# Patient Record
Sex: MALE | Race: WHITE | NOT HISPANIC OR LATINO | Employment: OTHER | URBAN - METROPOLITAN AREA
[De-identification: names, ages, dates, MRNs, and addresses within clinical notes are randomized per-mention and may not be internally consistent; named-entity substitution may affect disease eponyms.]

---

## 2020-01-17 ENCOUNTER — TELEPHONE (OUTPATIENT)
Dept: NEUROLOGY | Facility: CLINIC | Age: 70
End: 2020-01-17

## 2020-01-17 NOTE — TELEPHONE ENCOUNTER
----- Message from Rosalia Nguyen sent at 1/17/2020  2:02 PM CST -----  Contact: Artemio Warren calling to speak to Dr. Fagan he says he is a part of a medical study and has some questions to ask the doctor.  He can be reached at [phone]

## 2020-01-22 NOTE — TELEPHONE ENCOUNTER
Mr. Ulloa was calling in regards to his wife, left message on his voicemail requesting a call back. Will also document under her chart.     MD Veronica Martin MA   Caller: Unspecified (5 days ago,  4:54 PM)             Veronica,   I don't see that I have ever seen him.  Do you see anything?  If not, can you call him to find out?   Thanks,   RZ

## 2021-05-31 PROBLEM — M48.061 SPINAL STENOSIS, LUMBAR REGION WITHOUT NEUROGENIC CLAUDICATION: Status: ACTIVE | Noted: 2021-05-31

## 2021-08-13 PROBLEM — Z86.19 HISTORY OF HEPATITIS: Status: ACTIVE | Noted: 2021-08-13

## 2021-08-13 PROBLEM — E53.8 VITAMIN B12 DEFICIENCY: Status: ACTIVE | Noted: 2021-08-13

## 2021-08-13 PROBLEM — E55.9 VITAMIN D DEFICIENCY: Status: ACTIVE | Noted: 2021-08-13

## 2022-05-30 NOTE — PROGRESS NOTES
Subjective:      Patient ID: Artemio Ogden is a 72 y.o. male.    Chief Complaint: No chief complaint on file.    HPI:  Artemio Ogden is a 72 y.o. male who presents as an initial consult for severe mitral regurgitation. He has a medical history significant for afib, mitral regurgitation, GERD, MARCOS, and tyroid disease.   He reports that he has known about his mitral valve for many year but recently found out that he has advanced from moderate to severe regurgitation. He reports a history of atrial fibrillation.  His most recent echo reveals an ejection fraction is 35%.  He reports orthopnea, dyspnea on exertion, fatigue, and palpations.     Family and social history reviewed    Review of patient's allergies indicates:  No Known Allergies  Past Medical History:   Diagnosis Date    A-fib     Digestive disorder     reflux    Sleep apnea     No CPAP    Thyroid disease      Past Surgical History:   Procedure Laterality Date    CARDIAC SURGERY      AICD    EYE SURGERY Left 04/05/2012    Cataract     EYE SURGERY Left 02/14/2013    YAG Laser    EYE SURGERY Bilateral 01/14/2011    Lasik     HERNIA REPAIR  01/01/1967    PROSTATE SURGERY  01/01/1999    THYROID LOBECTOMY  09/11/2006     Family History     Family history is unknown by patient.        Social History     Socioeconomic History    Marital status:    Tobacco Use    Smoking status: Former Smoker    Smokeless tobacco: Never Used   Substance and Sexual Activity    Alcohol use: No    Drug use: No    Sexual activity: Yes     Partners: Female       Current Outpatient Medications:     apixaban (ELIQUIS) 5 mg Tab, Take 5 mg by mouth 2 (two) times daily., Disp: , Rfl:     ARGININE HCL, L-ARGININE, ORAL, Take 750 mg by mouth once daily., Disp: , Rfl:     ascorbic acid, vitamin C, (VITAMIN C) 1000 MG tablet, Take 1,500 mg by mouth 2 (two) times daily., Disp: , Rfl:     aspirin 325 MG tablet, Take 325 mg by mouth once daily., Disp: , Rfl:      "atenoloL (TENORMIN) 50 MG tablet, Take 50 mg by mouth once daily., Disp: , Rfl:     azelastine (ASTELIN) 137 mcg (0.1 %) nasal spray, 1 spray by Nasal route 2 (two) times daily., Disp: , Rfl:     BD LUER-KHADRA SYRINGE 3 mL 21 x 1 1/2" Syrg, , Disp: , Rfl: 2    cetirizine (ZYRTEC) 10 MG tablet, Take 10 mg by mouth once daily., Disp: , Rfl:     cholecalciferol, vitamin D3, (VITAMIN D3) 50 mcg (2,000 unit) Tab, Take 5,000 Units by mouth once daily., Disp: , Rfl:     co-enzyme Q-10 30 mg capsule, Take 400 mg by mouth 2 (two) times daily., Disp: , Rfl:     diphenhydrAMINE (BENADRYL) 25 mg capsule, Take 5 mg by mouth every 6 (six) hours as needed for Itching., Disp: , Rfl:     fexofenadine (ALLEGRA) 30 MG tablet, Take 30 mg by mouth 2 (two) times daily., Disp: , Rfl:     furosemide (LASIX) 20 MG tablet, Take 20 mg by mouth 3 (three) times daily., Disp: , Rfl:     levothyroxine (SYNTHROID) 100 MCG tablet, Take 100 mcg by mouth before breakfast., Disp: , Rfl:     liothyronine (CYTOMEL) 5 MCG Tab, Take 5 mcg by mouth once daily., Disp: , Rfl: 2    magnesium glycinate-mag oxide 120 mg magnesium Cap, Take 120 mg by mouth once daily at 6am., Disp: , Rfl:     mometasone (NASONEX) 50 mcg/actuation nasal spray, 2 sprays by Nasal route once daily., Disp: , Rfl:     montelukast (SINGULAIR) 10 mg tablet, Take 10 mg by mouth once daily., Disp: , Rfl: 3    potassium chloride (KLOR-CON) 10 MEQ TbSR, Take 10 mEq by mouth 2 (two) times a day., Disp: , Rfl:     QUERCETIN DIHYDRATE, BULK, MISC, 500 mg by Misc.(Non-Drug; Combo Route) route., Disp: , Rfl:     tadalafil (CIALIS) 20 MG Tab, Take 20 mg by mouth once daily., Disp: , Rfl:     testosterone cypionate (DEPOTESTOTERONE CYPIONATE) 200 mg/mL injection, Inject into the muscle every 7 days., Disp: , Rfl: 3    vitamin A 91534 UNIT capsule, Take 5,000 Units by mouth once daily., Disp: , Rfl:     zinc sulfate (ZINCATE) 50 mg zinc (220 mg) capsule, Take 220 mg by mouth once " daily., Disp: , Rfl:   Current medications Reviewed    Review of Systems   Constitutional: Positive for fatigue. Negative for activity change, appetite change and fever.   HENT: Negative for nosebleeds.    Respiratory: Positive for shortness of breath. Negative for cough.    Cardiovascular: Positive for palpitations. Negative for chest pain and leg swelling.        Orthopnea   Gastrointestinal: Negative for abdominal distention, abdominal pain and nausea.   Genitourinary: Negative for frequency.   Musculoskeletal: Negative for arthralgias and myalgias.   Skin: Negative for rash.   Neurological: Negative for dizziness and numbness.   Hematological: Does not bruise/bleed easily.     Objective:   Physical Exam  Constitutional:       Appearance: He is obese.   HENT:      Head: Normocephalic and atraumatic.   Eyes:      Extraocular Movements: Extraocular movements intact.   Cardiovascular:      Rate and Rhythm: Normal rate and regular rhythm.   Pulmonary:      Effort: Pulmonary effort is normal.   Abdominal:      General: Abdomen is flat.      Palpations: Abdomen is soft.   Musculoskeletal:         General: Normal range of motion.      Cervical back: Normal range of motion.   Skin:     General: Skin is warm and dry.      Capillary Refill: Capillary refill takes less than 2 seconds.   Neurological:      General: No focal deficit present.       Diagnostic Results: Reviewed  RUCHI:  · Moderately decreased systolic function.  · The estimated ejection fraction is 35%.  · Left ventricular diastolic dysfunction.  · Normal right ventricular size with normal right ventricular systolic function.  · Severe left atrial enlargement.  · Mild right atrial enlargement.  · Mild aortic regurgitation.  · Severe mitral regurgitation.  · A 200 J synchronized cardioversion was successfully performed with restoration of normal sinus rhythm.  Assessment:   1. Severe Mitral Valve Regurgitation  Plan:     CTS Attending Note:    I have personally  taken the history and examined this patient and agree with the JJ's note as stated above.  Very pleasant 72-year-old gentleman with severe mitral regurgitation and atrial fibrillation.  He is symptomatic for this, with dyspnea on exertion.  His wife has had a stroke and he is her primary caregiver.  I recommended mitral valve repair and Maze procedure.  He has an angiogram scheduled for this Thursday.  We did not discuss risks and benefits on this visit, or valve choice should repair not be feasible.  We will discuss that at his preoperative visit.

## 2022-05-31 ENCOUNTER — OFFICE VISIT (OUTPATIENT)
Dept: CARDIOTHORACIC SURGERY | Facility: CLINIC | Age: 72
End: 2022-05-31
Payer: MEDICARE

## 2022-05-31 VITALS
RESPIRATION RATE: 18 BRPM | HEIGHT: 73 IN | DIASTOLIC BLOOD PRESSURE: 80 MMHG | OXYGEN SATURATION: 96 % | BODY MASS INDEX: 31.95 KG/M2 | WEIGHT: 241.06 LBS | HEART RATE: 74 BPM | SYSTOLIC BLOOD PRESSURE: 134 MMHG

## 2022-05-31 DIAGNOSIS — I48.0 PAROXYSMAL ATRIAL FIBRILLATION: ICD-10-CM

## 2022-05-31 DIAGNOSIS — I34.0 MITRAL VALVE INSUFFICIENCY, UNSPECIFIED ETIOLOGY: ICD-10-CM

## 2022-05-31 DIAGNOSIS — I34.0 MITRAL VALVE INSUFFICIENCY, UNSPECIFIED ETIOLOGY: Primary | ICD-10-CM

## 2022-05-31 PROCEDURE — 99999 PR PBB SHADOW E&M-EST. PATIENT-LVL V: CPT | Mod: PBBFAC,,, | Performed by: THORACIC SURGERY (CARDIOTHORACIC VASCULAR SURGERY)

## 2022-05-31 PROCEDURE — 99205 OFFICE O/P NEW HI 60 MIN: CPT | Mod: S$PBB,,, | Performed by: THORACIC SURGERY (CARDIOTHORACIC VASCULAR SURGERY)

## 2022-05-31 PROCEDURE — 99205 PR OFFICE/OUTPT VISIT, NEW, LEVL V, 60-74 MIN: ICD-10-PCS | Mod: S$PBB,,, | Performed by: THORACIC SURGERY (CARDIOTHORACIC VASCULAR SURGERY)

## 2022-05-31 PROCEDURE — 99215 OFFICE O/P EST HI 40 MIN: CPT | Mod: PBBFAC | Performed by: THORACIC SURGERY (CARDIOTHORACIC VASCULAR SURGERY)

## 2022-05-31 PROCEDURE — 99999 PR PBB SHADOW E&M-EST. PATIENT-LVL V: ICD-10-PCS | Mod: PBBFAC,,, | Performed by: THORACIC SURGERY (CARDIOTHORACIC VASCULAR SURGERY)

## 2022-05-31 RX ORDER — BUDESONIDE AND FORMOTEROL FUMARATE DIHYDRATE 160; 4.5 UG/1; UG/1
AEROSOL RESPIRATORY (INHALATION)
Status: ON HOLD | COMMUNITY
End: 2022-06-02 | Stop reason: ALTCHOICE

## 2022-05-31 RX ORDER — MAGNESIUM 200 MG
120 TABLET ORAL 2 TIMES DAILY
Status: ON HOLD | COMMUNITY
End: 2022-06-02 | Stop reason: SDUPTHER

## 2022-05-31 RX ORDER — METOPROLOL TARTRATE 100 MG/1
TABLET ORAL
Status: ON HOLD | COMMUNITY
End: 2022-06-02

## 2022-05-31 RX ORDER — METOPROLOL TARTRATE 50 MG/1
TABLET ORAL
Status: ON HOLD | COMMUNITY
End: 2022-06-02

## 2022-05-31 RX ORDER — NIRMATRELVIR AND RITONAVIR 300-100 MG
KIT ORAL
Status: ON HOLD | COMMUNITY
End: 2022-06-02 | Stop reason: ALTCHOICE

## 2022-05-31 RX ORDER — ALBUTEROL SULFATE 90 UG/1
AEROSOL, METERED RESPIRATORY (INHALATION)
COMMUNITY
End: 2022-10-21

## 2022-05-31 RX ORDER — HYDROCODONE BITARTRATE AND ACETAMINOPHEN 10; 325 MG/1; MG/1
1 TABLET ORAL EVERY 6 HOURS PRN
Status: ON HOLD | COMMUNITY
Start: 2022-01-20 | End: 2022-06-02 | Stop reason: ALTCHOICE

## 2022-05-31 RX ORDER — TAMSULOSIN HYDROCHLORIDE 0.4 MG/1
CAPSULE ORAL
Status: ON HOLD | COMMUNITY
End: 2022-06-02 | Stop reason: ALTCHOICE

## 2022-05-31 RX ORDER — ARGININE OXOGLURATE 350 MG
TABLET, EXTENDED RELEASE ORAL
Status: ON HOLD | COMMUNITY
End: 2022-06-30 | Stop reason: SDUPTHER

## 2022-05-31 RX ORDER — BENZONATATE 200 MG/1
200 CAPSULE ORAL 2 TIMES DAILY PRN
Status: ON HOLD | COMMUNITY
Start: 2022-05-21 | End: 2022-06-02 | Stop reason: ALTCHOICE

## 2022-05-31 RX ORDER — LISINOPRIL 5 MG/1
TABLET ORAL
Status: ON HOLD | COMMUNITY
End: 2022-06-02 | Stop reason: ALTCHOICE

## 2022-05-31 RX ORDER — PROMETHAZINE HYDROCHLORIDE AND DEXTROMETHORPHAN HYDROBROMIDE 6.25; 15 MG/5ML; MG/5ML
SYRUP ORAL
Status: ON HOLD | COMMUNITY
End: 2022-06-02 | Stop reason: ALTCHOICE

## 2022-05-31 RX ORDER — GUAIFENESIN 400 MG/1
TABLET ORAL
COMMUNITY
End: 2022-09-28

## 2022-05-31 RX ORDER — MINERAL OIL
ENEMA (ML) RECTAL
COMMUNITY
End: 2022-10-21

## 2022-05-31 RX ORDER — NIRMATRELVIR AND RITONAVIR 300-100 MG
3 KIT ORAL 2 TIMES DAILY
Status: ON HOLD | COMMUNITY
Start: 2022-05-21 | End: 2022-06-02 | Stop reason: ALTCHOICE

## 2022-05-31 RX ORDER — TESTOSTERONE 25 MG/2.5G
GEL TRANSDERMAL
Status: ON HOLD | COMMUNITY
End: 2022-06-02 | Stop reason: ALTCHOICE

## 2022-05-31 RX ORDER — CALCIUM CARB/VITAMIN D3/VIT K1 500-500-40
TABLET,CHEWABLE ORAL
Status: ON HOLD | COMMUNITY
End: 2022-06-30 | Stop reason: SDUPTHER

## 2022-05-31 RX ORDER — AZITHROMYCIN 250 MG/1
TABLET, FILM COATED ORAL
Status: ON HOLD | COMMUNITY
Start: 2022-05-11 | End: 2022-06-02 | Stop reason: ALTCHOICE

## 2022-05-31 RX ORDER — ZINC SULFATE 66 MG
TABLET ORAL
Status: ON HOLD | COMMUNITY
End: 2022-06-02 | Stop reason: ALTCHOICE

## 2022-05-31 RX ORDER — LOSARTAN POTASSIUM 25 MG/1
TABLET ORAL
Status: ON HOLD | COMMUNITY
End: 2022-06-02 | Stop reason: CLARIF

## 2022-05-31 NOTE — LETTER
[facility] 2nd Fl  [address]  [facility] [address]  Phone: [phone] May 31, 2022          Nikhil Lechuga III, MD  [address] [facility] [address]  [facility] [address]    Patient: Artemio Ogden   MR Number: [id number]   YOB: 1950   Date of Visit: 5/31/2022     Dear Dr. Lechuga:     I had the pleasure of seeing your patient, Mr. Artemio Ogden, in clinic today.  As you know, he is a very pleasant 72-year-old gentleman with a long history of atrial fibrillation and mitral regurgitation.  He is symptomatic for this.  We discussed surgical mitral valve repair with Maze procedure as well as mitral clip.  He agreed that surgically addressing the valve and treating the atrial fibrillation was the best course of action.  We will plan to get this done for him later this month.  I understand he has an angiogram scheduled with you this Thursday.  We will proceed with surgical revascularization as well if that is indicated by the results of the angiogram.      Thank you for sending this pleasant gentleman to me.  It was a pleasure to meet him.  When he does come to surgery, I will certainly keep you appraised of his progress.    Sincerely,          MD Gerald Perez

## 2022-06-03 ENCOUNTER — TELEPHONE (OUTPATIENT)
Dept: CARDIOTHORACIC SURGERY | Facility: CLINIC | Age: 72
End: 2022-06-03
Payer: MEDICARE

## 2022-06-03 NOTE — TELEPHONE ENCOUNTER
Called pt to review pre-op testing appointments which have been scheduled for Tuesday, June 28. Pt needs pre-op echo per Dr. Machado's request, but no availability on that day. Pt states he prefers to the echo completed with his cardiologist, Dr. Lechuga. Notified Dr. Lechuga's office who will complete echo and fax results to us.     Pt confirms [employer] Defibrillator still in place. Notified Arrhythmia Department at [facility].     Pt requests all pre-op information sent to him via e-mail. Confirmed e-mail address. Sent pre-op testing appointment schedule and list of medications to hold prior to surgery.

## 2022-06-13 ENCOUNTER — TELEPHONE (OUTPATIENT)
Dept: CARDIOTHORACIC SURGERY | Facility: CLINIC | Age: 72
End: 2022-06-13
Payer: MEDICARE

## 2022-06-13 NOTE — TELEPHONE ENCOUNTER
Received call from pt asking for assistance with My Chart activation. Re-sent activation code to pt's email. Pt asking to review medications to hold and appointments for pre-op day. Reviewed and pt verbalized understanding of all information.

## 2022-06-20 ENCOUNTER — TELEPHONE (OUTPATIENT)
Dept: CARDIOTHORACIC SURGERY | Facility: CLINIC | Age: 72
End: 2022-06-20
Payer: MEDICARE

## 2022-06-20 NOTE — TELEPHONE ENCOUNTER
Returned missed call to pt. Pt wanting to review medications again. Also asking if Dr. Machado is OK with him taking prophylactic ivermectin prior to hospital admission. Will discuss with Dr. Machado and return call to pt. Pt verbalized understanding.

## 2022-06-28 ENCOUNTER — HOSPITAL ENCOUNTER (OUTPATIENT)
Dept: PULMONOLOGY | Facility: CLINIC | Age: 72
Discharge: HOME | DRG: 220 | End: 2022-06-28
Payer: MEDICARE

## 2022-06-28 ENCOUNTER — HOSPITAL ENCOUNTER (OUTPATIENT)
Dept: VASCULAR SURGERY | Facility: CLINIC | Age: 72
Discharge: HOME | DRG: 220 | End: 2022-06-28
Attending: THORACIC SURGERY (CARDIOTHORACIC VASCULAR SURGERY)
Payer: MEDICARE

## 2022-06-28 ENCOUNTER — OFFICE VISIT (OUTPATIENT)
Dept: CARDIOTHORACIC SURGERY | Facility: CLINIC | Age: 72
DRG: 220 | End: 2022-06-28
Payer: MEDICARE

## 2022-06-28 ENCOUNTER — HOSPITAL ENCOUNTER (OUTPATIENT)
Dept: CARDIOLOGY | Facility: CLINIC | Age: 72
Discharge: HOME | DRG: 220 | End: 2022-06-28
Payer: MEDICARE

## 2022-06-28 ENCOUNTER — HOSPITAL ENCOUNTER (OUTPATIENT)
Dept: RADIOLOGY | Facility: HOSPITAL | Age: 72
Discharge: HOME | DRG: 220 | End: 2022-06-28
Attending: THORACIC SURGERY (CARDIOTHORACIC VASCULAR SURGERY)
Payer: MEDICARE

## 2022-06-28 VITALS
DIASTOLIC BLOOD PRESSURE: 62 MMHG | HEIGHT: 73 IN | SYSTOLIC BLOOD PRESSURE: 139 MMHG | BODY MASS INDEX: 31.7 KG/M2 | WEIGHT: 239.19 LBS | OXYGEN SATURATION: 96 % | HEART RATE: 62 BPM | RESPIRATION RATE: 18 BRPM

## 2022-06-28 DIAGNOSIS — I34.0 MITRAL VALVE INSUFFICIENCY, UNSPECIFIED ETIOLOGY: Primary | ICD-10-CM

## 2022-06-28 DIAGNOSIS — I48.0 PAROXYSMAL ATRIAL FIBRILLATION: ICD-10-CM

## 2022-06-28 DIAGNOSIS — I34.0 MITRAL VALVE INSUFFICIENCY, UNSPECIFIED ETIOLOGY: ICD-10-CM

## 2022-06-28 DIAGNOSIS — Z13.9 SCREENING PROCEDURE: Primary | ICD-10-CM

## 2022-06-28 DIAGNOSIS — Z01.818 PRE-OP EXAM: ICD-10-CM

## 2022-06-28 DIAGNOSIS — I65.23 BILATERAL CAROTID ARTERY STENOSIS: ICD-10-CM

## 2022-06-28 LAB
CTP QC/QA: YES
DLCO SINGLE BREATH LLN: 22
DLCO SINGLE BREATH PRE REF: 58.2 %
DLCO SINGLE BREATH REF: 28.92
DLCOC SBVA LLN: 2.68
DLCOC SBVA REF: 3.74
DLCOC SINGLE BREATH LLN: 22
DLCOC SINGLE BREATH REF: 28.92
DLCOCSBVAULN: 4.8
DLCOCSINGLEBREATHULN: 35.85
DLCOSINGLEBREATHULN: 35.85
DLCOVA LLN: 2.68
DLCOVA PRE REF: 84.5 %
DLCOVA PRE: 3.16 ML/(MIN*MMHG*L) (ref 2.68–4.8)
DLCOVA REF: 3.74
DLCOVAULN: 4.8
FEF 25 75 LLN: 1
FEF 25 75 PRE REF: 55.1 %
FEF 25 75 REF: 2.42
FEV05 LLN: 1.72
FEV05 REF: 2.86
FEV1 FVC LLN: 61
FEV1 FVC PRE REF: 89.3 %
FEV1 FVC REF: 75
FEV1 LLN: 2.39
FEV1 PRE REF: 67.8 %
FEV1 REF: 3.37
FVC LLN: 3.32
FVC PRE REF: 75.4 %
FVC REF: 4.53
IVC PRE: 3.51 L (ref 3.32–5.76)
IVC SINGLE BREATH LLN: 3.32
IVC SINGLE BREATH PRE REF: 77.4 %
IVC SINGLE BREATH REF: 4.53
IVCSINGLEBREATHULN: 5.76
MVV LLN: 113
MVV PRE REF: 69 %
MVV REF: 133
PEF LLN: 6.11
PEF PRE REF: 79.3 %
PEF REF: 8.63
PHYSICIAN COMMENT: ABNORMAL
PRE DLCO: 16.83 ML/(MIN*MMHG) (ref 22–35.85)
PRE FEF 25 75: 1.33 L/S (ref 1–4.47)
PRE FET 100: 6.43 SEC
PRE FEV05 REF: 60.4 %
PRE FEV1 FVC: 66.85 % (ref 61.03–87.28)
PRE FEV1: 2.28 L (ref 2.39–4.28)
PRE FEV5: 1.73 L (ref 1.72–3.99)
PRE FVC: 3.42 L (ref 3.32–5.76)
PRE MVV: 92.05 L/MIN (ref 113.46–153.5)
PRE PEF: 6.85 L/S (ref 6.11–11.15)
SARS-COV-2 AG RESP QL IA.RAPID: NEGATIVE
VA PRE: 5.32 L (ref 7.59–7.59)
VA SINGLE BREATH LLN: 7.59
VA SINGLE BREATH PRE REF: 70.2 %
VA SINGLE BREATH REF: 7.59
VASINGLEBREATHULN: 7.59

## 2022-06-28 PROCEDURE — 94010 BREATHING CAPACITY TEST: CPT | Mod: 26,S$PBB,, | Performed by: INTERNAL MEDICINE

## 2022-06-28 PROCEDURE — 93005 ELECTROCARDIOGRAM TRACING: CPT | Mod: PBBFAC | Performed by: INTERNAL MEDICINE

## 2022-06-28 PROCEDURE — 99999 PR PBB SHADOW E&M-EST. PATIENT-LVL III: CPT | Mod: PBBFAC,,, | Performed by: THORACIC SURGERY (CARDIOTHORACIC VASCULAR SURGERY)

## 2022-06-28 PROCEDURE — 93010 ELECTROCARDIOGRAM REPORT: CPT | Mod: S$PBB,,, | Performed by: INTERNAL MEDICINE

## 2022-06-28 PROCEDURE — 86920 COMPATIBILITY TEST SPIN: CPT | Performed by: STUDENT IN AN ORGANIZED HEALTH CARE EDUCATION/TRAINING PROGRAM

## 2022-06-28 PROCEDURE — 86920 COMPATIBILITY TEST SPIN: CPT | Performed by: THORACIC SURGERY (CARDIOTHORACIC VASCULAR SURGERY)

## 2022-06-28 PROCEDURE — 93880 EXTRACRANIAL BILAT STUDY: CPT | Mod: PBBFAC | Performed by: SURGERY

## 2022-06-28 PROCEDURE — 99213 OFFICE O/P EST LOW 20 MIN: CPT | Mod: PBBFAC,25 | Performed by: THORACIC SURGERY (CARDIOTHORACIC VASCULAR SURGERY)

## 2022-06-28 PROCEDURE — 94729 PR C02/MEMBANE DIFFUSE CAPACITY: ICD-10-PCS | Mod: 26,S$PBB,, | Performed by: INTERNAL MEDICINE

## 2022-06-28 PROCEDURE — 71046 X-RAY EXAM CHEST 2 VIEWS: CPT | Mod: 26,,, | Performed by: RADIOLOGY

## 2022-06-28 PROCEDURE — 87811 SARS-COV-2 COVID19 W/OPTIC: CPT | Mod: PBBFAC

## 2022-06-28 PROCEDURE — 94729 DIFFUSING CAPACITY: CPT | Mod: 26,S$PBB,, | Performed by: INTERNAL MEDICINE

## 2022-06-28 PROCEDURE — 94729 DIFFUSING CAPACITY: CPT | Mod: PBBFAC | Performed by: INTERNAL MEDICINE

## 2022-06-28 PROCEDURE — 94010 BREATHING CAPACITY TEST: CPT | Mod: PBBFAC | Performed by: INTERNAL MEDICINE

## 2022-06-28 PROCEDURE — 99499 UNLISTED E&M SERVICE: CPT | Mod: S$PBB,,, | Performed by: THORACIC SURGERY (CARDIOTHORACIC VASCULAR SURGERY)

## 2022-06-28 PROCEDURE — 99999 PR PBB SHADOW E&M-EST. PATIENT-LVL III: ICD-10-PCS | Mod: PBBFAC,,, | Performed by: THORACIC SURGERY (CARDIOTHORACIC VASCULAR SURGERY)

## 2022-06-28 PROCEDURE — 93010 EKG 12-LEAD: ICD-10-PCS | Mod: S$PBB,,, | Performed by: INTERNAL MEDICINE

## 2022-06-28 PROCEDURE — 93880 PR DUPLEX SCAN EXTRACRANIAL,BILAT: ICD-10-PCS | Mod: 26,S$PBB,, | Performed by: SURGERY

## 2022-06-28 PROCEDURE — 71046 XR CHEST PA AND LATERAL PRE-OP: ICD-10-PCS | Mod: 26,,, | Performed by: RADIOLOGY

## 2022-06-28 PROCEDURE — 99499 NO LOS: ICD-10-PCS | Mod: S$PBB,,, | Performed by: THORACIC SURGERY (CARDIOTHORACIC VASCULAR SURGERY)

## 2022-06-28 PROCEDURE — 71046 X-RAY EXAM CHEST 2 VIEWS: CPT | Mod: TC,FY

## 2022-06-28 PROCEDURE — 93880 EXTRACRANIAL BILAT STUDY: CPT | Mod: 26,S$PBB,, | Performed by: SURGERY

## 2022-06-28 PROCEDURE — 94010 BREATHING CAPACITY TEST: ICD-10-PCS | Mod: 26,S$PBB,, | Performed by: INTERNAL MEDICINE

## 2022-06-28 RX ORDER — CEFAZOLIN SODIUM/D5W 2 G/100 ML
2 PLASTIC BAG, INJECTION (ML) INTRAVENOUS
Status: CANCELLED | OUTPATIENT
Start: 2022-06-28

## 2022-06-28 RX ORDER — ASPIRIN 300 MG/1
300 SUPPOSITORY RECTAL ONCE AS NEEDED
Status: CANCELLED | OUTPATIENT
Start: 2022-06-28 | End: 2033-11-24

## 2022-06-28 RX ORDER — ONDANSETRON 2 MG/ML
4 INJECTION INTRAMUSCULAR; INTRAVENOUS EVERY 12 HOURS PRN
Status: CANCELLED | OUTPATIENT
Start: 2022-06-28

## 2022-06-28 RX ORDER — FAMOTIDINE 20 MG/1
20 TABLET, FILM COATED ORAL 2 TIMES DAILY
Status: CANCELLED | OUTPATIENT
Start: 2022-06-28

## 2022-06-28 RX ORDER — ACETAMINOPHEN 325 MG/1
650 TABLET ORAL EVERY 4 HOURS PRN
Status: CANCELLED | OUTPATIENT
Start: 2022-06-28

## 2022-06-28 RX ORDER — OXYCODONE HYDROCHLORIDE 5 MG/1
5 TABLET ORAL EVERY 4 HOURS PRN
Status: CANCELLED | OUTPATIENT
Start: 2022-06-28

## 2022-06-28 RX ORDER — MAGNESIUM SULFATE HEPTAHYDRATE 40 MG/ML
2 INJECTION, SOLUTION INTRAVENOUS
Status: CANCELLED | OUTPATIENT
Start: 2022-06-28

## 2022-06-28 RX ORDER — ATORVASTATIN CALCIUM 40 MG/1
40 TABLET, FILM COATED ORAL NIGHTLY
Status: CANCELLED | OUTPATIENT
Start: 2022-06-28

## 2022-06-28 RX ORDER — POTASSIUM CHLORIDE 14.9 MG/ML
60 INJECTION INTRAVENOUS
Status: CANCELLED | OUTPATIENT
Start: 2022-06-28

## 2022-06-28 RX ORDER — DOCUSATE SODIUM 100 MG/1
100 CAPSULE, LIQUID FILLED ORAL 2 TIMES DAILY
Status: CANCELLED | OUTPATIENT
Start: 2022-06-28

## 2022-06-28 RX ORDER — BISACODYL 10 MG
10 SUPPOSITORY, RECTAL RECTAL DAILY PRN
Status: CANCELLED | OUTPATIENT
Start: 2022-06-28

## 2022-06-28 RX ORDER — OXYCODONE HYDROCHLORIDE 10 MG/1
10 TABLET ORAL EVERY 4 HOURS PRN
Status: CANCELLED | OUTPATIENT
Start: 2022-06-28

## 2022-06-28 RX ORDER — POTASSIUM CHLORIDE 20 MEQ/1
20 TABLET, EXTENDED RELEASE ORAL EVERY 12 HOURS
Status: CANCELLED | OUTPATIENT
Start: 2022-06-28

## 2022-06-28 RX ORDER — SODIUM CHLORIDE 9 MG/ML
INJECTION, SOLUTION INTRAVENOUS CONTINUOUS
Status: CANCELLED | OUTPATIENT
Start: 2022-06-28

## 2022-06-28 RX ORDER — ASPIRIN 325 MG
325 TABLET ORAL DAILY
Status: CANCELLED | OUTPATIENT
Start: 2022-06-28

## 2022-06-28 RX ORDER — IPRATROPIUM BROMIDE AND ALBUTEROL SULFATE 2.5; .5 MG/3ML; MG/3ML
3 SOLUTION RESPIRATORY (INHALATION) EVERY 4 HOURS
Status: CANCELLED | OUTPATIENT
Start: 2022-06-28 | End: 2022-06-29

## 2022-06-28 RX ORDER — ASPIRIN 325 MG
325 TABLET, DELAYED RELEASE (ENTERIC COATED) ORAL DAILY
Status: CANCELLED | OUTPATIENT
Start: 2022-06-28

## 2022-06-28 RX ORDER — FAMOTIDINE 10 MG/ML
20 INJECTION INTRAVENOUS 2 TIMES DAILY
Status: CANCELLED | OUTPATIENT
Start: 2022-06-28

## 2022-06-28 RX ORDER — METOPROLOL TARTRATE 25 MG/1
25 TABLET, FILM COATED ORAL
Status: CANCELLED | OUTPATIENT
Start: 2022-06-28

## 2022-06-28 RX ORDER — PROPOFOL 10 MG/ML
0-50 INJECTION, EMULSION INTRAVENOUS CONTINUOUS
Status: CANCELLED | OUTPATIENT
Start: 2022-06-28

## 2022-06-28 RX ORDER — IPRATROPIUM BROMIDE AND ALBUTEROL SULFATE 2.5; .5 MG/3ML; MG/3ML
3 SOLUTION RESPIRATORY (INHALATION) EVERY 4 HOURS PRN
Status: CANCELLED | OUTPATIENT
Start: 2022-06-28 | End: 2022-06-29

## 2022-06-28 RX ORDER — MUPIROCIN 20 MG/G
1 OINTMENT TOPICAL
Status: CANCELLED | OUTPATIENT
Start: 2022-06-28

## 2022-06-28 RX ORDER — POTASSIUM CHLORIDE 14.9 MG/ML
20 INJECTION INTRAVENOUS
Status: CANCELLED | OUTPATIENT
Start: 2022-06-28

## 2022-06-28 RX ORDER — POTASSIUM CHLORIDE 29.8 MG/ML
40 INJECTION INTRAVENOUS
Status: CANCELLED | OUTPATIENT
Start: 2022-06-28

## 2022-06-28 RX ORDER — ASPIRIN 325 MG
325 TABLET ORAL ONCE
Status: CANCELLED | OUTPATIENT
Start: 2022-06-28 | End: 2022-06-28

## 2022-06-28 RX ORDER — ALBUMIN HUMAN 50 G/1000ML
25 SOLUTION INTRAVENOUS ONCE AS NEEDED
Status: CANCELLED | OUTPATIENT
Start: 2022-06-28 | End: 2033-11-24

## 2022-06-28 RX ORDER — FENTANYL CITRATE 50 UG/ML
25 INJECTION, SOLUTION INTRAMUSCULAR; INTRAVENOUS
Status: CANCELLED | OUTPATIENT
Start: 2022-06-28

## 2022-06-28 RX ORDER — FENTANYL CITRATE 50 UG/ML
25 INJECTION, SOLUTION INTRAMUSCULAR; INTRAVENOUS
Status: CANCELLED | OUTPATIENT
Start: 2022-06-28 | End: 2022-06-30

## 2022-06-28 RX ORDER — METOCLOPRAMIDE HYDROCHLORIDE 5 MG/ML
5 INJECTION INTRAMUSCULAR; INTRAVENOUS EVERY 6 HOURS PRN
Status: CANCELLED | OUTPATIENT
Start: 2022-06-28

## 2022-06-28 RX ORDER — MAGNESIUM SULFATE HEPTAHYDRATE 40 MG/ML
4 INJECTION, SOLUTION INTRAVENOUS
Status: CANCELLED | OUTPATIENT
Start: 2022-06-28

## 2022-06-28 RX ORDER — DEXTROSE MONOHYDRATE, SODIUM CHLORIDE, AND POTASSIUM CHLORIDE 50; 1.49; 4.5 G/1000ML; G/1000ML; G/1000ML
INJECTION, SOLUTION INTRAVENOUS CONTINUOUS
Status: CANCELLED | OUTPATIENT
Start: 2022-06-28

## 2022-06-28 RX ORDER — MUPIROCIN 20 MG/G
1 OINTMENT TOPICAL 2 TIMES DAILY
Status: CANCELLED | OUTPATIENT
Start: 2022-06-28 | End: 2022-07-03

## 2022-06-28 RX ORDER — FENTANYL CITRATE 50 UG/ML
50 INJECTION, SOLUTION INTRAMUSCULAR; INTRAVENOUS
Status: CANCELLED | OUTPATIENT
Start: 2022-07-01

## 2022-06-28 RX ORDER — SODIUM CHLORIDE 0.9 % (FLUSH) 0.9 %
10 SYRINGE (ML) INJECTION
Status: CANCELLED | OUTPATIENT
Start: 2022-06-28

## 2022-06-28 RX ORDER — POLYETHYLENE GLYCOL 3350 17 G/17G
17 POWDER, FOR SOLUTION ORAL DAILY
Status: CANCELLED | OUTPATIENT
Start: 2022-06-28

## 2022-06-28 RX ORDER — LIDOCAINE HYDROCHLORIDE 10 MG/ML
1 INJECTION, SOLUTION EPIDURAL; INFILTRATION; INTRACAUDAL; PERINEURAL
Status: CANCELLED | OUTPATIENT
Start: 2022-06-28

## 2022-06-28 RX ORDER — CEFAZOLIN SODIUM/D5W 2 G/100 ML
2 PLASTIC BAG, INJECTION (ML) INTRAVENOUS
Status: CANCELLED | OUTPATIENT
Start: 2022-06-28 | End: 2022-06-30

## 2022-06-28 NOTE — PROGRESS NOTES
Subjective:      Patient ID: Artemio Ogden is a 72 y.o. male.     Chief Complaint: No chief complaint on file.     HPI:  Artemio Ogden is a 72 y.o. male who presents for pre-op MVR. He has a medical history significant for afib, mitral regurgitation, GERD, MARCOS, and tyroid disease.   He reports that he has known about his mitral valve for many year but recently found out that he has advanced from moderate to severe regurgitation. He reports a history of atrial fibrillation.  His most recent echo reveals an ejection fraction is 35%.  He reports orthopnea, dyspnea on exertion, fatigue, and palpations.      Family and social history reviewed     Review of patient's allergies indicates:  No Known Allergies       Past Medical History:   Diagnosis Date    A-fib      Digestive disorder       reflux    Sleep apnea       No CPAP    Thyroid disease              Past Surgical History:   Procedure Laterality Date    CARDIAC SURGERY         AICD    EYE SURGERY Left 04/05/2012     Cataract     EYE SURGERY Left 02/14/2013     YAG Laser    EYE SURGERY Bilateral 01/14/2011     Lasik     HERNIA REPAIR   01/01/1967    PROSTATE SURGERY   01/01/1999    THYROID LOBECTOMY   09/11/2006          Family History      Family history is unknown by patient.          Social History               Socioeconomic History    Marital status:    Tobacco Use    Smoking status: Former Smoker    Smokeless tobacco: Never Used   Substance and Sexual Activity    Alcohol use: No    Drug use: No    Sexual activity: Yes       Partners: Female            Current Outpatient Medications   Medication Instructions    albuterol (PROVENTIL/VENTOLIN HFA) 90 mcg/actuation inhaler Inhale 2 puffs every 4 hours by inhalation route as needed.    apixaban (ELIQUIS) 5 mg, Oral, 2 times daily    ARGININE HCL, L-ARGININE, ORAL 750 mg, Oral, Daily    arginine oxoglurate (L-ARGININE,ALPHA-KETOGLUTARAT,) 350 mg TbSR L-Arginine(alpha-ketoglutarat)  "   ascorbic acid (vitamin C) (VITAMIN C) 1,500 mg, Oral, 2 times daily    aspirin 325 mg, Oral, Daily    atenoloL (TENORMIN) 50 mg, Oral, Daily    azelastine (ASTELIN) 137 mcg (0.1 %) nasal spray 1 spray, Nasal, 2 times daily    BD LUER-KHADRA SYRINGE 3 mL 21 x 1 1/2" Syrg No dose, route, or frequency recorded.    cetirizine (ZYRTEC) 10 mg, Oral, Daily    cholecalciferol (vitamin D3) (VITAMIN D3) 5,000 Units, Oral, Daily    cholecalciferol, vitamin D3, 10 mcg (400 unit) Cap capsule Vitamin D3    co-enzyme Q-10 400 mg, Oral, 2 times daily    diphenhydrAMINE (BENADRYL) 5 mg, Oral, Every 6 hours PRN    fexofenadine (ALLEGRA) 180 MG tablet Take 1 tablet every day by oral route.    furosemide (LASIX) 20 mg, Oral, 3 times daily    guaiFENesin (HUMIBID E) 400 mg Tab guaifenesin   400 mg 2 in the pm as needed    levothyroxine (SYNTHROID) 100 mcg, Oral, Before breakfast    liothyronine (CYTOMEL) 5 mcg, Oral, Daily    magnesium glycinate-mag oxide 120 mg, Oral, 2 times daily    mometasone (NASONEX) 50 mcg/actuation nasal spray 2 sprays, Nasal, Daily    montelukast (SINGULAIR) 10 mg, Oral, Daily    potassium chloride (KLOR-CON) 10 MEQ TbSR 10 mEq, Oral, 2 times daily    QUERCETIN DIHYDRATE, BULK, MISC 500 mg, Misc.(Non-Drug; Combo Route)    tadalafiL (CIALIS) 5 mg, Oral, Daily, Patient states he takes one 5mg tablet nightly    testosterone cypionate (DEPOTESTOTERONE CYPIONATE) 100 mg, Intramuscular, Every 7 days    vitamin A 5,000 Units, Oral, Daily    zinc sulfate (ZINCATE) 220 mg, Oral, Daily         Current medications Reviewed     Review of Systems   Constitutional: Positive for fatigue. Negative for activity change, appetite change and fever.   HENT: Negative for nosebleeds.    Respiratory: Positive for shortness of breath. Negative for cough.    Cardiovascular: Positive for palpitations. Negative for chest pain and leg swelling.        Orthopnea   Gastrointestinal: Negative for abdominal distention, " abdominal pain and nausea.   Genitourinary: Negative for frequency.   Musculoskeletal: Negative for arthralgias and myalgias.   Skin: Negative for rash.   Neurological: Negative for dizziness and numbness.   Hematological: Does not bruise/bleed easily.      Objective:   Physical Exam  Constitutional:       Appearance: He is obese.   HENT:      Head: Normocephalic and atraumatic.   Eyes:      Extraocular Movements: Extraocular movements intact.   Cardiovascular:      Rate and Rhythm: Normal rate and regular rhythm.   Pulmonary:      Effort: Pulmonary effort is normal.   Abdominal:      General: Abdomen is flat.      Palpations: Abdomen is soft.   Musculoskeletal:         General: Normal range of motion.      Cervical back: Normal range of motion.   Skin:     General: Skin is warm and dry.      Capillary Refill: Capillary refill takes less than 2 seconds.   Neurological:      General: No focal deficit present.       Diagnostic Results: Reviewed  Carotid US 6/28/22  RIGHT SIDE:   1-39% Right ICA stenosis.   Heterogeneous plaque noted in the right internal carotid artery.   Heterogeneous plaque noted in the right common carotid artery.   Antegrade flow noted in the right vertebral artery.       LEFT SIDE:   1-39% Left ICA stenosis.   Heterogeneous plaque noted in the left internal carotid artery.   Heterogeneous plaque noted in the left common carotid artery.   Antegrade flow noted in the left vertebral artery.     [facility] 6/2/2022  Left main coronary artery showed luminal irregularity.     Left anterior descending artery had multiple 25-30% lesions, but nothing obstructive.     Left circumflex artery is a nondominant vessel with multiple 25-30% lesions, but nothing obstructive.     The right coronary artery is a dominant vessel and is slightly aneurysmal in multiple areas, but has no more than 25-30% stenosis.     No LV-gram was done.    RUCHI 5/17/22:  · Moderately decreased systolic function.  · The estimated ejection  fraction is 35%.  · Left ventricular diastolic dysfunction.  · Normal right ventricular size with normal right ventricular systolic function.  · Severe left atrial enlargement.  · Mild right atrial enlargement.  · Mild aortic regurgitation.  · Severe mitral regurgitation.  · A 200 J synchronized cardioversion was successfully performed with restoration of normal sinus rhythm.  Assessment:   1. Severe Mitral Valve Regurgitation  Plan:   Pre-Op MVr/MAZE  .      CTS Attending Note:    I have personally taken the history and examined this patient and agree with the JJ's note as stated above.  Very pleasant 72-year-old gentleman with severe mitral regurgitation and atrial fibrillation.  I reviewed his angiogram.  There are no hemodynamically significant lesions.  We plan mitral valve repair and Maze procedure.  He indicated he desires a tissue valve should repair not be feasible.  His questions have been answered, and he wishes to proceed.

## 2022-06-28 NOTE — H&P (VIEW-ONLY)
Subjective:      Patient ID: Artemio Ogden is a 72 y.o. male.     Chief Complaint: No chief complaint on file.     HPI:  Artemio Ogden is a 72 y.o. male who presents for pre-op MVR. He has a medical history significant for afib, mitral regurgitation, GERD, MARCOS, and tyroid disease.   He reports that he has known about his mitral valve for many year but recently found out that he has advanced from moderate to severe regurgitation. He reports a history of atrial fibrillation.  His most recent echo reveals an ejection fraction is 35%.  He reports orthopnea, dyspnea on exertion, fatigue, and palpations.      Family and social history reviewed     Review of patient's allergies indicates:  No Known Allergies       Past Medical History:   Diagnosis Date    A-fib      Digestive disorder       reflux    Sleep apnea       No CPAP    Thyroid disease              Past Surgical History:   Procedure Laterality Date    CARDIAC SURGERY         AICD    EYE SURGERY Left 04/05/2012     Cataract     EYE SURGERY Left 02/14/2013     YAG Laser    EYE SURGERY Bilateral 01/14/2011     Lasik     HERNIA REPAIR   01/01/1967    PROSTATE SURGERY   01/01/1999    THYROID LOBECTOMY   09/11/2006          Family History      Family history is unknown by patient.          Social History               Socioeconomic History    Marital status:    Tobacco Use    Smoking status: Former Smoker    Smokeless tobacco: Never Used   Substance and Sexual Activity    Alcohol use: No    Drug use: No    Sexual activity: Yes       Partners: Female            Current Outpatient Medications   Medication Instructions    albuterol (PROVENTIL/VENTOLIN HFA) 90 mcg/actuation inhaler Inhale 2 puffs every 4 hours by inhalation route as needed.    apixaban (ELIQUIS) 5 mg, Oral, 2 times daily    ARGININE HCL, L-ARGININE, ORAL 750 mg, Oral, Daily    arginine oxoglurate (L-ARGININE,ALPHA-KETOGLUTARAT,) 350 mg TbSR L-Arginine(alpha-ketoglutarat)  "   ascorbic acid (vitamin C) (VITAMIN C) 1,500 mg, Oral, 2 times daily    aspirin 325 mg, Oral, Daily    atenoloL (TENORMIN) 50 mg, Oral, Daily    azelastine (ASTELIN) 137 mcg (0.1 %) nasal spray 1 spray, Nasal, 2 times daily    BD LUER-KHADRA SYRINGE 3 mL 21 x 1 1/2" Syrg No dose, route, or frequency recorded.    cetirizine (ZYRTEC) 10 mg, Oral, Daily    cholecalciferol (vitamin D3) (VITAMIN D3) 5,000 Units, Oral, Daily    cholecalciferol, vitamin D3, 10 mcg (400 unit) Cap capsule Vitamin D3    co-enzyme Q-10 400 mg, Oral, 2 times daily    diphenhydrAMINE (BENADRYL) 5 mg, Oral, Every 6 hours PRN    fexofenadine (ALLEGRA) 180 MG tablet Take 1 tablet every day by oral route.    furosemide (LASIX) 20 mg, Oral, 3 times daily    guaiFENesin (HUMIBID E) 400 mg Tab guaifenesin   400 mg 2 in the pm as needed    levothyroxine (SYNTHROID) 100 mcg, Oral, Before breakfast    liothyronine (CYTOMEL) 5 mcg, Oral, Daily    magnesium glycinate-mag oxide 120 mg, Oral, 2 times daily    mometasone (NASONEX) 50 mcg/actuation nasal spray 2 sprays, Nasal, Daily    montelukast (SINGULAIR) 10 mg, Oral, Daily    potassium chloride (KLOR-CON) 10 MEQ TbSR 10 mEq, Oral, 2 times daily    QUERCETIN DIHYDRATE, BULK, MISC 500 mg, Misc.(Non-Drug; Combo Route)    tadalafiL (CIALIS) 5 mg, Oral, Daily, Patient states he takes one 5mg tablet nightly    testosterone cypionate (DEPOTESTOTERONE CYPIONATE) 100 mg, Intramuscular, Every 7 days    vitamin A 5,000 Units, Oral, Daily    zinc sulfate (ZINCATE) 220 mg, Oral, Daily         Current medications Reviewed     Review of Systems   Constitutional: Positive for fatigue. Negative for activity change, appetite change and fever.   HENT: Negative for nosebleeds.    Respiratory: Positive for shortness of breath. Negative for cough.    Cardiovascular: Positive for palpitations. Negative for chest pain and leg swelling.        Orthopnea   Gastrointestinal: Negative for abdominal distention, " abdominal pain and nausea.   Genitourinary: Negative for frequency.   Musculoskeletal: Negative for arthralgias and myalgias.   Skin: Negative for rash.   Neurological: Negative for dizziness and numbness.   Hematological: Does not bruise/bleed easily.      Objective:   Physical Exam  Constitutional:       Appearance: He is obese.   HENT:      Head: Normocephalic and atraumatic.   Eyes:      Extraocular Movements: Extraocular movements intact.   Cardiovascular:      Rate and Rhythm: Normal rate and regular rhythm.   Pulmonary:      Effort: Pulmonary effort is normal.   Abdominal:      General: Abdomen is flat.      Palpations: Abdomen is soft.   Musculoskeletal:         General: Normal range of motion.      Cervical back: Normal range of motion.   Skin:     General: Skin is warm and dry.      Capillary Refill: Capillary refill takes less than 2 seconds.   Neurological:      General: No focal deficit present.       Diagnostic Results: Reviewed  Carotid US 6/28/22  RIGHT SIDE:   1-39% Right ICA stenosis.   Heterogeneous plaque noted in the right internal carotid artery.   Heterogeneous plaque noted in the right common carotid artery.   Antegrade flow noted in the right vertebral artery.       LEFT SIDE:   1-39% Left ICA stenosis.   Heterogeneous plaque noted in the left internal carotid artery.   Heterogeneous plaque noted in the left common carotid artery.   Antegrade flow noted in the left vertebral artery.     [facility] 6/2/2022  Left main coronary artery showed luminal irregularity.     Left anterior descending artery had multiple 25-30% lesions, but nothing obstructive.     Left circumflex artery is a nondominant vessel with multiple 25-30% lesions, but nothing obstructive.     The right coronary artery is a dominant vessel and is slightly aneurysmal in multiple areas, but has no more than 25-30% stenosis.     No LV-gram was done.    RUCHI 5/17/22:  · Moderately decreased systolic function.  · The estimated ejection  fraction is 35%.  · Left ventricular diastolic dysfunction.  · Normal right ventricular size with normal right ventricular systolic function.  · Severe left atrial enlargement.  · Mild right atrial enlargement.  · Mild aortic regurgitation.  · Severe mitral regurgitation.  · A 200 J synchronized cardioversion was successfully performed with restoration of normal sinus rhythm.  Assessment:   1. Severe Mitral Valve Regurgitation  Plan:   Pre-Op MVr/MAZE  .      CTS Attending Note:    I have personally taken the history and examined this patient and agree with the JJ's note as stated above.  Very pleasant 72-year-old gentleman with severe mitral regurgitation and atrial fibrillation.  I reviewed his angiogram.  There are no hemodynamically significant lesions.  We plan mitral valve repair and Maze procedure.  He indicated he desires a tissue valve should repair not be feasible.  His questions have been answered, and he wishes to proceed.

## 2022-06-29 ENCOUNTER — ANESTHESIA EVENT (OUTPATIENT)
Dept: SURGERY | Facility: HOSPITAL | Age: 72
DRG: 220 | End: 2022-06-29
Payer: MEDICARE

## 2022-06-29 ENCOUNTER — TELEPHONE (OUTPATIENT)
Dept: CARDIOTHORACIC SURGERY | Facility: CLINIC | Age: 72
End: 2022-06-29
Payer: MEDICARE

## 2022-06-29 NOTE — ANESTHESIA PREPROCEDURE EVALUATION
[facility]  Anesthesia Pre-Operative Evaluation         Patient Name: Artemio Ogden  YOB: 1950  MRN: [id number]    SUBJECTIVE:     Pre-operative evaluation for Procedure(s) (LRB):  Valvuloplasty, Mitral (N/A)  REPLACEMENT, MITRAL VALVE (N/A)  MAZE (N/A)     06/29/2022  Artemio Ogden is a 72 y.o. male w/ a significant PMHx of CAD, HTN, CHF, pAFib s/p DCCV (on eliquis), known MR, thyroid disease, sleep apnea (no CPAP), and GERD. Patient presented referred to [facility] for worsening MV insufficiency from moderate to severe, associated with sx of orthopnea, VILLANUEVA, fatigue, and palpitations.    Patient now presents for the above procedure(s).    RUCHI 5/27/22  · Moderately decreased systolic function.  · The estimated ejection fraction is 35%.  · Left ventricular diastolic dysfunction.  · Normal right ventricular size with normal right ventricular systolic function.  · Severe left atrial enlargement.  · Mild right atrial enlargement.  · Mild aortic regurgitation.  · Severe mitral regurgitation.  · A 200 J synchronized cardioversion was successfully performed with restoration of normal sinus rhythm.    [facility] 6/2/22  RESULTS:  Left main coronary artery showed luminal irregularity.     Left anterior descending artery had multiple 25-30% lesions, but nothing obstructive.      Left circumflex artery is a nondominant vessel with multiple 25-30% lesions, but nothing obstructive.     The right coronary artery is a dominant vessel and is slightly aneurysmal in multiple areas, but has no more than 25-30% stenosis.    US Carotid Bilateral 6/28/22  RIGHT SIDE:   1-39% Right ICA stenosis.   Heterogeneous plaque noted in the right internal carotid artery.   Heterogeneous plaque noted in the right common carotid artery.   Antegrade flow noted in the right vertebral artery.     LEFT SIDE:   1-39% Left ICA stenosis.   Heterogeneous plaque noted in the left internal carotid artery.   Heterogeneous plaque noted in  "the left common carotid artery.   Antegrade flow noted in the left vertebral artery.          LDA: None documented.      Prev airway: None documented.      Drips: None documented.       Patient Active Problem List   Diagnosis    Spinal stenosis, lumbar region without neurogenic claudication    Vitamin B12 deficiency    Vitamin D deficiency    History of hepatitis    Mitral regurgitation    Paroxysmal atrial fibrillation    Pre-op exam    Bilateral carotid artery stenosis       Review of patient's allergies indicates:   Allergen Reactions    Ace inhibitors      Other reaction(s): cough    Arb-angiotensin receptor antagonist      Other reaction(s): cough       Current Outpatient Medications:  No current facility-administered medications for this encounter.    Current Outpatient Medications:     albuterol (PROVENTIL/VENTOLIN HFA) 90 mcg/actuation inhaler, Inhale 2 puffs every 4 hours by inhalation route as needed., Disp: , Rfl:     apixaban (ELIQUIS) 5 mg Tab, Take 5 mg by mouth 2 (two) times daily., Disp: , Rfl:     ARGININE HCL, L-ARGININE, ORAL, Take 750 mg by mouth once daily., Disp: , Rfl:     arginine oxoglurate (L-ARGININE,ALPHA-KETOGLUTARAT,) 350 mg TbSR, L-Arginine(alpha-ketoglutarat), Disp: , Rfl:     ascorbic acid, vitamin C, (VITAMIN C) 1000 MG tablet, Take 1,500 mg by mouth 2 (two) times daily., Disp: , Rfl:     aspirin 325 MG tablet, Take 325 mg by mouth once daily., Disp: , Rfl:     atenoloL (TENORMIN) 50 MG tablet, Take 50 mg by mouth once daily., Disp: , Rfl:     azelastine (ASTELIN) 137 mcg (0.1 %) nasal spray, 1 spray by Nasal route 2 (two) times daily., Disp: , Rfl:     BD LUER-KHADRA SYRINGE 3 mL 21 x 1 1/2" Syrg, , Disp: , Rfl: 2    cetirizine (ZYRTEC) 10 MG tablet, Take 10 mg by mouth once daily., Disp: , Rfl:     cholecalciferol, vitamin D3, (VITAMIN D3) 50 mcg (2,000 unit) Tab, Take 5,000 Units by mouth once daily., Disp: , Rfl:     cholecalciferol, vitamin D3, 10 mcg (400 " unit) Cap capsule, Vitamin D3, Disp: , Rfl:     co-enzyme Q-10 30 mg capsule, Take 400 mg by mouth 2 (two) times daily., Disp: , Rfl:     diphenhydrAMINE (BENADRYL) 25 mg capsule, Take 5 mg by mouth every 6 (six) hours as needed for Itching., Disp: , Rfl:     fexofenadine (ALLEGRA) 180 MG tablet, Take 1 tablet every day by oral route., Disp: , Rfl:     furosemide (LASIX) 20 MG tablet, Take 20 mg by mouth 3 (three) times daily., Disp: , Rfl:     guaiFENesin (HUMIBID E) 400 mg Tab, guaifenesin  400 mg 2 in the pm as needed, Disp: , Rfl:     levothyroxine (SYNTHROID) 100 MCG tablet, Take 100 mcg by mouth before breakfast., Disp: , Rfl:     liothyronine (CYTOMEL) 5 MCG Tab, Take 5 mcg by mouth once daily., Disp: , Rfl: 2    magnesium glycinate-mag oxide 120 mg magnesium Cap, Take 120 mg by mouth 2 (two) times a day., Disp: , Rfl:     mometasone (NASONEX) 50 mcg/actuation nasal spray, 2 sprays by Nasal route once daily., Disp: , Rfl:     montelukast (SINGULAIR) 10 mg tablet, Take 10 mg by mouth once daily., Disp: , Rfl: 3    potassium chloride (KLOR-CON) 10 MEQ TbSR, Take 10 mEq by mouth 2 (two) times a day., Disp: , Rfl:     QUERCETIN DIHYDRATE, BULK, MISC, 500 mg by Misc.(Non-Drug; Combo Route) route., Disp: , Rfl:     tadalafil (CIALIS) 20 MG Tab, Take 5 mg by mouth once daily. Patient states he takes one 5mg tablet nightly, Disp: , Rfl:     testosterone cypionate (DEPOTESTOTERONE CYPIONATE) 200 mg/mL injection, Inject 100 mg into the muscle every 7 days., Disp: , Rfl:     vitamin A 44276 UNIT capsule, Take 5,000 Units by mouth once daily., Disp: , Rfl:     zinc sulfate (ZINCATE) 50 mg zinc (220 mg) capsule, Take 220 mg by mouth once daily., Disp: , Rfl:     Past Surgical History:   Procedure Laterality Date    CARDIAC SURGERY      AICD    EYE SURGERY Left 04/05/2012    Cataract     EYE SURGERY Left 02/14/2013    YAG Laser    EYE SURGERY Bilateral 01/14/2011    Lasik     HERNIA REPAIR  01/01/1967     LEFT HEART CATHETERIZATION Left 6/2/2022    Procedure: CATHETERIZATION, HEART, LEFT;  Surgeon: Nikhil Lechuga III, MD;  Location[facility] CATH;  Service: Cardiology;  Laterality: Left;    PROSTATE SURGERY  01/01/1999    THYROID LOBECTOMY  09/11/2006       Social History     Socioeconomic History    Marital status:    Tobacco Use    Smoking status: Former Smoker    Smokeless tobacco: Never Used   Substance and Sexual Activity    Alcohol use: No    Drug use: No    Sexual activity: Yes     Partners: Female       OBJECTIVE:     Vital Signs Range (Last 24H):         Significant Labs:  Lab Results   Component Value Date    WBC 6.93 06/28/2022    HGB 14.6 06/28/2022    HCT 47.4 06/28/2022     06/28/2022    ALT 23 06/28/2022    AST 23 06/28/2022     06/28/2022    K 4.1 06/28/2022     06/28/2022    CREATININE 1.2 06/28/2022    BUN 17 06/28/2022    CO2 27 06/28/2022    INR 1.0 06/28/2022    HGBA1C 5.8 (H) 06/28/2022       Microbiology Results (last 7 days)     ** No results found for the last 168 hours. **          Diagnostic Studies:    EKG:   Results for orders placed or performed during the hospital encounter of 06/28/22   EKG 12-lead    Collection Time: 06/28/22 11:38 AM    Narrative    Test Reason : I34.0,I48.0,    Vent. Rate : 084 BPM     Atrial Rate : 079 BPM     P-R Int : 170 ms          QRS Dur : 178 ms      QT Int : 432 ms       P-R-T Axes : 000 147 016 degrees     QTc Int : 510 ms    AV dual-paced rhythm with frequent Premature ventricular complexes  Abnormal ECG  When compared with ECG of 02-JUN-2022 10:28,  Vent. rate has increased BY  15 BPM  Premature ventricular complexes are now present  Confirmed by Lotus Robertson MD ([id number]) on 6/28/2022 1:26:58 PM    Referred By: MARIA WILLIAM           Confirmed By:Lotus Robertson MD       2D ECHO:  TTE:  No results found for this or any previous visit.      RUCHI:  No results found for this or any previous visit.    ASSESSMENT/PLAN:            Pre-op Assessment    I have reviewed the Patient Summary Reports.     I have reviewed the Nursing Notes. I have reviewed the NPO Status.   I have reviewed the Medications.     Review of Systems  Anesthesia Hx:  No problems with previous Anesthesia Denies Hx of Anesthetic complications  History of prior surgery of interest to airway management or planning: Denies Family Hx of Anesthesia complications.   Denies Personal Hx of Anesthesia complications.   Social:  Former Smoker, No Alcohol Use    Hematology/Oncology:  Hematology Normal        Cardiovascular:   Hypertension Valvular problems/Murmurs, MR CAD   Denies CABG/stent. Dysrhythmias atrial fibrillation CHF ECG has been reviewed.    Pulmonary:   Denies COPD.  Denies Asthma.  Denies Recent URI. Sleep Apnea    Renal/:  Renal/ Normal     Hepatic/GI:   GERD Denies Liver Disease.    Musculoskeletal:  Spine Disorders: lumbar    Neurological:  Neurology Normal    Endocrine:   Hypothyroidism    Psych:  Psychiatric Normal           Physical Exam  General: Well nourished, Cooperative, Alert and Oriented    Airway:  Mallampati: II   Mouth Opening: Normal  TM Distance: Normal  Tongue: Normal  Neck ROM: Normal ROM    Chest/Lungs:  Normal Respiratory Rate    Heart:  Rate: Normal        Anesthesia Plan  Type of Anesthesia, risks & benefits discussed:    Anesthesia Type: Gen ETT  Intra-op Monitoring Plan: Standard ASA Monitors, Art Line, Central Line, RUCHI and PA  Post Op Pain Control Plan: multimodal analgesia and IV/PO Opioids PRN  Induction:  IV  Airway Plan: Direct and Video, Post-Induction  Informed Consent: Informed consent signed with the Patient and all parties understand the risks and agree with anesthesia plan.  All questions answered. Patient consented to blood products? Yes  ASA Score: 4  Day of Surgery Review of History & Physical: H&P Update referred to the surgeon/provider.    Ready For Surgery From Anesthesia Perspective.     .

## 2022-06-29 NOTE — TELEPHONE ENCOUNTER
Called pt and informed him of arrival time for surgery.  Pt instructed to report to [facility] at 5:00 tomorrow morning.  Pt reminded to perform Hibiclens shower tonight and tomorrow morning, and to become NPO at midnight.  Pt verbalized understanding.

## 2022-06-30 ENCOUNTER — HOSPITAL ENCOUNTER (INPATIENT)
Facility: HOSPITAL | Age: 72
LOS: 7 days | Discharge: NURSING, REHAB OR LONG-TERM CARE | DRG: 220 | End: 2022-07-07
Attending: THORACIC SURGERY (CARDIOTHORACIC VASCULAR SURGERY) | Admitting: THORACIC SURGERY (CARDIOTHORACIC VASCULAR SURGERY)
Payer: MEDICARE

## 2022-06-30 ENCOUNTER — DOCUMENTATION ONLY (OUTPATIENT)
Dept: ELECTROPHYSIOLOGY | Facility: CLINIC | Age: 72
End: 2022-06-30
Payer: MEDICARE

## 2022-06-30 ENCOUNTER — ANESTHESIA (OUTPATIENT)
Dept: SURGERY | Facility: HOSPITAL | Age: 72
DRG: 220 | End: 2022-06-30
Payer: MEDICARE

## 2022-06-30 VITALS — RESPIRATION RATE: 16 BRPM

## 2022-06-30 DIAGNOSIS — Z99.11 ENCOUNTER FOR WEANING FROM VENTILATOR: ICD-10-CM

## 2022-06-30 DIAGNOSIS — Z98.890 S/P MAZE OPERATION FOR ATRIAL FIBRILLATION: ICD-10-CM

## 2022-06-30 DIAGNOSIS — Z86.79 S/P MAZE OPERATION FOR ATRIAL FIBRILLATION: ICD-10-CM

## 2022-06-30 DIAGNOSIS — Z98.890 S/P MITRAL VALVE REPAIR: ICD-10-CM

## 2022-06-30 DIAGNOSIS — I48.0 PAROXYSMAL ATRIAL FIBRILLATION: ICD-10-CM

## 2022-06-30 DIAGNOSIS — Z98.890 S/P MVR (MITRAL VALVE REPAIR): ICD-10-CM

## 2022-06-30 DIAGNOSIS — I34.0 MITRAL VALVE INSUFFICIENCY, UNSPECIFIED ETIOLOGY: ICD-10-CM

## 2022-06-30 DIAGNOSIS — R73.9 STRESS HYPERGLYCEMIA: ICD-10-CM

## 2022-06-30 DIAGNOSIS — I49.9 ARRHYTHMIA: ICD-10-CM

## 2022-06-30 DIAGNOSIS — I34.0 MITRAL REGURGITATION: ICD-10-CM

## 2022-06-30 DIAGNOSIS — Z98.890 S/P MITRAL VALVE REPAIR: Primary | ICD-10-CM

## 2022-06-30 LAB
ALBUMIN SERPL BCP-MCNC: 2.7 G/DL (ref 3.5–5.2)
ALLENS TEST: ABNORMAL
ALP SERPL-CCNC: 45 U/L (ref 55–135)
ALT SERPL W/O P-5'-P-CCNC: 19 U/L (ref 10–44)
ANION GAP SERPL CALC-SCNC: 12 MMOL/L (ref 8–16)
ANION GAP SERPL CALC-SCNC: 17 MMOL/L (ref 8–16)
APTT BLDCRRT: 36.3 SEC (ref 21–32)
AST SERPL-CCNC: 90 U/L (ref 10–40)
BASOPHILS # BLD AUTO: 0.05 K/UL (ref 0–0.2)
BASOPHILS NFR BLD: 0.2 % (ref 0–1.9)
BILIRUB SERPL-MCNC: 1 MG/DL (ref 0.1–1)
BLD PROD TYP BPU: NORMAL
BLOOD UNIT EXPIRATION DATE: NORMAL
BLOOD UNIT TYPE CODE: 5100
BLOOD UNIT TYPE: NORMAL
BUN SERPL-MCNC: 18 MG/DL (ref 8–23)
BUN SERPL-MCNC: 22 MG/DL (ref 8–23)
CA-I BLDV-SCNC: 1.13 MMOL/L (ref 1.06–1.42)
CALCIUM SERPL-MCNC: 8.2 MG/DL (ref 8.7–10.5)
CALCIUM SERPL-MCNC: 8.5 MG/DL (ref 8.7–10.5)
CHLORIDE SERPL-SCNC: 113 MMOL/L (ref 95–110)
CHLORIDE SERPL-SCNC: 113 MMOL/L (ref 95–110)
CO2 SERPL-SCNC: 16 MMOL/L (ref 23–29)
CO2 SERPL-SCNC: 18 MMOL/L (ref 23–29)
CODING SYSTEM: NORMAL
CREAT SERPL-MCNC: 1.5 MG/DL (ref 0.5–1.4)
CREAT SERPL-MCNC: 1.8 MG/DL (ref 0.5–1.4)
DELSYS: ABNORMAL
DIFFERENTIAL METHOD: ABNORMAL
DISPENSE STATUS: NORMAL
EOSINOPHIL # BLD AUTO: 0 K/UL (ref 0–0.5)
EOSINOPHIL NFR BLD: 0.2 % (ref 0–8)
ERYTHROCYTE [DISTWIDTH] IN BLOOD BY AUTOMATED COUNT: 15.3 % (ref 11.5–14.5)
ERYTHROCYTE [SEDIMENTATION RATE] IN BLOOD BY WESTERGREN METHOD: 20 MM/H
ERYTHROCYTE [SEDIMENTATION RATE] IN BLOOD BY WESTERGREN METHOD: 20 MM/H
ERYTHROCYTE [SEDIMENTATION RATE] IN BLOOD BY WESTERGREN METHOD: 22 MM/H
ERYTHROCYTE [SEDIMENTATION RATE] IN BLOOD BY WESTERGREN METHOD: 24 MM/H
EST. GFR  (AFRICAN AMERICAN): 41.9 ML/MIN/1.73 M^2
EST. GFR  (AFRICAN AMERICAN): 52.3 ML/MIN/1.73 M^2
EST. GFR  (NON AFRICAN AMERICAN): 36.3 ML/MIN/1.73 M^2
EST. GFR  (NON AFRICAN AMERICAN): 45.2 ML/MIN/1.73 M^2
FIBRINOGEN PPP-MCNC: 212 MG/DL (ref 182–400)
FIO2: 60
GLUCOSE SERPL-MCNC: 116 MG/DL (ref 70–110)
GLUCOSE SERPL-MCNC: 201 MG/DL (ref 70–110)
GLUCOSE SERPL-MCNC: 219 MG/DL (ref 70–110)
GLUCOSE SERPL-MCNC: 220 MG/DL (ref 70–110)
HCO3 UR-SCNC: 15.6 MMOL/L (ref 24–28)
HCO3 UR-SCNC: 16.1 MMOL/L (ref 24–28)
HCO3 UR-SCNC: 17.6 MMOL/L (ref 24–28)
HCO3 UR-SCNC: 17.7 MMOL/L (ref 24–28)
HCO3 UR-SCNC: 18.7 MMOL/L (ref 24–28)
HCO3 UR-SCNC: 19 MMOL/L (ref 24–28)
HCO3 UR-SCNC: 19.8 MMOL/L (ref 24–28)
HCO3 UR-SCNC: 23.5 MMOL/L (ref 24–28)
HCO3 UR-SCNC: 24 MMOL/L (ref 24–28)
HCT VFR BLD AUTO: 42.4 % (ref 40–54)
HCT VFR BLD CALC: 30 %PCV (ref 36–54)
HCT VFR BLD CALC: 30 %PCV (ref 36–54)
HCT VFR BLD CALC: 31 %PCV (ref 36–54)
HCT VFR BLD CALC: 31 %PCV (ref 36–54)
HCT VFR BLD CALC: 35 %PCV (ref 36–54)
HCT VFR BLD CALC: 37 %PCV (ref 36–54)
HCT VFR BLD CALC: 39 %PCV (ref 36–54)
HCT VFR BLD CALC: 39 %PCV (ref 36–54)
HGB BLD-MCNC: 13 G/DL (ref 14–18)
IMM GRANULOCYTES # BLD AUTO: 0.23 K/UL (ref 0–0.04)
IMM GRANULOCYTES NFR BLD AUTO: 1.1 % (ref 0–0.5)
INR PPP: 1.3 (ref 0.8–1.2)
LDH SERPL L TO P-CCNC: 10.59 MMOL/L (ref 0.36–1.25)
LDH SERPL L TO P-CCNC: 11.06 MMOL/L (ref 0.36–1.25)
LDH SERPL L TO P-CCNC: 11.28 MMOL/L (ref 0.36–1.25)
LDH SERPL L TO P-CCNC: 6.27 MMOL/L (ref 0.36–1.25)
LDH SERPL L TO P-CCNC: 6.53 MMOL/L (ref 0.36–1.25)
LDH SERPL L TO P-CCNC: 9.19 MMOL/L (ref 0.36–1.25)
LYMPHOCYTES # BLD AUTO: 2.7 K/UL (ref 1–4.8)
LYMPHOCYTES NFR BLD: 12.5 % (ref 18–48)
MAGNESIUM SERPL-MCNC: 2.6 MG/DL (ref 1.6–2.6)
MAGNESIUM SERPL-MCNC: 2.7 MG/DL (ref 1.6–2.6)
MCH RBC QN AUTO: 25.7 PG (ref 27–31)
MCHC RBC AUTO-ENTMCNC: 30.7 G/DL (ref 32–36)
MCV RBC AUTO: 84 FL (ref 82–98)
MODE: ABNORMAL
MONOCYTES # BLD AUTO: 1.6 K/UL (ref 0.3–1)
MONOCYTES NFR BLD: 7.4 % (ref 4–15)
NEUTROPHILS # BLD AUTO: 17 K/UL (ref 1.8–7.7)
NEUTROPHILS NFR BLD: 78.6 % (ref 38–73)
NRBC BLD-RTO: 0 /100 WBC
NUM UNITS TRANS FFP: NORMAL
NUM UNITS TRANS PACKED RBC: NORMAL
NUM UNITS TRANS PACKED RBC: NORMAL
PCO2 BLDA: 36.3 MMHG (ref 35–45)
PCO2 BLDA: 36.7 MMHG (ref 35–45)
PCO2 BLDA: 36.9 MMHG (ref 35–45)
PCO2 BLDA: 37 MMHG (ref 35–45)
PCO2 BLDA: 39.4 MMHG (ref 35–45)
PCO2 BLDA: 41.8 MMHG (ref 35–45)
PCO2 BLDA: 45.2 MMHG (ref 35–45)
PCO2 BLDA: 47 MMHG (ref 35–45)
PCO2 BLDA: 51.8 MMHG (ref 35–45)
PEEP: 5
PH SMN: 7.19 [PH] (ref 7.35–7.45)
PH SMN: 7.23 [PH] (ref 7.35–7.45)
PH SMN: 7.23 [PH] (ref 7.35–7.45)
PH SMN: 7.25 [PH] (ref 7.35–7.45)
PH SMN: 7.26 [PH] (ref 7.35–7.45)
PH SMN: 7.26 [PH] (ref 7.35–7.45)
PH SMN: 7.29 [PH] (ref 7.35–7.45)
PH SMN: 7.31 [PH] (ref 7.35–7.45)
PH SMN: 7.42 [PH] (ref 7.35–7.45)
PHOSPHATE SERPL-MCNC: 4.6 MG/DL (ref 2.7–4.5)
PLATELET # BLD AUTO: 99 K/UL (ref 150–450)
PLATELET BLD QL SMEAR: ABNORMAL
PMV BLD AUTO: 10.1 FL (ref 9.2–12.9)
PO2 BLDA: 116 MMHG (ref 80–100)
PO2 BLDA: 118 MMHG (ref 80–100)
PO2 BLDA: 135 MMHG (ref 80–100)
PO2 BLDA: 145 MMHG (ref 80–100)
PO2 BLDA: 154 MMHG (ref 80–100)
PO2 BLDA: 329 MMHG (ref 80–100)
PO2 BLDA: 38 MMHG (ref 40–60)
PO2 BLDA: 450 MMHG (ref 80–100)
PO2 BLDA: 95 MMHG (ref 80–100)
POC BE: -11 MMOL/L
POC BE: -12 MMOL/L
POC BE: -3 MMOL/L
POC BE: -8 MMOL/L
POC BE: -9 MMOL/L
POC BE: -9 MMOL/L
POC BE: 0 MMOL/L
POC IONIZED CALCIUM: 1.11 MMOL/L (ref 1.06–1.42)
POC IONIZED CALCIUM: 1.13 MMOL/L (ref 1.06–1.42)
POC IONIZED CALCIUM: 1.15 MMOL/L (ref 1.06–1.42)
POC IONIZED CALCIUM: 1.15 MMOL/L (ref 1.06–1.42)
POC IONIZED CALCIUM: 1.18 MMOL/L (ref 1.06–1.42)
POC IONIZED CALCIUM: 1.21 MMOL/L (ref 1.06–1.42)
POC SATURATED O2: 100 % (ref 95–100)
POC SATURATED O2: 100 % (ref 95–100)
POC SATURATED O2: 58 % (ref 95–100)
POC SATURATED O2: 96 % (ref 95–100)
POC SATURATED O2: 98 % (ref 95–100)
POC SATURATED O2: 98 % (ref 95–100)
POC SATURATED O2: 99 % (ref 95–100)
POC TCO2: 17 MMOL/L (ref 23–27)
POC TCO2: 17 MMOL/L (ref 23–27)
POC TCO2: 19 MMOL/L (ref 23–27)
POC TCO2: 19 MMOL/L (ref 23–27)
POC TCO2: 20 MMOL/L (ref 23–27)
POC TCO2: 20 MMOL/L (ref 23–27)
POC TCO2: 21 MMOL/L (ref 24–29)
POC TCO2: 25 MMOL/L (ref 23–27)
POC TCO2: 25 MMOL/L (ref 23–27)
POCT GLUCOSE: 161 MG/DL (ref 70–110)
POCT GLUCOSE: 166 MG/DL (ref 70–110)
POCT GLUCOSE: 166 MG/DL (ref 70–110)
POCT GLUCOSE: 189 MG/DL (ref 70–110)
POTASSIUM BLD-SCNC: 3.2 MMOL/L (ref 3.5–5.1)
POTASSIUM BLD-SCNC: 3.2 MMOL/L (ref 3.5–5.1)
POTASSIUM BLD-SCNC: 3.5 MMOL/L (ref 3.5–5.1)
POTASSIUM BLD-SCNC: 3.7 MMOL/L (ref 3.5–5.1)
POTASSIUM BLD-SCNC: 3.8 MMOL/L (ref 3.5–5.1)
POTASSIUM BLD-SCNC: 3.9 MMOL/L (ref 3.5–5.1)
POTASSIUM BLD-SCNC: 3.9 MMOL/L (ref 3.5–5.1)
POTASSIUM BLD-SCNC: 4 MMOL/L (ref 3.5–5.1)
POTASSIUM SERPL-SCNC: 3.6 MMOL/L (ref 3.5–5.1)
POTASSIUM SERPL-SCNC: 3.6 MMOL/L (ref 3.5–5.1)
POTASSIUM SERPL-SCNC: 4.3 MMOL/L (ref 3.5–5.1)
PROT SERPL-MCNC: 5 G/DL (ref 6–8.4)
PROTHROMBIN TIME: 13 SEC (ref 9–12.5)
RBC # BLD AUTO: 5.06 M/UL (ref 4.6–6.2)
SAMPLE: ABNORMAL
SITE: ABNORMAL
SODIUM BLD-SCNC: 142 MMOL/L (ref 136–145)
SODIUM BLD-SCNC: 142 MMOL/L (ref 136–145)
SODIUM BLD-SCNC: 144 MMOL/L (ref 136–145)
SODIUM BLD-SCNC: 145 MMOL/L (ref 136–145)
SODIUM BLD-SCNC: 146 MMOL/L (ref 136–145)
SODIUM BLD-SCNC: 146 MMOL/L (ref 136–145)
SODIUM BLD-SCNC: 147 MMOL/L (ref 136–145)
SODIUM BLD-SCNC: 148 MMOL/L (ref 136–145)
SODIUM SERPL-SCNC: 143 MMOL/L (ref 136–145)
SODIUM SERPL-SCNC: 146 MMOL/L (ref 136–145)
VT: 480
WBC # BLD AUTO: 21.63 K/UL (ref 3.9–12.7)

## 2022-06-30 PROCEDURE — 63600175 PHARM REV CODE 636 W HCPCS: Performed by: STUDENT IN AN ORGANIZED HEALTH CARE EDUCATION/TRAINING PROGRAM

## 2022-06-30 PROCEDURE — 37799 UNLISTED PX VASCULAR SURGERY: CPT

## 2022-06-30 PROCEDURE — 33259 ABLATE ATRIA W/BYPASS ADD-ON: CPT | Mod: GC,,, | Performed by: THORACIC SURGERY (CARDIOTHORACIC VASCULAR SURGERY)

## 2022-06-30 PROCEDURE — 27200966 HC CLOSED SUCTION SYSTEM

## 2022-06-30 PROCEDURE — 93320 DOPPLER ECHO COMPLETE: CPT | Mod: 26,,, | Performed by: ANESTHESIOLOGY

## 2022-06-30 PROCEDURE — 25000003 PHARM REV CODE 250: Performed by: STUDENT IN AN ORGANIZED HEALTH CARE EDUCATION/TRAINING PROGRAM

## 2022-06-30 PROCEDURE — 37000008 HC ANESTHESIA 1ST 15 MINUTES: Performed by: THORACIC SURGERY (CARDIOTHORACIC VASCULAR SURGERY)

## 2022-06-30 PROCEDURE — 94761 N-INVAS EAR/PLS OXIMETRY MLT: CPT

## 2022-06-30 PROCEDURE — 36415 COLL VENOUS BLD VENIPUNCTURE: CPT | Performed by: THORACIC SURGERY (CARDIOTHORACIC VASCULAR SURGERY)

## 2022-06-30 PROCEDURE — 88304 TISSUE EXAM BY PATHOLOGIST: CPT | Performed by: PATHOLOGY

## 2022-06-30 PROCEDURE — 99291 CRITICAL CARE FIRST HOUR: CPT | Mod: GC,,, | Performed by: ANESTHESIOLOGY

## 2022-06-30 PROCEDURE — 93312 PR ECHO HEART,TRANSESOPHAGEAL: ICD-10-PCS | Mod: 26,59,, | Performed by: ANESTHESIOLOGY

## 2022-06-30 PROCEDURE — 33259 PR ABLATE/ RECONSTUCT ATRIA, W OTHER PROCED EXTENS W/ BYPASS: ICD-10-PCS | Mod: GC,,, | Performed by: THORACIC SURGERY (CARDIOTHORACIC VASCULAR SURGERY)

## 2022-06-30 PROCEDURE — 36000712 HC OR TIME LEV V 1ST 15 MIN: Performed by: THORACIC SURGERY (CARDIOTHORACIC VASCULAR SURGERY)

## 2022-06-30 PROCEDURE — 84295 ASSAY OF SERUM SODIUM: CPT

## 2022-06-30 PROCEDURE — 33967 PR IABP INSERTION: ICD-10-PCS | Mod: 51,GC,, | Performed by: THORACIC SURGERY (CARDIOTHORACIC VASCULAR SURGERY)

## 2022-06-30 PROCEDURE — D9220A PRA ANESTHESIA: Mod: ,,, | Performed by: ANESTHESIOLOGY

## 2022-06-30 PROCEDURE — 76937 US GUIDE VASCULAR ACCESS: CPT | Mod: 26,,, | Performed by: ANESTHESIOLOGY

## 2022-06-30 PROCEDURE — 27201423 OPTIME MED/SURG SUP & DEVICES STERILE SUPPLY: Performed by: THORACIC SURGERY (CARDIOTHORACIC VASCULAR SURGERY)

## 2022-06-30 PROCEDURE — 82803 BLOOD GASES ANY COMBINATION: CPT

## 2022-06-30 PROCEDURE — 25000003 PHARM REV CODE 250: Performed by: THORACIC SURGERY (CARDIOTHORACIC VASCULAR SURGERY)

## 2022-06-30 PROCEDURE — 94002 VENT MGMT INPAT INIT DAY: CPT

## 2022-06-30 PROCEDURE — 36620 INSERTION CATHETER ARTERY: CPT | Mod: 59,,, | Performed by: ANESTHESIOLOGY

## 2022-06-30 PROCEDURE — 63600175 PHARM REV CODE 636 W HCPCS: Performed by: THORACIC SURGERY (CARDIOTHORACIC VASCULAR SURGERY)

## 2022-06-30 PROCEDURE — 37000009 HC ANESTHESIA EA ADD 15 MINS: Performed by: THORACIC SURGERY (CARDIOTHORACIC VASCULAR SURGERY)

## 2022-06-30 PROCEDURE — 93325 PR DOPPLER COLOR FLOW VELOCITY MAP: ICD-10-PCS | Mod: 26,,, | Performed by: ANESTHESIOLOGY

## 2022-06-30 PROCEDURE — 83605 ASSAY OF LACTIC ACID: CPT

## 2022-06-30 PROCEDURE — D9220A PRA ANESTHESIA: ICD-10-PCS | Mod: ,,, | Performed by: ANESTHESIOLOGY

## 2022-06-30 PROCEDURE — 27800595 HC HEART VALVES

## 2022-06-30 PROCEDURE — 93325 DOPPLER ECHO COLOR FLOW MAPG: CPT | Mod: 26,,, | Performed by: ANESTHESIOLOGY

## 2022-06-30 PROCEDURE — 27200923 HC BALLOON CATHETER

## 2022-06-30 PROCEDURE — 93503 SWAN GANZ LINE: ICD-10-PCS | Mod: 59,,, | Performed by: ANESTHESIOLOGY

## 2022-06-30 PROCEDURE — 27000191 HC C-V MONITORING

## 2022-06-30 PROCEDURE — C1729 CATH, DRAINAGE: HCPCS | Performed by: THORACIC SURGERY (CARDIOTHORACIC VASCULAR SURGERY)

## 2022-06-30 PROCEDURE — 80048 BASIC METABOLIC PNL TOTAL CA: CPT | Mod: XB | Performed by: THORACIC SURGERY (CARDIOTHORACIC VASCULAR SURGERY)

## 2022-06-30 PROCEDURE — 85730 THROMBOPLASTIN TIME PARTIAL: CPT | Performed by: STUDENT IN AN ORGANIZED HEALTH CARE EDUCATION/TRAINING PROGRAM

## 2022-06-30 PROCEDURE — 80053 COMPREHEN METABOLIC PANEL: CPT | Performed by: STUDENT IN AN ORGANIZED HEALTH CARE EDUCATION/TRAINING PROGRAM

## 2022-06-30 PROCEDURE — 27000221 HC OXYGEN, UP TO 24 HOURS

## 2022-06-30 PROCEDURE — 85025 COMPLETE CBC W/AUTO DIFF WBC: CPT | Performed by: STUDENT IN AN ORGANIZED HEALTH CARE EDUCATION/TRAINING PROGRAM

## 2022-06-30 PROCEDURE — 85610 PROTHROMBIN TIME: CPT | Performed by: STUDENT IN AN ORGANIZED HEALTH CARE EDUCATION/TRAINING PROGRAM

## 2022-06-30 PROCEDURE — 85384 FIBRINOGEN ACTIVITY: CPT | Performed by: STUDENT IN AN ORGANIZED HEALTH CARE EDUCATION/TRAINING PROGRAM

## 2022-06-30 PROCEDURE — 93320 PR DOPPLER ECHO HEART,COMPLETE: ICD-10-PCS | Mod: 26,,, | Performed by: ANESTHESIOLOGY

## 2022-06-30 PROCEDURE — 27201037 HC PRESSURE MONITORING SET UP

## 2022-06-30 PROCEDURE — 83735 ASSAY OF MAGNESIUM: CPT | Performed by: STUDENT IN AN ORGANIZED HEALTH CARE EDUCATION/TRAINING PROGRAM

## 2022-06-30 PROCEDURE — 36000713 HC OR TIME LEV V EA ADD 15 MIN: Performed by: THORACIC SURGERY (CARDIOTHORACIC VASCULAR SURGERY)

## 2022-06-30 PROCEDURE — 76937 SWAN GANZ LINE: ICD-10-PCS | Mod: 26,,, | Performed by: ANESTHESIOLOGY

## 2022-06-30 PROCEDURE — 20000000 HC ICU ROOM

## 2022-06-30 PROCEDURE — 36620 ARTERIAL: ICD-10-PCS | Mod: 59,,, | Performed by: ANESTHESIOLOGY

## 2022-06-30 PROCEDURE — P9045 ALBUMIN (HUMAN), 5%, 250 ML: HCPCS | Mod: JG | Performed by: STUDENT IN AN ORGANIZED HEALTH CARE EDUCATION/TRAINING PROGRAM

## 2022-06-30 PROCEDURE — 93503 INSERT/PLACE HEART CATHETER: CPT | Mod: 59,,, | Performed by: ANESTHESIOLOGY

## 2022-06-30 PROCEDURE — 99291 PR CRITICAL CARE, E/M 30-74 MINUTES: ICD-10-PCS | Mod: GC,,, | Performed by: ANESTHESIOLOGY

## 2022-06-30 PROCEDURE — 27200953 HC CARDIOPLEGIA SYSTEM

## 2022-06-30 PROCEDURE — 27202608 HC CANNULA, MISC

## 2022-06-30 PROCEDURE — 88304 TISSUE EXAM BY PATHOLOGIST: CPT | Mod: 26,,, | Performed by: PATHOLOGY

## 2022-06-30 PROCEDURE — 83735 ASSAY OF MAGNESIUM: CPT | Mod: 91 | Performed by: THORACIC SURGERY (CARDIOTHORACIC VASCULAR SURGERY)

## 2022-06-30 PROCEDURE — 99900035 HC TECH TIME PER 15 MIN (STAT)

## 2022-06-30 PROCEDURE — 27000175 HC ADULT BYPASS PUMP

## 2022-06-30 PROCEDURE — 85520 HEPARIN ASSAY: CPT

## 2022-06-30 PROCEDURE — 82565 ASSAY OF CREATININE: CPT

## 2022-06-30 PROCEDURE — 27100088 HC CELL SAVER

## 2022-06-30 PROCEDURE — 33460: ICD-10-PCS | Mod: GC,,, | Performed by: THORACIC SURGERY (CARDIOTHORACIC VASCULAR SURGERY)

## 2022-06-30 PROCEDURE — 27100094 HC IABP, SET-UP

## 2022-06-30 PROCEDURE — C2618 PROBE/NEEDLE, CRYO: HCPCS | Performed by: THORACIC SURGERY (CARDIOTHORACIC VASCULAR SURGERY)

## 2022-06-30 PROCEDURE — 27800903 OPTIME MED/SURG SUP & DEVICES OTHER IMPLANTS: Performed by: THORACIC SURGERY (CARDIOTHORACIC VASCULAR SURGERY)

## 2022-06-30 PROCEDURE — 63600175 PHARM REV CODE 636 W HCPCS: Performed by: PHYSICIAN ASSISTANT

## 2022-06-30 PROCEDURE — 88304 PR  SURG PATH,LEVEL III: ICD-10-PCS | Mod: 26,,, | Performed by: PATHOLOGY

## 2022-06-30 PROCEDURE — 27200234 HC IABP BALLOON

## 2022-06-30 PROCEDURE — 93312 ECHO TRANSESOPHAGEAL: CPT | Mod: 26,59,, | Performed by: ANESTHESIOLOGY

## 2022-06-30 PROCEDURE — 99900026 HC AIRWAY MAINTENANCE (STAT)

## 2022-06-30 PROCEDURE — 84100 ASSAY OF PHOSPHORUS: CPT | Performed by: STUDENT IN AN ORGANIZED HEALTH CARE EDUCATION/TRAINING PROGRAM

## 2022-06-30 PROCEDURE — 82330 ASSAY OF CALCIUM: CPT | Performed by: THORACIC SURGERY (CARDIOTHORACIC VASCULAR SURGERY)

## 2022-06-30 PROCEDURE — 25000003 PHARM REV CODE 250: Performed by: PHYSICIAN ASSISTANT

## 2022-06-30 PROCEDURE — 33967 INSERT I-AORT PERCUT DEVICE: CPT | Mod: 51,GC,, | Performed by: THORACIC SURGERY (CARDIOTHORACIC VASCULAR SURGERY)

## 2022-06-30 PROCEDURE — 63600175 PHARM REV CODE 636 W HCPCS

## 2022-06-30 PROCEDURE — 85014 HEMATOCRIT: CPT

## 2022-06-30 PROCEDURE — 36592 COLLECT BLOOD FROM PICC: CPT

## 2022-06-30 PROCEDURE — 33460 REVISION OF TRICUSPID VALVE: CPT | Mod: GC,,, | Performed by: THORACIC SURGERY (CARDIOTHORACIC VASCULAR SURGERY)

## 2022-06-30 PROCEDURE — 82330 ASSAY OF CALCIUM: CPT

## 2022-06-30 PROCEDURE — 84132 ASSAY OF SERUM POTASSIUM: CPT

## 2022-06-30 PROCEDURE — 25000003 PHARM REV CODE 250

## 2022-06-30 PROCEDURE — 27100026 HC SHUNT SENSOR, TERUMO

## 2022-06-30 DEVICE — IMPLANTABLE DEVICE: Type: IMPLANTABLE DEVICE | Site: HEART | Status: FUNCTIONAL

## 2022-06-30 DEVICE — PUTTY HEMASORB BONE RESORBABLE: Type: IMPLANTABLE DEVICE | Site: CHEST | Status: FUNCTIONAL

## 2022-06-30 RX ORDER — SODIUM CHLORIDE 9 MG/ML
INJECTION, SOLUTION INTRAVENOUS CONTINUOUS
Status: DISCONTINUED | OUTPATIENT
Start: 2022-06-30 | End: 2022-06-30

## 2022-06-30 RX ORDER — ALBUMIN HUMAN 50 G/1000ML
25 SOLUTION INTRAVENOUS ONCE
Status: COMPLETED | OUTPATIENT
Start: 2022-06-30 | End: 2022-07-01

## 2022-06-30 RX ORDER — NOREPINEPHRINE BITARTRATE 1 MG/ML
INJECTION, SOLUTION INTRAVENOUS
Status: DISCONTINUED | OUTPATIENT
Start: 2022-06-30 | End: 2022-06-30

## 2022-06-30 RX ORDER — LIDOCAINE HYDROCHLORIDE 20 MG/ML
INJECTION, SOLUTION EPIDURAL; INFILTRATION; INTRACAUDAL; PERINEURAL
Status: DISCONTINUED | OUTPATIENT
Start: 2022-06-30 | End: 2022-06-30

## 2022-06-30 RX ORDER — MUPIROCIN 20 MG/G
1 OINTMENT TOPICAL
Status: COMPLETED | OUTPATIENT
Start: 2022-06-30 | End: 2022-06-30

## 2022-06-30 RX ORDER — KETAMINE HCL IN 0.9 % NACL 50 MG/5 ML
SYRINGE (ML) INTRAVENOUS
Status: DISCONTINUED | OUTPATIENT
Start: 2022-06-30 | End: 2022-06-30

## 2022-06-30 RX ORDER — INDOMETHACIN 25 MG/1
CAPSULE ORAL
Status: COMPLETED
Start: 2022-06-30 | End: 2022-06-30

## 2022-06-30 RX ORDER — LEVOTHYROXINE SODIUM 100 UG/1
100 TABLET ORAL
Status: DISCONTINUED | OUTPATIENT
Start: 2022-07-01 | End: 2022-07-07 | Stop reason: HOSPADM

## 2022-06-30 RX ORDER — ASPIRIN 300 MG/1
300 SUPPOSITORY RECTAL DAILY
Status: DISCONTINUED | OUTPATIENT
Start: 2022-06-30 | End: 2022-07-01

## 2022-06-30 RX ORDER — ASPIRIN 325 MG
325 TABLET ORAL DAILY
Status: DISCONTINUED | OUTPATIENT
Start: 2022-06-30 | End: 2022-06-30

## 2022-06-30 RX ORDER — MAGNESIUM SULFATE HEPTAHYDRATE 40 MG/ML
4 INJECTION, SOLUTION INTRAVENOUS
Status: DISCONTINUED | OUTPATIENT
Start: 2022-06-30 | End: 2022-07-06

## 2022-06-30 RX ORDER — POTASSIUM CHLORIDE 29.8 MG/ML
60 INJECTION INTRAVENOUS ONCE
Status: DISCONTINUED | OUTPATIENT
Start: 2022-06-30 | End: 2022-06-30

## 2022-06-30 RX ORDER — PHENYLEPHRINE HCL IN 0.9% NACL 1 MG/10 ML
SYRINGE (ML) INTRAVENOUS
Status: DISCONTINUED | OUTPATIENT
Start: 2022-06-30 | End: 2022-06-30

## 2022-06-30 RX ORDER — POTASSIUM CHLORIDE 14.9 MG/ML
40 INJECTION INTRAVENOUS
Status: DISCONTINUED | OUTPATIENT
Start: 2022-06-30 | End: 2022-07-01

## 2022-06-30 RX ORDER — MAGNESIUM SULFATE HEPTAHYDRATE 40 MG/ML
2 INJECTION, SOLUTION INTRAVENOUS
Status: DISCONTINUED | OUTPATIENT
Start: 2022-06-30 | End: 2022-07-06

## 2022-06-30 RX ORDER — NITROGLYCERIN 5 MG/ML
INJECTION, SOLUTION INTRAVENOUS
Status: DISCONTINUED | OUTPATIENT
Start: 2022-06-30 | End: 2022-06-30

## 2022-06-30 RX ORDER — CALCIUM GLUCONATE 20 MG/ML
3 INJECTION, SOLUTION INTRAVENOUS
Status: DISCONTINUED | OUTPATIENT
Start: 2022-06-30 | End: 2022-07-06

## 2022-06-30 RX ORDER — ALBUMIN HUMAN 50 G/1000ML
25 SOLUTION INTRAVENOUS ONCE
Status: COMPLETED | OUTPATIENT
Start: 2022-06-30 | End: 2022-06-30

## 2022-06-30 RX ORDER — FAMOTIDINE 10 MG/ML
20 INJECTION INTRAVENOUS 2 TIMES DAILY
Status: DISCONTINUED | OUTPATIENT
Start: 2022-06-30 | End: 2022-07-01

## 2022-06-30 RX ORDER — POTASSIUM CHLORIDE 14.9 MG/ML
20 INJECTION INTRAVENOUS
Status: DISCONTINUED | OUTPATIENT
Start: 2022-06-30 | End: 2022-07-01

## 2022-06-30 RX ORDER — CALCIUM GLUCONATE 20 MG/ML
1 INJECTION, SOLUTION INTRAVENOUS
Status: DISCONTINUED | OUTPATIENT
Start: 2022-06-30 | End: 2022-07-06

## 2022-06-30 RX ORDER — MUPIROCIN 20 MG/G
OINTMENT TOPICAL 2 TIMES DAILY
Status: DISPENSED | OUTPATIENT
Start: 2022-06-30 | End: 2022-07-05

## 2022-06-30 RX ORDER — ETOMIDATE 2 MG/ML
INJECTION INTRAVENOUS
Status: DISCONTINUED | OUTPATIENT
Start: 2022-06-30 | End: 2022-06-30

## 2022-06-30 RX ORDER — SODIUM CHLORIDE 9 MG/ML
INJECTION, SOLUTION INTRAVENOUS CONTINUOUS PRN
Status: DISCONTINUED | OUTPATIENT
Start: 2022-06-30 | End: 2022-06-30

## 2022-06-30 RX ORDER — PROPOFOL 10 MG/ML
VIAL (ML) INTRAVENOUS
Status: DISCONTINUED | OUTPATIENT
Start: 2022-06-30 | End: 2022-06-30

## 2022-06-30 RX ORDER — OXYCODONE HYDROCHLORIDE 5 MG/1
5 TABLET ORAL EVERY 4 HOURS PRN
Status: DISCONTINUED | OUTPATIENT
Start: 2022-06-30 | End: 2022-07-07 | Stop reason: HOSPADM

## 2022-06-30 RX ORDER — ONDANSETRON 2 MG/ML
4 INJECTION INTRAMUSCULAR; INTRAVENOUS EVERY 6 HOURS PRN
Status: DISCONTINUED | OUTPATIENT
Start: 2022-06-30 | End: 2022-07-07 | Stop reason: HOSPADM

## 2022-06-30 RX ORDER — NOREPINEPHRINE BITARTRATE/D5W 4MG/250ML
0-3 PLASTIC BAG, INJECTION (ML) INTRAVENOUS CONTINUOUS
Status: DISCONTINUED | OUTPATIENT
Start: 2022-06-30 | End: 2022-07-02

## 2022-06-30 RX ORDER — PROPOFOL 10 MG/ML
VIAL (ML) INTRAVENOUS CONTINUOUS PRN
Status: DISCONTINUED | OUTPATIENT
Start: 2022-06-30 | End: 2022-06-30

## 2022-06-30 RX ORDER — LIDOCAINE HYDROCHLORIDE 10 MG/ML
1 INJECTION, SOLUTION EPIDURAL; INFILTRATION; INTRACAUDAL; PERINEURAL
Status: COMPLETED | OUTPATIENT
Start: 2022-06-30 | End: 2022-06-30

## 2022-06-30 RX ORDER — TRANEXAMIC ACID 100 MG/ML
INJECTION, SOLUTION INTRAVENOUS
Status: DISCONTINUED | OUTPATIENT
Start: 2022-06-30 | End: 2022-06-30

## 2022-06-30 RX ORDER — POTASSIUM CHLORIDE 29.8 MG/ML
40 INJECTION INTRAVENOUS ONCE
Status: COMPLETED | OUTPATIENT
Start: 2022-06-30 | End: 2022-06-30

## 2022-06-30 RX ORDER — TRANEXAMIC ACID 100 MG/ML
INJECTION, SOLUTION INTRAVENOUS CONTINUOUS PRN
Status: DISCONTINUED | OUTPATIENT
Start: 2022-06-30 | End: 2022-06-30

## 2022-06-30 RX ORDER — ONDANSETRON 2 MG/ML
4 INJECTION INTRAMUSCULAR; INTRAVENOUS EVERY 12 HOURS PRN
Status: DISCONTINUED | OUTPATIENT
Start: 2022-06-30 | End: 2022-06-30

## 2022-06-30 RX ORDER — ALBUMIN HUMAN 50 G/1000ML
25 SOLUTION INTRAVENOUS ONCE
Status: DISCONTINUED | OUTPATIENT
Start: 2022-06-30 | End: 2022-07-02

## 2022-06-30 RX ORDER — HEPARIN SODIUM 1000 [USP'U]/ML
INJECTION, SOLUTION INTRAVENOUS; SUBCUTANEOUS
Status: DISCONTINUED | OUTPATIENT
Start: 2022-06-30 | End: 2022-06-30

## 2022-06-30 RX ORDER — HYDROMORPHONE HYDROCHLORIDE 1 MG/ML
2 INJECTION, SOLUTION INTRAMUSCULAR; INTRAVENOUS; SUBCUTANEOUS EVERY 4 HOURS PRN
Status: DISCONTINUED | OUTPATIENT
Start: 2022-06-30 | End: 2022-06-30

## 2022-06-30 RX ORDER — INDOMETHACIN 25 MG/1
100 CAPSULE ORAL ONCE
Status: COMPLETED | OUTPATIENT
Start: 2022-06-30 | End: 2022-06-30

## 2022-06-30 RX ORDER — SODIUM CHLORIDE 9 MG/ML
INJECTION, SOLUTION INTRAVENOUS CONTINUOUS
Status: DISCONTINUED | OUTPATIENT
Start: 2022-06-30 | End: 2022-07-02

## 2022-06-30 RX ORDER — OXYCODONE HYDROCHLORIDE 10 MG/1
10 TABLET ORAL EVERY 4 HOURS PRN
Status: DISCONTINUED | OUTPATIENT
Start: 2022-06-30 | End: 2022-07-07 | Stop reason: HOSPADM

## 2022-06-30 RX ORDER — ACETAMINOPHEN 650 MG/20.3ML
1000 LIQUID ORAL EVERY 8 HOURS
Status: DISCONTINUED | OUTPATIENT
Start: 2022-06-30 | End: 2022-06-30

## 2022-06-30 RX ORDER — ACETAMINOPHEN 500 MG
1000 TABLET ORAL ONCE
Status: COMPLETED | OUTPATIENT
Start: 2022-06-30 | End: 2022-06-30

## 2022-06-30 RX ORDER — CEFAZOLIN SODIUM/WATER 2 G/20 ML
2 SYRINGE (ML) INTRAVENOUS
Status: COMPLETED | OUTPATIENT
Start: 2022-06-30 | End: 2022-06-30

## 2022-06-30 RX ORDER — ROCURONIUM BROMIDE 10 MG/ML
INJECTION, SOLUTION INTRAVENOUS
Status: DISCONTINUED | OUTPATIENT
Start: 2022-06-30 | End: 2022-06-30

## 2022-06-30 RX ORDER — ACETAMINOPHEN 500 MG
1000 TABLET ORAL EVERY 8 HOURS
Status: DISCONTINUED | OUTPATIENT
Start: 2022-06-30 | End: 2022-07-01

## 2022-06-30 RX ORDER — POTASSIUM CHLORIDE 29.8 MG/ML
40 INJECTION INTRAVENOUS
Status: DISCONTINUED | OUTPATIENT
Start: 2022-06-30 | End: 2022-07-01

## 2022-06-30 RX ORDER — MIDAZOLAM HYDROCHLORIDE 1 MG/ML
INJECTION INTRAMUSCULAR; INTRAVENOUS
Status: DISCONTINUED | OUTPATIENT
Start: 2022-06-30 | End: 2022-06-30

## 2022-06-30 RX ORDER — HYDROMORPHONE HYDROCHLORIDE 1 MG/ML
1 INJECTION, SOLUTION INTRAMUSCULAR; INTRAVENOUS; SUBCUTANEOUS
Status: DISCONTINUED | OUTPATIENT
Start: 2022-06-30 | End: 2022-07-02

## 2022-06-30 RX ORDER — POLYETHYLENE GLYCOL 3350 17 G/17G
17 POWDER, FOR SOLUTION ORAL DAILY
Status: DISCONTINUED | OUTPATIENT
Start: 2022-06-30 | End: 2022-07-07 | Stop reason: HOSPADM

## 2022-06-30 RX ORDER — ONDANSETRON 2 MG/ML
INJECTION INTRAMUSCULAR; INTRAVENOUS
Status: DISCONTINUED | OUTPATIENT
Start: 2022-06-30 | End: 2022-06-30

## 2022-06-30 RX ORDER — ACETAMINOPHEN 10 MG/ML
1000 INJECTION, SOLUTION INTRAVENOUS EVERY 8 HOURS
Status: COMPLETED | OUTPATIENT
Start: 2022-06-30 | End: 2022-07-01

## 2022-06-30 RX ORDER — PROTAMINE SULFATE 10 MG/ML
INJECTION, SOLUTION INTRAVENOUS
Status: DISCONTINUED | OUTPATIENT
Start: 2022-06-30 | End: 2022-06-30

## 2022-06-30 RX ORDER — CALCIUM GLUCONATE 20 MG/ML
2 INJECTION, SOLUTION INTRAVENOUS
Status: DISCONTINUED | OUTPATIENT
Start: 2022-06-30 | End: 2022-07-06

## 2022-06-30 RX ORDER — CEFAZOLIN SODIUM/D5W 2 G/100 ML
2 PLASTIC BAG, INJECTION (ML) INTRAVENOUS
Status: COMPLETED | OUTPATIENT
Start: 2022-06-30 | End: 2022-07-01

## 2022-06-30 RX ORDER — EPINEPHRINE 0.1 MG/ML
INJECTION INTRAVENOUS
Status: DISCONTINUED | OUTPATIENT
Start: 2022-06-30 | End: 2022-06-30

## 2022-06-30 RX ORDER — POTASSIUM CHLORIDE 14.9 MG/ML
20 INJECTION INTRAVENOUS ONCE
Status: DISCONTINUED | OUTPATIENT
Start: 2022-06-30 | End: 2022-07-02

## 2022-06-30 RX ORDER — HYDROMORPHONE HYDROCHLORIDE 1 MG/ML
1 INJECTION, SOLUTION INTRAMUSCULAR; INTRAVENOUS; SUBCUTANEOUS EVERY 4 HOURS PRN
Status: DISCONTINUED | OUTPATIENT
Start: 2022-06-30 | End: 2022-06-30

## 2022-06-30 RX ORDER — METOPROLOL TARTRATE 25 MG/1
25 TABLET, FILM COATED ORAL
Status: COMPLETED | OUTPATIENT
Start: 2022-06-30 | End: 2022-06-30

## 2022-06-30 RX ORDER — VASOPRESSIN 20 [USP'U]/ML
INJECTION, SOLUTION INTRAMUSCULAR; SUBCUTANEOUS
Status: DISCONTINUED | OUTPATIENT
Start: 2022-06-30 | End: 2022-06-30

## 2022-06-30 RX ORDER — PROPOFOL 10 MG/ML
0-50 INJECTION, EMULSION INTRAVENOUS CONTINUOUS
Status: DISCONTINUED | OUTPATIENT
Start: 2022-06-30 | End: 2022-07-02

## 2022-06-30 RX ORDER — FENTANYL CITRATE 50 UG/ML
INJECTION, SOLUTION INTRAMUSCULAR; INTRAVENOUS
Status: DISCONTINUED | OUTPATIENT
Start: 2022-06-30 | End: 2022-06-30

## 2022-06-30 RX ADMIN — LIDOCAINE HYDROCHLORIDE 100 MG: 20 INJECTION, SOLUTION EPIDURAL; INFILTRATION; INTRACAUDAL at 07:06

## 2022-06-30 RX ADMIN — ACETAMINOPHEN 1000 MG: 500 TABLET ORAL at 06:06

## 2022-06-30 RX ADMIN — NOREPINEPHRINE BITARTRATE 32 MCG: 1 INJECTION, SOLUTION, CONCENTRATE INTRAVENOUS at 09:06

## 2022-06-30 RX ADMIN — Medication 100 MCG: at 07:06

## 2022-06-30 RX ADMIN — METHADONE HYDROCHLORIDE 21.78 MG: 10 INJECTION, SOLUTION INTRAMUSCULAR; INTRAVENOUS; SUBCUTANEOUS at 08:06

## 2022-06-30 RX ADMIN — NOREPINEPHRINE BITARTRATE 16 MCG: 1 INJECTION, SOLUTION, CONCENTRATE INTRAVENOUS at 11:06

## 2022-06-30 RX ADMIN — POTASSIUM CHLORIDE 40 MEQ: 400 INJECTION, SOLUTION INTRAVENOUS at 04:06

## 2022-06-30 RX ADMIN — SODIUM CHLORIDE: 0.9 INJECTION, SOLUTION INTRAVENOUS at 06:06

## 2022-06-30 RX ADMIN — HEPARIN SODIUM 30000 UNITS: 1000 INJECTION, SOLUTION INTRAVENOUS; SUBCUTANEOUS at 08:06

## 2022-06-30 RX ADMIN — ROCURONIUM BROMIDE 20 MG: 10 INJECTION INTRAVENOUS at 11:06

## 2022-06-30 RX ADMIN — AMIODARONE HYDROCHLORIDE 1 MG/MIN: 1.8 INJECTION, SOLUTION INTRAVENOUS at 07:06

## 2022-06-30 RX ADMIN — VASOPRESSIN 1 UNITS: 20 INJECTION, SOLUTION INTRAMUSCULAR; SUBCUTANEOUS at 11:06

## 2022-06-30 RX ADMIN — NITROGLYCERIN 50 MCG: 5 INJECTION, SOLUTION INTRAVENOUS at 11:06

## 2022-06-30 RX ADMIN — PROTAMINE SULFATE 230 MG: 10 INJECTION, SOLUTION INTRAVENOUS at 11:06

## 2022-06-30 RX ADMIN — ALBUMIN (HUMAN) 25 G: 12.5 SOLUTION INTRAVENOUS at 04:06

## 2022-06-30 RX ADMIN — DEXTROSE 2 G: 50 INJECTION, SOLUTION INTRAVENOUS at 08:06

## 2022-06-30 RX ADMIN — ETOMIDATE 10 MG: 2 INJECTION INTRAVENOUS at 07:06

## 2022-06-30 RX ADMIN — LIDOCAINE HYDROCHLORIDE 100 MG: 20 INJECTION, SOLUTION EPIDURAL; INFILTRATION; INTRACAUDAL at 12:06

## 2022-06-30 RX ADMIN — VASOPRESSIN 2 UNITS: 20 INJECTION, SOLUTION INTRAMUSCULAR; SUBCUTANEOUS at 12:06

## 2022-06-30 RX ADMIN — MIDAZOLAM 2 MG: 1 INJECTION INTRAMUSCULAR; INTRAVENOUS at 07:06

## 2022-06-30 RX ADMIN — ONDANSETRON 1 MG: 2 INJECTION INTRAMUSCULAR; INTRAVENOUS at 11:06

## 2022-06-30 RX ADMIN — AMIODARONE HYDROCHLORIDE 150 MG: 1.5 INJECTION, SOLUTION INTRAVENOUS at 07:06

## 2022-06-30 RX ADMIN — PROPOFOL 100 MG: 10 INJECTION, EMULSION INTRAVENOUS at 07:06

## 2022-06-30 RX ADMIN — EPINEPHRINE 20 MCG: 0.1 INJECTION, SOLUTION ENDOTRACHEAL; INTRACARDIAC; INTRAVENOUS at 11:06

## 2022-06-30 RX ADMIN — NITROGLYCERIN 50 MCG: 5 INJECTION, SOLUTION INTRAVENOUS at 08:06

## 2022-06-30 RX ADMIN — EPINEPHRINE 0.09 MCG/KG/MIN: 1 INJECTION INTRAMUSCULAR; INTRAVENOUS; SUBCUTANEOUS at 05:06

## 2022-06-30 RX ADMIN — SODIUM CHLORIDE 1 MG/KG/HR: 9 INJECTION, SOLUTION INTRAVENOUS at 07:06

## 2022-06-30 RX ADMIN — NOREPINEPHRINE BITARTRATE 0.02 MCG/KG/MIN: 1 INJECTION, SOLUTION, CONCENTRATE INTRAVENOUS at 07:06

## 2022-06-30 RX ADMIN — FENTANYL CITRATE 100 MCG: 50 INJECTION INTRAMUSCULAR; INTRAVENOUS at 07:06

## 2022-06-30 RX ADMIN — SODIUM CHLORIDE: 9 INJECTION, SOLUTION INTRAVENOUS at 05:06

## 2022-06-30 RX ADMIN — SODIUM CHLORIDE: 9 INJECTION, SOLUTION INTRAVENOUS at 07:06

## 2022-06-30 RX ADMIN — Medication 2 G: at 08:06

## 2022-06-30 RX ADMIN — ROCURONIUM BROMIDE 80 MG: 10 INJECTION INTRAVENOUS at 07:06

## 2022-06-30 RX ADMIN — SODIUM CHLORIDE, SODIUM GLUCONATE, SODIUM ACETATE, POTASSIUM CHLORIDE, MAGNESIUM CHLORIDE, SODIUM PHOSPHATE, DIBASIC, AND POTASSIUM PHOSPHATE: .53; .5; .37; .037; .03; .012; .00082 INJECTION, SOLUTION INTRAVENOUS at 07:06

## 2022-06-30 RX ADMIN — EPINEPHRINE 0.08 MCG/KG/MIN: 1 INJECTION INTRAMUSCULAR; INTRAVENOUS; SUBCUTANEOUS at 11:06

## 2022-06-30 RX ADMIN — POTASSIUM CHLORIDE 40 MEQ: 400 INJECTION, SOLUTION INTRAVENOUS at 02:06

## 2022-06-30 RX ADMIN — SODIUM BICARBONATE 100 MEQ: 84 INJECTION, SOLUTION INTRAVENOUS at 04:06

## 2022-06-30 RX ADMIN — ALBUMIN (HUMAN) 25 G: 12.5 SOLUTION INTRAVENOUS at 02:06

## 2022-06-30 RX ADMIN — METOPROLOL TARTRATE 25 MG: 25 TABLET, FILM COATED ORAL at 06:06

## 2022-06-30 RX ADMIN — MUPIROCIN 1 G: 20 OINTMENT TOPICAL at 06:06

## 2022-06-30 RX ADMIN — MUPIROCIN: 20 OINTMENT TOPICAL at 08:06

## 2022-06-30 RX ADMIN — ONDANSETRON 500 MG: 2 INJECTION INTRAMUSCULAR; INTRAVENOUS at 12:06

## 2022-06-30 RX ADMIN — SODIUM BICARBONATE 100 MEQ: 84 INJECTION, SOLUTION INTRAVENOUS at 11:06

## 2022-06-30 RX ADMIN — VASOPRESSIN 2 UNITS: 20 INJECTION, SOLUTION INTRAMUSCULAR; SUBCUTANEOUS at 11:06

## 2022-06-30 RX ADMIN — Medication 2 G: at 12:06

## 2022-06-30 RX ADMIN — PROPOFOL 30 MCG/KG/MIN: 10 INJECTION, EMULSION INTRAVENOUS at 05:06

## 2022-06-30 RX ADMIN — LIDOCAINE HYDROCHLORIDE 10 MG: 10 INJECTION, SOLUTION EPIDURAL; INFILTRATION; INTRACAUDAL at 06:06

## 2022-06-30 RX ADMIN — SODIUM BICARBONATE 100 MEQ: 84 INJECTION, SOLUTION INTRAVENOUS at 02:06

## 2022-06-30 RX ADMIN — FAMOTIDINE 20 MG: 10 INJECTION, SOLUTION INTRAVENOUS at 08:06

## 2022-06-30 RX ADMIN — Medication 30 MG: at 08:06

## 2022-06-30 RX ADMIN — INDOMETHACIN 100 MEQ: 25 CAPSULE ORAL at 11:06

## 2022-06-30 RX ADMIN — MAGNESIUM SULFATE HEPTAHYDRATE 2 G: 40 INJECTION, SOLUTION INTRAVENOUS at 05:06

## 2022-06-30 RX ADMIN — TRANEXAMIC ACID 1000 MG: 100 INJECTION, SOLUTION INTRAVENOUS at 08:06

## 2022-06-30 RX ADMIN — ALBUMIN (HUMAN) 25 G: 12.5 SOLUTION INTRAVENOUS at 11:06

## 2022-06-30 RX ADMIN — PROPOFOL 25 MCG/KG/MIN: 10 INJECTION, EMULSION INTRAVENOUS at 11:06

## 2022-06-30 RX ADMIN — ACETAMINOPHEN 1000 MG: 10 INJECTION INTRAVENOUS at 10:06

## 2022-06-30 RX ADMIN — PROPOFOL 30 MG: 10 INJECTION, EMULSION INTRAVENOUS at 08:06

## 2022-06-30 RX ADMIN — Medication 20 MG: at 12:06

## 2022-06-30 RX ADMIN — ASPIRIN 300 MG: 300 SUPPOSITORY RECTAL at 07:06

## 2022-06-30 NOTE — PLAN OF CARE
DEVSW attempted to complete the discharge planning assessment but the patient was not able to patrticipate at this time, so DEVSW called his spouse, Ginger, at [phone]. There was no answer, so I called the second phone number listed, [phone]. A woman answered and advised that the patient's wife, Ginger, is there but that she is taking a nap and to call her back later at around 3:30 p.m. The woman who answered the phone stated that she is Ginger's neighbor.

## 2022-06-30 NOTE — SUBJECTIVE & OBJECTIVE
Follow-up For: Procedure(s) (LRB):  Valvuloplasty, Mitral (N/A)  REPLACEMENT, MITRAL VALVE (N/A)  MAZE (N/A)    Post-Operative Day: Day of Surgery     Past Medical History:   Diagnosis Date    A-fib     Digestive disorder     reflux    Mitral regurgitation 5/31/2022    Sleep apnea     No CPAP    Thyroid disease        Past Surgical History:   Procedure Laterality Date    CARDIAC SURGERY      AICD    EYE SURGERY Left 04/05/2012    Cataract     EYE SURGERY Left 02/14/2013    YAG Laser    EYE SURGERY Bilateral 01/14/2011    Lasik     HERNIA REPAIR  01/01/1967    LEFT HEART CATHETERIZATION Left 6/2/2022    Procedure: CATHETERIZATION, HEART, LEFT;  Surgeon: Nikhil Lechuga III, MD;  Location: [facility];  Service: Cardiology;  Laterality: Left;    PROSTATE SURGERY  01/01/1999    THYROID LOBECTOMY  09/11/2006       Review of patient's allergies indicates:   Allergen Reactions    Ace inhibitors      Other reaction(s): cough    Arb-angiotensin receptor antagonist      Other reaction(s): cough       Family History       Family history is unknown by patient.          Tobacco Use    Smoking status: Former Smoker    Smokeless tobacco: Never Used   Substance and Sexual Activity    Alcohol use: No    Drug use: No    Sexual activity: Yes     Partners: Female      Review of Systems   Unable to perform ROS: Acuity of condition   Objective:     Vital Signs (Most Recent):  Temp: 98 °F (36.7 °C) (06/30/22 0622)  Pulse: 82 (06/30/22 0536)  Resp: 20 (06/30/22 0536)  BP: (!) 141/84 (06/30/22 0536)  SpO2: 98 % (06/30/22 0536)   Vital Signs (24h Range):  Temp:  [98 °F (36.7 °C)] 98 °F (36.7 °C)  Pulse:  [82] 82  Resp:  [16-20] 16  SpO2:  [98 %] 98 %  BP: (141)/(84) 141/84     Weight: 108.9 kg (240 lb)  Body mass index is 31.66 kg/m².      Intake/Output Summary (Last 24 hours) at 6/30/2022 0904  Last data filed at 6/30/2022 0857  Gross per 24 hour   Intake 10.89 ml   Output --   Net 10.89 ml       Physical Exam  Vitals and nursing note  reviewed.   Constitutional:       Comments: Intubated and sedated   HENT:      Head: Normocephalic.      Mouth/Throat:      Comments: ETT and OG tube in place  Eyes:      General: No scleral icterus.     Conjunctiva/sclera: Conjunctivae normal.   Neck:      Comments: [facility] CVC  Cardiovascular:      Rate and Rhythm: Normal rate.      Comments: Midline sternotomy incision with clean, dry dressing in place  Ventricular pacing wires  (2) mediastinal and (1) pleural chest tube to suction  Pulmonary:      Effort: No respiratory distress.      Comments: Mechanically ventilated  Abdominal:      General: Abdomen is flat. There is no distension.      Palpations: Abdomen is soft.   Genitourinary:     Comments: [place]  Musculoskeletal:         General: No swelling. Normal range of motion.      Comments: R femoral IABP   Skin:     General: Skin is warm.      Capillary Refill: Capillary refill takes less than 2 seconds.      Coloration: Skin is not pale.       Vents:  Vent Mode: A/C (06/30/22 1326)  Ventilator Initiated: Yes (06/30/22 1326)  Set Rate: 22 BPM (06/30/22 1326)  Vt Set: 480 mL (06/30/22 1326)  PEEP/CPAP: 5 cmH20 (06/30/22 1326)  Oxygen Concentration (%): 60 (06/30/22 1326)  Peak Airway Pressure: 32 cmH2O (06/30/22 1326)  Plateau Pressure: 0 cmH20 (06/30/22 1326)  Total Ve: 10.9 mL (06/30/22 1326)  Negative Inspiratory Force (cm H2O): 0 (06/30/22 1326)  F/VT Ratio<105 (RSBI): (!) 29.94 (06/30/22 1326)    Lines/Drains/Airways       Central Venous Catheter Line  Duration             Pulmonary Artery Catheter Assessment  06/30/22 0725 <1 day              Drain  Duration                  Urethral Catheter 06/30/22 0723 Straight-tip;Temperature probe;Non-latex 14 Fr. <1 day              Airway  Duration                  Airway - Non-Surgical 06/30/22 0722 <1 day              Arterial Line  Duration             Arterial Line 06/30/22 0710 Left Radial <1 day              Peripheral Intravenous Line  Duration                   Peripheral IV - Single Lumen 14 G  Right Forearm -- days         Peripheral IV - Single Lumen 06/30/22 0622 18 G Distal;Left;Posterior Forearm <1 day                    Significant Labs:    CBC/Anemia Profile:  Recent Labs   Lab 06/28/22  1319   WBC 6.93   HGB 14.6   HCT 47.4      MCV 80*   RDW 17.0*        Chemistries:  Recent Labs   Lab 06/28/22  1319      K 4.1      CO2 27   BUN 17   CREATININE 1.2   CALCIUM 9.7   ALBUMIN 4.0   PROT 6.8   BILITOT 1.1*   ALKPHOS 66   ALT 23   AST 23       CMP: No results for input(s): NA, K, CL, CO2, GLU, BUN, CREATININE, CALCIUM, PROT, ALBUMIN, BILITOT, ALKPHOS, AST, ALT, ANIONGAP, EGFRNONAA in the last 48 hours.    Invalid input(s): ESTGFAFRICA  Coagulation:   Recent Labs   Lab 06/30/22  1314   INR 1.3*   APTT 36.3*       Significant Imaging: I have reviewed all pertinent imaging results/findings within the past 24 hours.

## 2022-06-30 NOTE — TRANSFER OF CARE
"Anesthesia Transfer of Care Note    Patient: Artemio Ogden    Procedure(s) Performed: Procedure(s) (LRB):  Valvuloplasty, Mitral (N/A)  MAZE (N/A)  INSERTION, INTRA-AORTIC BALLOON PUMP (Right)    Patient location: ICU    Anesthesia Type: general    Transport from OR: Upon arrival to PACU/ICU, patient attached to ventilator and auscultated to confirm bilateral breath sounds and adequate TV. Continuous ECG monitoring in transport. Continuous SpO2 monitoring in transport. Transported from OR intubated on 100% O2 by AMBU with adequate controlled ventilation. Continuos invasive BP monitoring in transport    Post pain: adequate analgesia    Post assessment: no apparent anesthetic complications and tolerated procedure well    Post vital signs: stable    Level of consciousness: sedated    Nausea/Vomiting: no nausea/vomiting    Complications: none    Transfer of care protocol was followed      Last vitals:   Visit Vitals  BP (!) 141/84 (BP Location: Left arm, Patient Position: Lying)   Pulse 82   Temp 36.7 °C (98 °F)   Resp 20   Ht 6' 1" (1.854 m)   Wt 108.9 kg (240 lb)   SpO2 98%   BMI 31.66 kg/m²     "

## 2022-06-30 NOTE — PROGRESS NOTES
Autotransfusion/Rapid Infusion Record:      06/30/2022  Autotransfusionist:  Ahmet Brown    Surgeon(s) and Role:     * Kurtis Machado MD - Primary     * Lenny Blackmon MD - Fellow  Anesthesiologist:  No responsible provider has been recorded for the case.    Past Medical History:   Diagnosis Date    A-fib     Digestive disorder     reflux    Mitral regurgitation 5/31/2022    Sleep apnea     No CPAP    Thyroid disease        Procedure(s) (LRB):  Valvuloplasty, Mitral (N/A)  MAZE (N/A)  INSERTION, INTRA-AORTIC BALLOON PUMP (Right)     1:52 PM    Equipment:    Cell Saver     R.I.S.  : [employer] Model: CATSmart or CATSplus : Nextt   Model: [id number]     Serial number: [id number]   Serial number:    Disposable lot #: [id number]   Disposable lot #:      Were extra cardiotomies used for cell saver?  no   if yes, #:      Solutions:  Anticoagulant: ACD-A   Expiration date: 10/23 Volume used: 900   Wash solution: 0.9% NaCl   Expiration date: 4/25 Volume used: 3600     Cell saver checklist  Time completed:           [x]   Circuit assembled correctly     [x]   Cell saver powered and operational     [x]   Vacuum connected, functional, adjust to max -150mmHg     [x]   Anticoagulant drip rate adjusted     [x]   Transfer bag properly labeled with patient name, expiration time, volume,       anticoagulant, OR number, and initials     [x]   Cell saver disinfected after use (completed at end of case)       Cell Saver volumes:    Total volume processed:     2900 mL     Total volume pRBCs recovered     1400 mL     Volume pRBCs infused     1400 mL         RIS checklist   Time completed:  []   RIS circuit assembled correctly     []   RIS power and operational     []   RIS disinfected after use (completed at end of case)       RIS volumes:    Total volume infused:    (see anesthesia record for blood       product information)    mL       Additional comments:

## 2022-06-30 NOTE — ANESTHESIA PROCEDURE NOTES
Arterial    Diagnosis: Mitral regurgitation    Patient location during procedure: done in OR  Procedure start time: 6/30/2022 7:10 AM  Timeout: 6/30/2022 7:10 AM  Procedure end time: 6/30/2022 7:15 AM    Staffing  Authorizing Provider: DELMY Parrish MD  Performing Provider: Lopez Thakur MD    Anesthesiologist was present at the time of the procedure.    Preanesthetic Checklist  Completed: patient identified, IV checked, site marked, risks and benefits discussed, surgical consent, monitors and equipment checked, pre-op evaluation, timeout performed and anesthesia consent givenArterial  Skin Prep: chlorhexidine gluconate  Local Infiltration: lidocaine  Orientation: left  Location: radial    Catheter Size: 20 G  Catheter placement by Ultrasound guidance. Heme positive aspiration all ports.   Vessel Caliber: medium, patent, compressibility normal  Vascular Doppler:  not done  Needle advanced into vessel with real time Ultrasound guidance.Insertion Attempts: 1  Assessment  Dressing: secured with tape and tegaderm  Patient: Tolerated well

## 2022-06-30 NOTE — CARE UPDATE
Initial Device Settings are listed in the picture above.    Called to reprogram Medtronic CRT-D prior to patient's MAZE procedure with mitral valve repair.     Turned tachytherapies to off and changed DDDR 60 to DOO 80. CRT Adaptive changed to nonadaptive.      Please call Device [facility] when patient gets out of procedure for re-programming of his device.    Juanjo Parker MD  [facility] PGY-4

## 2022-06-30 NOTE — ANESTHESIA PROCEDURE NOTES
Switchback Selene Line    Diagnosis: Mitral regurgitation  Patient location during procedure: done in OR  Procedure start time: 6/30/2022 7:25 AM  Timeout: 6/30/2022 7:25 AM  Procedure end time: 6/30/2022 7:45 AM    Staffing  Authorizing Provider: DELMY Parrish MD  Performing Provider: Lopez Thakur MD    Anesthesiologist was present at the time of the procedure.  Preanesthetic Checklist  Completed: patient identified, IV checked, site marked, risks and benefits discussed, surgical consent, monitors and equipment checked, pre-op evaluation, timeout performed and anesthesia consent given  Switchback Selene Line  Skin Prep: chlorhexidine gluconate  Local Infiltration: none  Location: right,  internal jugular vein  Vessel Caliber: medium, patent  Vascular Doppler:  not done  Introducer: 14 Fr double lumen, manometry used.  Device: CCO/Oximetric Catheter   Catheter Size: 9 Fr  Catheter placement by yes. Heme positive aspiration all ports. PAC floated with balloon up not wedgedSterile sheath usedInsertion Attempts: 1  Indication: intravenous therapy, hemodynamic monitoring  Ultrasound Guidance  Needle advanced into vessel with real time Ultrasound guidance.  Image recorded and saved.  Sterile sheath used.  Assessment  Central Line Bundle Protocol followed. Hand hygiene before procedure, surgical cap worn, surgical mask worn, sterile surgical gloves worn, large sterile drape used.  Verification: blood return, pulsatile blood flow and ultrasound  Dressing: secured with tape and tegaderm and sutured in place and taped  Patient: Tolerated Well

## 2022-06-30 NOTE — PLAN OF CARE
Chart reviewed. Pre-op nursing care and surgery checklist complete. Awaiting consents. EP at bedside for device check. Paced rhythm on monitor. Extension form sent to blood bank. Daughter at bedside. Patient belongings given to family.

## 2022-06-30 NOTE — HPI
Artemio Ogden is a 72 y.o. male who presents for pre-op MVR. He has a medical history significant for afib, mitral regurgitation, GERD, MARCOS, and tyroid disease.   He reports that he has known about his mitral valve for many year but recently found out that he has advanced from moderate to severe regurgitation. He reports a history of atrial fibrillation.  His most recent echo reveals an ejection fraction is 35%.  He reports orthopnea, dyspnea on exertion, fatigue, and palpations.     Mr. Ogden presented to the hospital on 6/30/22 for elective Mitral Valve Repair and MAZE procedure. The case proceeded without difficulty, but due to reduced cardiac function immediately following decannulation, an intra-aortic balloon pump was placed to provide additional support. Intra-operatively, the patient received 1.5L crystalloid, 1400cc cell saver, and no other blood product. He had 250cc UOP intra-operatively. Post-operative RUCHI demonstrated a moderately reduced LVeF, but no major valvular abnormalities. He arrives to the unit intubated, sedated on 7/14/2022 of epi, and .04 of levo. IABP in place at a 1:1 ratio. Chest tubes in place with appropriate sanguinous output.

## 2022-06-30 NOTE — ANESTHESIA PROCEDURE NOTES
Intraoperative RUCHI    Diagnosis: Mitral Regurgitation  Patient location during procedure: OR  Procedure start time: 6/30/2022 8:26 AM  Timeout: 6/30/2022 8:24 AM  Procedure end time: 6/30/2022 9:24 AM  Exam type: Baseline    Staffing  Performed: anesthesiologist     Anesthesiologist: DELMY Parrish MD        Anesthesiologist Present  Yes      Setup & Induction  Patient preparation: bite block inserted  Probe Insertion: easy  Exam: complete  Probe Number: [id number] Echo: 2D, 3D, continuous wave Doppler, pulse wave Doppler and color flow mapping.  Exam     Left Heart  Left Atruim: dilated and severly enlarged (men >5.2; women >4.7) and 6.1  Left appendage velocity:poor ECG quality    Left Ventricle: 7.99 cm, severely abnormal (men >/= 6.8; women>/= 6.1)  LV Wall Thickness (posterior wall):1.01 cm, mildly abnormal (men 1.1-1.3 cm; women 1.0-1.2 cm)    LVAD:no  Estimated Ejection Fraction: < 25% severe  Regional Wall Abnormalities: RWMA present        Left Ventricle Diastolic Function  E Velocity: 125 cm/s  A Velocity: 28.8 cm/s  Deceleration time: 127 ms  E/A Ratio: 4.34    Right Heart  Right Ventricle: normal  Right Ventricle Function: mildly decreased    Intra Atrial Septum  PFO: no shunt by color flow doppler    no lipomatous hypertrophy  no Atrial Septal Defect (Asd)    Right Ventricle  Size: normal, Free Wall Thickness: normal    Aortic Valve:  Stenosis: none.  Morphology: trileaflet    Regurgitation: no aortic valve regurgitation      Mitral Valve:   Morphology:normal and calcified  Prolapse: none  Flail: no flail  Jet Description: moderate-to-severeStenosis: no significant stenosis.    Tricuspid Valve:  Morphology: normal  Regurgitation: mild    Pulmonic Valve:  Morphology:normal  Regurgitation(color flow): none    Great Vessels  Ascending Aorta Diameter: 3.3 cm   Ascending Aorta Atherosclerosis: 2=mild dz (<3mm)  Aortic Arch Atherosclerosis: 2=mild dz (<3mm)  IABP: no  Descending Aorta Atherosclerosis:  2=mild dz (<3mm)  Aorta    Descending aorta IABP: no    Effusions none    SummaryFindings discussed with surgeon.    Other Findings   Dilated LV   Severely depressed LV function, EF<25%   Severe MR, central jet   No isolated MV scallop abnormalities   Mild TR   Mildly depressed RV function.   No PI     Off Pump RUCHI:   -severely depressed LV function EF <25%   -trace MR jet ,  mean gradient 3mmHg   -mild TR   -normal RV function on inotropes

## 2022-06-30 NOTE — ANESTHESIA PROCEDURE NOTES
Intubation    Date/Time: 6/30/2022 7:22 AM  Performed by: Lopez Thakur MD  Authorized by: Lopez Thakur MD     Intubation:     Induction:  Intravenous    Intubated:  Postinduction    Mask Ventilation:  Easy with oral airway    Attempts:  1    Attempted By:  Resident anesthesiologist    Method of Intubation:  Video laryngoscopy    Blade:  Javier 3    Laryngeal View Grade: Grade I - full view of cords      Difficult Airway Encountered?: No      Complications:  None    Airway Device:  Oral endotracheal tube    Airway Device Size:  7.5    Style/Cuff Inflation:  Cuffed (inflated to minimal occlusive pressure)    Tube secured:  24    Secured at:  The lips    Placement Verified By:  Capnometry    Complicating Factors:  None    Findings Post-Intubation:  BS equal bilateral and atraumatic/condition of teeth unchanged

## 2022-06-30 NOTE — ASSESSMENT & PLAN NOTE
Mr. Ogden is a 72 year old male with a hx of spinal stenosis, bilateral carotid artery stenosis, paroxysmal Afib, and severe mitral regurgitation. Now s/p Mitral valve repair, MAZE, Left Atrial Appendage Resection, and placement of intra-aortic balloon pump on 6/30/22.      Neuro/Psych:   -- Sedation: Propofol. Wean as tolerated  -- Pain: PRN Oxy + Dilaudid  -- Scheduled Tylenol             Cards:   -- S/P Mitral Valve Repair, MAZE, Left Atrial Appendage Resection on 6/30/22  -- IABP at 1:1 ratio  -- Requiring moderate-high dose epinephrine for ionotropy. Would like to wean down to .06 if tolerable. Ok to wean Levo to off.  -- Ventricular pacing wires. Backup paced  -- MAP 60-80  -- Cleviprex PRN  -- Aspirin 325mg tonight pending postoperative coags and chest tube output      Pulm:   -- Goal O2 sat > 90%  -- ABG PRN  -- Wean vent as tolerated  -- Plan to remain intubated tonight  -- Mediastinal chest tubes x2 and pleural chest tube x1 to suction  -- Daily CXR      Renal:  -- Keep reyes for strict I/O  -- Trend BUN/Cr       FEN / GI:   -- Replace lytes as needed  -- Nutrition: NPO  -- GI ppx: famotidine  -- Bowel reg: miralax  -- 5% Albumin as needed for postoperative resuscitation      ID:   -- Tm: afebrile; WBC stable  -- Margy-op ancef      Heme/Onc:   -- H/H stable   -- Daily CBC  -- 700mL Cell saver given back  -- Post-op coags pending  -- Aspirin 325mg QD      Endo:   -- BG goal 140-180  -- Insulin gtt      PPx:   Feeding: NPO  Analgesia/Sedation: Propofol / PRN Oxy + Dilaudid  Thromboembolic prevention: SCDs  HOB >30: Yes  Stress Ulcer ppx: famotidine  Glucose control: Critical care goal 140-180 g/dl, ISS     Lines/Drains/Airway: CVC RIJ, Reyes, Chest tubes x 3, ventricular pacing wires, L radial A-line, R Fem IABP      Dispo/Code Status/Palliative:   -- SICU / Full Code.

## 2022-06-30 NOTE — H&P
[facility] - [facility]  Critical Care - Surgery  History & Physical    Patient Name: Artemio Ogden  MRN: [id number]  Admission Date: 6/30/2022  Code Status: Full Code  Attending Physician: Kurtis Machado MD   Primary Care Provider: Danna Lanza MD   Principal Problem: <principal problem not specified>    Subjective:     HPI:  Artemio Ogden is a 72 y.o. male who presents for pre-op MVR. He has a medical history significant for afib, mitral regurgitation, GERD, MARCOS, and tyroid disease.   He reports that he has known about his mitral valve for many year but recently found out that he has advanced from moderate to severe regurgitation. He reports a history of atrial fibrillation.  His most recent echo reveals an ejection fraction is 35%.  He reports orthopnea, dyspnea on exertion, fatigue, and palpations.     Mr. Ogden presented to the hospital on 6/30/22 for elective Mitral Valve Repair and MAZE procedure. The case proceeded without difficulty, but due to reduced cardiac function immediately following decannulation, an intra-aortic balloon pump was placed to provide additional support. Intra-operatively, the patient received 1.5L crystalloid, 1400cc cell saver, and no other blood product. He had 250cc UOP intra-operatively. Post-operative RUCHI demonstrated a moderately reduced LVeF, but no major valvular abnormalities. He arrives to the unit intubated, sedated on 7/14/2022 of epi, and .04 of levo. IABP in place at a 1:1 ratio. Chest tubes in place with appropriate sanguinous output.        Hospital/ICU Course:  No notes on file    Follow-up For: Procedure(s) (LRB):  Valvuloplasty, Mitral (N/A)  REPLACEMENT, MITRAL VALVE (N/A)  MAZE (N/A)    Post-Operative Day: Day of Surgery     Past Medical History:   Diagnosis Date    A-fib     Digestive disorder     reflux    Mitral regurgitation 5/31/2022    Sleep apnea     No CPAP    Thyroid disease        Past Surgical History:   Procedure  Laterality Date    CARDIAC SURGERY      AICD    EYE SURGERY Left 04/05/2012    Cataract     EYE SURGERY Left 02/14/2013    YAG Laser    EYE SURGERY Bilateral 01/14/2011    Lasik     HERNIA REPAIR  01/01/1967    LEFT HEART CATHETERIZATION Left 6/2/2022    Procedure: CATHETERIZATION, HEART, LEFT;  Surgeon: Nikhil Lechuga III, MD;  Location: [facility];  Service: Cardiology;  Laterality: Left;    PROSTATE SURGERY  01/01/1999    THYROID LOBECTOMY  09/11/2006       Review of patient's allergies indicates:   Allergen Reactions    Ace inhibitors      Other reaction(s): cough    Arb-angiotensin receptor antagonist      Other reaction(s): cough       Family History       Family history is unknown by patient.          Tobacco Use    Smoking status: Former Smoker    Smokeless tobacco: Never Used   Substance and Sexual Activity    Alcohol use: No    Drug use: No    Sexual activity: Yes     Partners: Female      Review of Systems   Unable to perform ROS: Acuity of condition   Objective:     Vital Signs (Most Recent):  Temp: 98 °F (36.7 °C) (06/30/22 0622)  Pulse: 82 (06/30/22 0536)  Resp: 20 (06/30/22 0536)  BP: (!) 141/84 (06/30/22 0536)  SpO2: 98 % (06/30/22 0536)   Vital Signs (24h Range):  Temp:  [98 °F (36.7 °C)] 98 °F (36.7 °C)  Pulse:  [82] 82  Resp:  [16-20] 16  SpO2:  [98 %] 98 %  BP: (141)/(84) 141/84     Weight: 108.9 kg (240 lb)  Body mass index is 31.66 kg/m².      Intake/Output Summary (Last 24 hours) at 6/30/2022 0904  Last data filed at 6/30/2022 0857  Gross per 24 hour   Intake 10.89 ml   Output --   Net 10.89 ml       Physical Exam  Vitals and nursing note reviewed.   Constitutional:       Comments: Intubated and sedated   HENT:      Head: Normocephalic.      Mouth/Throat:      Comments: ETT and OG tube in place  Eyes:      General: No scleral icterus.     Conjunctiva/sclera: Conjunctivae normal.   Neck:      Comments: RIJ CVC  Cardiovascular:      Rate and Rhythm: Normal rate.      Comments:  Midline sternotomy incision with clean, dry dressing in place  Ventricular pacing wires  (2) mediastinal and (1) pleural chest tube to suction  Pulmonary:      Effort: No respiratory distress.      Comments: Mechanically ventilated  Abdominal:      General: Abdomen is flat. There is no distension.      Palpations: Abdomen is soft.   Genitourinary:     Comments: Talley  Musculoskeletal:         General: No swelling. Normal range of motion.      Comments: R femoral IABP   Skin:     General: Skin is warm.      Capillary Refill: Capillary refill takes less than 2 seconds.      Coloration: Skin is not pale.       Vents:  Vent Mode: A/C (06/30/22 1326)  Ventilator Initiated: Yes (06/30/22 1326)  Set Rate: 22 BPM (06/30/22 1326)  Vt Set: 480 mL (06/30/22 1326)  PEEP/CPAP: 5 cmH20 (06/30/22 1326)  Oxygen Concentration (%): 60 (06/30/22 1326)  Peak Airway Pressure: 32 cmH2O (06/30/22 1326)  Plateau Pressure: 0 cmH20 (06/30/22 1326)  Total Ve: 10.9 mL (06/30/22 1326)  Negative Inspiratory Force (cm H2O): 0 (06/30/22 1326)  F/VT Ratio<105 (RSBI): (!) 29.94 (06/30/22 1326)    Lines/Drains/Airways       Central Venous Catheter Line  Duration             Pulmonary Artery Catheter Assessment  06/30/22 0725 <1 day              Drain  Duration                  Urethral Catheter 06/30/22 0723 Straight-tip;Temperature probe;Non-latex 14 Fr. <1 day              Airway  Duration                  Airway - Non-Surgical 06/30/22 0722 <1 day              Arterial Line  Duration             Arterial Line 06/30/22 0710 Left Radial <1 day              Peripheral Intravenous Line  Duration                  Peripheral IV - Single Lumen 14 G  Right Forearm -- days         Peripheral IV - Single Lumen 06/30/22 0622 18 G Distal;Left;Posterior Forearm <1 day                    Significant Labs:    CBC/Anemia Profile:  Recent Labs   Lab 06/28/22  1319   WBC 6.93   HGB 14.6   HCT 47.4      MCV 80*   RDW 17.0*        Chemistries:  Recent Labs    Lab 06/28/22  1319      K 4.1      CO2 27   BUN 17   CREATININE 1.2   CALCIUM 9.7   ALBUMIN 4.0   PROT 6.8   BILITOT 1.1*   ALKPHOS 66   ALT 23   AST 23       CMP: No results for input(s): NA, K, CL, CO2, GLU, BUN, CREATININE, CALCIUM, PROT, ALBUMIN, BILITOT, ALKPHOS, AST, ALT, ANIONGAP, EGFRNONAA in the last 48 hours.    Invalid input(s): ESTGFAFRICA  Coagulation:   Recent Labs   Lab 06/30/22  1314   INR 1.3*   APTT 36.3*       Significant Imaging: I have reviewed all pertinent imaging results/findings within the past 24 hours.    Assessment/Plan:     Mitral regurgitation  Mr. Ogden is a 72 year old male with a hx of spinal stenosis, bilateral carotid artery stenosis, paroxysmal Afib, and severe mitral regurgitation. Now s/p Mitral valve repair, MAZE, Left Atrial Appendage Resection, and placement of intra-aortic balloon pump on 6/30/22.      Neuro/Psych:   -- Sedation: Propofol. Wean as tolerated  -- Pain: PRN Oxy + Dilaudid  -- Scheduled Tylenol             Cards:   -- S/P Mitral Valve Repair, MAZE, Left Atrial Appendage Resection on 6/30/22  -- IABP at 1:1 ratio  -- Requiring moderate-high dose epinephrine for ionotropy. Would like to wean down to .06 if tolerable. Ok to wean Levo to off.  -- Ventricular pacing wires. Backup paced  -- MAP 60-80  -- Cleviprex PRN  -- Aspirin 325mg tonight pending postoperative coags and chest tube output      Pulm:   -- Goal O2 sat > 90%  -- ABG PRN  -- Wean vent as tolerated  -- Plan to remain intubated tonight  -- Mediastinal chest tubes x2 and pleural chest tube x1 to suction  -- Daily CXR      Renal:  -- Keep reyes for strict I/O  -- Trend BUN/Cr       FEN / GI:   -- Replace lytes as needed  -- Nutrition: NPO  -- GI ppx: famotidine  -- Bowel reg: miralax  -- 5% Albumin as needed for postoperative resuscitation      ID:   -- Tm: afebrile; WBC stable  -- Margy-op ancef      Heme/Onc:   -- H/H stable   -- Daily CBC  -- 700mL Cell saver given back  --  Post-op coags pending  -- Aspirin 325mg QD      Endo:   -- BG goal 140-180  -- Insulin gtt      PPx:   Feeding: NPO  Analgesia/Sedation: Propofol / PRN Oxy + Dilaudid  Thromboembolic prevention: SCDs  HOB >30: Yes  Stress Ulcer ppx: famotidine  Glucose control: Critical care goal 140-180 g/dl, ISS     Lines/Drains/Airway: CVC RIJ, Talley, Chest tubes x 3, ventricular pacing wires, L radial A-line, R Fem IABP      Dispo/Code Status/Palliative:   -- SICU / Full Code.         Melvin Rose MD  Critical Care - Surgery  [facility] - [facility]

## 2022-07-01 PROBLEM — R73.9 STRESS HYPERGLYCEMIA: Status: ACTIVE | Noted: 2022-07-01

## 2022-07-01 PROBLEM — Z98.890 S/P MITRAL VALVE REPAIR: Status: ACTIVE | Noted: 2022-07-01

## 2022-07-01 LAB
ALBUMIN SERPL BCP-MCNC: 3.3 G/DL (ref 3.5–5.2)
ALBUMIN SERPL BCP-MCNC: 3.4 G/DL (ref 3.5–5.2)
ALLENS TEST: ABNORMAL
ALLENS TEST: NORMAL
ALP SERPL-CCNC: 28 U/L (ref 55–135)
ALP SERPL-CCNC: 29 U/L (ref 55–135)
ALT SERPL W/O P-5'-P-CCNC: 11 U/L (ref 10–44)
ALT SERPL W/O P-5'-P-CCNC: 12 U/L (ref 10–44)
ANION GAP SERPL CALC-SCNC: 13 MMOL/L (ref 8–16)
ANION GAP SERPL CALC-SCNC: 17 MMOL/L (ref 8–16)
APTT BLDCRRT: 25.1 SEC (ref 21–32)
APTT BLDCRRT: NORMAL SEC (ref 21–32)
AST SERPL-CCNC: 59 U/L (ref 10–40)
AST SERPL-CCNC: 63 U/L (ref 10–40)
BASOPHILS # BLD AUTO: 0.02 K/UL (ref 0–0.2)
BASOPHILS NFR BLD: 0.2 % (ref 0–1.9)
BILIRUB SERPL-MCNC: 0.4 MG/DL (ref 0.1–1)
BILIRUB SERPL-MCNC: 0.5 MG/DL (ref 0.1–1)
BLD PROD TYP BPU: NORMAL
BLD PROD TYP BPU: NORMAL
BLOOD UNIT EXPIRATION DATE: NORMAL
BLOOD UNIT EXPIRATION DATE: NORMAL
BLOOD UNIT TYPE CODE: 5100
BLOOD UNIT TYPE CODE: 5100
BLOOD UNIT TYPE: NORMAL
BLOOD UNIT TYPE: NORMAL
BUN SERPL-MCNC: 24 MG/DL (ref 8–23)
BUN SERPL-MCNC: 25 MG/DL (ref 8–23)
CALCIUM SERPL-MCNC: 8.1 MG/DL (ref 8.7–10.5)
CALCIUM SERPL-MCNC: 8.3 MG/DL (ref 8.7–10.5)
CHLORIDE SERPL-SCNC: 109 MMOL/L (ref 95–110)
CHLORIDE SERPL-SCNC: 110 MMOL/L (ref 95–110)
CO2 SERPL-SCNC: 17 MMOL/L (ref 23–29)
CO2 SERPL-SCNC: 22 MMOL/L (ref 23–29)
CODING SYSTEM: NORMAL
CODING SYSTEM: NORMAL
CREAT SERPL-MCNC: 1.9 MG/DL (ref 0.5–1.4)
CREAT SERPL-MCNC: 2.1 MG/DL (ref 0.5–1.4)
DELSYS: ABNORMAL
DIFFERENTIAL METHOD: ABNORMAL
DISPENSE STATUS: NORMAL
DISPENSE STATUS: NORMAL
EOSINOPHIL # BLD AUTO: 0 K/UL (ref 0–0.5)
EOSINOPHIL NFR BLD: 0 % (ref 0–8)
ERYTHROCYTE [DISTWIDTH] IN BLOOD BY AUTOMATED COUNT: 15.7 % (ref 11.5–14.5)
ERYTHROCYTE [SEDIMENTATION RATE] IN BLOOD BY WESTERGREN METHOD: 24 MM/H
EST. GFR  (AFRICAN AMERICAN): 34.8 ML/MIN/1.73 M^2
EST. GFR  (AFRICAN AMERICAN): 39.3 ML/MIN/1.73 M^2
EST. GFR  (NON AFRICAN AMERICAN): 30.1 ML/MIN/1.73 M^2
EST. GFR  (NON AFRICAN AMERICAN): 34 ML/MIN/1.73 M^2
FIO2: 40
FIO2: 50
FIO2: 60
GLUCOSE SERPL-MCNC: 185 MG/DL (ref 70–110)
GLUCOSE SERPL-MCNC: 251 MG/DL (ref 70–110)
HCO3 UR-SCNC: 19.3 MMOL/L (ref 24–28)
HCO3 UR-SCNC: 24.1 MMOL/L (ref 24–28)
HCO3 UR-SCNC: 25.1 MMOL/L (ref 24–28)
HCO3 UR-SCNC: 26 MMOL/L (ref 24–28)
HCO3 UR-SCNC: 26.1 MMOL/L (ref 24–28)
HCO3 UR-SCNC: 26.2 MMOL/L (ref 24–28)
HCO3 UR-SCNC: 26.9 MMOL/L (ref 24–28)
HCT VFR BLD AUTO: 30.3 % (ref 40–54)
HCT VFR BLD CALC: 25 %PCV (ref 36–54)
HCT VFR BLD CALC: 25 %PCV (ref 36–54)
HCT VFR BLD CALC: 26 %PCV (ref 36–54)
HCT VFR BLD CALC: 28 %PCV (ref 36–54)
HCT VFR BLD CALC: 29 %PCV (ref 36–54)
HGB BLD-MCNC: 9.3 G/DL (ref 14–18)
IMM GRANULOCYTES # BLD AUTO: 0.06 K/UL (ref 0–0.04)
IMM GRANULOCYTES NFR BLD AUTO: 0.6 % (ref 0–0.5)
INR PPP: 1.2 (ref 0.8–1.2)
INR PPP: NORMAL (ref 0.8–1.2)
LDH SERPL L TO P-CCNC: 0.89 MMOL/L (ref 0.36–1.25)
LDH SERPL L TO P-CCNC: 1.26 MMOL/L (ref 0.36–1.25)
LDH SERPL L TO P-CCNC: 2.18 MMOL/L (ref 0.36–1.25)
LDH SERPL L TO P-CCNC: 5.07 MMOL/L (ref 0.36–1.25)
LDH SERPL L TO P-CCNC: 9.8 MMOL/L (ref 0.36–1.25)
LYMPHOCYTES # BLD AUTO: 1 K/UL (ref 1–4.8)
LYMPHOCYTES NFR BLD: 9.5 % (ref 18–48)
MAGNESIUM SERPL-MCNC: 2.6 MG/DL (ref 1.6–2.6)
MCH RBC QN AUTO: 25.1 PG (ref 27–31)
MCHC RBC AUTO-ENTMCNC: 30.7 G/DL (ref 32–36)
MCV RBC AUTO: 82 FL (ref 82–98)
MODE: ABNORMAL
MONOCYTES # BLD AUTO: 0.8 K/UL (ref 0.3–1)
MONOCYTES NFR BLD: 7.7 % (ref 4–15)
NEUTROPHILS # BLD AUTO: 8.6 K/UL (ref 1.8–7.7)
NEUTROPHILS NFR BLD: 82 % (ref 38–73)
NRBC BLD-RTO: 0 /100 WBC
PCO2 BLDA: 34.1 MMHG (ref 35–45)
PCO2 BLDA: 34.2 MMHG (ref 35–45)
PCO2 BLDA: 36.9 MMHG (ref 35–45)
PCO2 BLDA: 37.5 MMHG (ref 35–45)
PCO2 BLDA: 41.1 MMHG (ref 35–45)
PCO2 BLDA: 41.5 MMHG (ref 35–45)
PCO2 BLDA: 44.2 MMHG (ref 35–45)
PEEP: 5
PH SMN: 7.32 [PH] (ref 7.35–7.45)
PH SMN: 7.38 [PH] (ref 7.35–7.45)
PH SMN: 7.38 [PH] (ref 7.35–7.45)
PH SMN: 7.42 [PH] (ref 7.35–7.45)
PH SMN: 7.46 [PH] (ref 7.35–7.45)
PH SMN: 7.47 [PH] (ref 7.35–7.45)
PH SMN: 7.49 [PH] (ref 7.35–7.45)
PHOSPHATE SERPL-MCNC: 2 MG/DL (ref 2.7–4.5)
PLATELET # BLD AUTO: 71 K/UL (ref 150–450)
PMV BLD AUTO: 11 FL (ref 9.2–12.9)
PO2 BLDA: 100 MMHG (ref 80–100)
PO2 BLDA: 121 MMHG (ref 80–100)
PO2 BLDA: 138 MMHG (ref 80–100)
PO2 BLDA: 34 MMHG (ref 40–60)
PO2 BLDA: 73 MMHG (ref 80–100)
PO2 BLDA: 81 MMHG (ref 80–100)
PO2 BLDA: 95 MMHG (ref 80–100)
POC BE: -1 MMOL/L
POC BE: -7 MMOL/L
POC BE: 1 MMOL/L
POC BE: 1 MMOL/L
POC BE: 2 MMOL/L
POC BE: 2 MMOL/L
POC BE: 3 MMOL/L
POC IONIZED CALCIUM: 1.11 MMOL/L (ref 1.06–1.42)
POC IONIZED CALCIUM: 1.14 MMOL/L (ref 1.06–1.42)
POC IONIZED CALCIUM: 1.14 MMOL/L (ref 1.06–1.42)
POC IONIZED CALCIUM: 1.18 MMOL/L (ref 1.06–1.42)
POC IONIZED CALCIUM: 1.18 MMOL/L (ref 1.06–1.42)
POC SATURATED O2: 65 % (ref 95–100)
POC SATURATED O2: 96 % (ref 95–100)
POC SATURATED O2: 97 % (ref 95–100)
POC SATURATED O2: 97 % (ref 95–100)
POC SATURATED O2: 98 % (ref 95–100)
POC SATURATED O2: 98 % (ref 95–100)
POC SATURATED O2: 99 % (ref 95–100)
POC TCO2: 20 MMOL/L (ref 23–27)
POC TCO2: 25 MMOL/L (ref 23–27)
POC TCO2: 26 MMOL/L (ref 23–27)
POC TCO2: 27 MMOL/L (ref 23–27)
POC TCO2: 27 MMOL/L (ref 23–27)
POC TCO2: 27 MMOL/L (ref 24–29)
POC TCO2: 28 MMOL/L (ref 23–27)
POCT GLUCOSE: 107 MG/DL (ref 70–110)
POCT GLUCOSE: 116 MG/DL (ref 70–110)
POCT GLUCOSE: 121 MG/DL (ref 70–110)
POCT GLUCOSE: 140 MG/DL (ref 70–110)
POCT GLUCOSE: 148 MG/DL (ref 70–110)
POCT GLUCOSE: 153 MG/DL (ref 70–110)
POCT GLUCOSE: 183 MG/DL (ref 70–110)
POCT GLUCOSE: 201 MG/DL (ref 70–110)
POCT GLUCOSE: 202 MG/DL (ref 70–110)
POCT GLUCOSE: 227 MG/DL (ref 70–110)
POCT GLUCOSE: 227 MG/DL (ref 70–110)
POCT GLUCOSE: 229 MG/DL (ref 70–110)
POCT GLUCOSE: 230 MG/DL (ref 70–110)
POCT GLUCOSE: 238 MG/DL (ref 70–110)
POCT GLUCOSE: 254 MG/DL (ref 70–110)
POCT GLUCOSE: 259 MG/DL (ref 70–110)
POCT GLUCOSE: 76 MG/DL (ref 70–110)
POCT GLUCOSE: 89 MG/DL (ref 70–110)
POCT GLUCOSE: 96 MG/DL (ref 70–110)
POTASSIUM BLD-SCNC: 3.6 MMOL/L (ref 3.5–5.1)
POTASSIUM BLD-SCNC: 3.8 MMOL/L (ref 3.5–5.1)
POTASSIUM BLD-SCNC: 4.7 MMOL/L (ref 3.5–5.1)
POTASSIUM SERPL-SCNC: 3.9 MMOL/L (ref 3.5–5.1)
POTASSIUM SERPL-SCNC: 3.9 MMOL/L (ref 3.5–5.1)
PROT SERPL-MCNC: 4.5 G/DL (ref 6–8.4)
PROT SERPL-MCNC: 4.7 G/DL (ref 6–8.4)
PROTHROMBIN TIME: 12.3 SEC (ref 9–12.5)
PROTHROMBIN TIME: NORMAL SEC (ref 9–12.5)
RBC # BLD AUTO: 3.7 M/UL (ref 4.6–6.2)
SAMPLE: ABNORMAL
SAMPLE: NORMAL
SITE: ABNORMAL
SITE: NORMAL
SODIUM BLD-SCNC: 143 MMOL/L (ref 136–145)
SODIUM BLD-SCNC: 145 MMOL/L (ref 136–145)
SODIUM BLD-SCNC: 146 MMOL/L (ref 136–145)
SODIUM BLD-SCNC: 146 MMOL/L (ref 136–145)
SODIUM BLD-SCNC: 147 MMOL/L (ref 136–145)
SODIUM SERPL-SCNC: 144 MMOL/L (ref 136–145)
SODIUM SERPL-SCNC: 144 MMOL/L (ref 136–145)
SP02: 100
SP02: 96
SP02: 98
TRANS ERYTHROCYTES VOL PATIENT: NORMAL ML
TRANS ERYTHROCYTES VOL PATIENT: NORMAL ML
VT: 480
WBC # BLD AUTO: 10.45 K/UL (ref 3.9–12.7)

## 2022-07-01 PROCEDURE — 99222 PR INITIAL HOSPITAL CARE,LEVL II: ICD-10-PCS | Mod: ,,, | Performed by: NURSE PRACTITIONER

## 2022-07-01 PROCEDURE — 85610 PROTHROMBIN TIME: CPT | Mod: 91 | Performed by: THORACIC SURGERY (CARDIOTHORACIC VASCULAR SURGERY)

## 2022-07-01 PROCEDURE — 37799 UNLISTED PX VASCULAR SURGERY: CPT

## 2022-07-01 PROCEDURE — 25000003 PHARM REV CODE 250

## 2022-07-01 PROCEDURE — 82565 ASSAY OF CREATININE: CPT

## 2022-07-01 PROCEDURE — 83735 ASSAY OF MAGNESIUM: CPT | Performed by: THORACIC SURGERY (CARDIOTHORACIC VASCULAR SURGERY)

## 2022-07-01 PROCEDURE — 99222 1ST HOSP IP/OBS MODERATE 55: CPT | Mod: ,,, | Performed by: NURSE PRACTITIONER

## 2022-07-01 PROCEDURE — 27000202 HC IABP, ADD'L DAY

## 2022-07-01 PROCEDURE — 63600175 PHARM REV CODE 636 W HCPCS: Performed by: STUDENT IN AN ORGANIZED HEALTH CARE EDUCATION/TRAINING PROGRAM

## 2022-07-01 PROCEDURE — 99900035 HC TECH TIME PER 15 MIN (STAT)

## 2022-07-01 PROCEDURE — 85025 COMPLETE CBC W/AUTO DIFF WBC: CPT | Performed by: THORACIC SURGERY (CARDIOTHORACIC VASCULAR SURGERY)

## 2022-07-01 PROCEDURE — 99291 CRITICAL CARE FIRST HOUR: CPT | Mod: GC,,, | Performed by: ANESTHESIOLOGY

## 2022-07-01 PROCEDURE — 20000000 HC ICU ROOM

## 2022-07-01 PROCEDURE — 82330 ASSAY OF CALCIUM: CPT

## 2022-07-01 PROCEDURE — 99900026 HC AIRWAY MAINTENANCE (STAT)

## 2022-07-01 PROCEDURE — 99291 PR CRITICAL CARE, E/M 30-74 MINUTES: ICD-10-PCS | Mod: GC,,, | Performed by: ANESTHESIOLOGY

## 2022-07-01 PROCEDURE — 94003 VENT MGMT INPAT SUBQ DAY: CPT

## 2022-07-01 PROCEDURE — 84295 ASSAY OF SERUM SODIUM: CPT

## 2022-07-01 PROCEDURE — 80053 COMPREHEN METABOLIC PANEL: CPT | Performed by: THORACIC SURGERY (CARDIOTHORACIC VASCULAR SURGERY)

## 2022-07-01 PROCEDURE — 85014 HEMATOCRIT: CPT

## 2022-07-01 PROCEDURE — 25000003 PHARM REV CODE 250: Performed by: STUDENT IN AN ORGANIZED HEALTH CARE EDUCATION/TRAINING PROGRAM

## 2022-07-01 PROCEDURE — P9045 ALBUMIN (HUMAN), 5%, 250 ML: HCPCS | Mod: JG | Performed by: STUDENT IN AN ORGANIZED HEALTH CARE EDUCATION/TRAINING PROGRAM

## 2022-07-01 PROCEDURE — 85730 THROMBOPLASTIN TIME PARTIAL: CPT | Mod: 91 | Performed by: THORACIC SURGERY (CARDIOTHORACIC VASCULAR SURGERY)

## 2022-07-01 PROCEDURE — 94761 N-INVAS EAR/PLS OXIMETRY MLT: CPT

## 2022-07-01 PROCEDURE — 84132 ASSAY OF SERUM POTASSIUM: CPT

## 2022-07-01 PROCEDURE — 82803 BLOOD GASES ANY COMBINATION: CPT

## 2022-07-01 PROCEDURE — 83605 ASSAY OF LACTIC ACID: CPT

## 2022-07-01 PROCEDURE — 27000221 HC OXYGEN, UP TO 24 HOURS

## 2022-07-01 PROCEDURE — 84100 ASSAY OF PHOSPHORUS: CPT | Performed by: THORACIC SURGERY (CARDIOTHORACIC VASCULAR SURGERY)

## 2022-07-01 PROCEDURE — 63600175 PHARM REV CODE 636 W HCPCS: Performed by: PHYSICIAN ASSISTANT

## 2022-07-01 RX ORDER — FENTANYL CITRATE 50 UG/ML
25 INJECTION, SOLUTION INTRAMUSCULAR; INTRAVENOUS
Status: DISCONTINUED | OUTPATIENT
Start: 2022-07-01 | End: 2022-07-02

## 2022-07-01 RX ORDER — FAMOTIDINE 10 MG/ML
20 INJECTION INTRAVENOUS DAILY
Status: DISCONTINUED | OUTPATIENT
Start: 2022-07-02 | End: 2022-07-03

## 2022-07-01 RX ORDER — MAGNESIUM SULFATE HEPTAHYDRATE 40 MG/ML
2 INJECTION, SOLUTION INTRAVENOUS
Status: DISCONTINUED | OUTPATIENT
Start: 2022-07-01 | End: 2022-07-01

## 2022-07-01 RX ORDER — FAMOTIDINE 10 MG/ML
20 INJECTION INTRAVENOUS 2 TIMES DAILY
Status: DISCONTINUED | OUTPATIENT
Start: 2022-07-01 | End: 2022-07-01

## 2022-07-01 RX ORDER — POTASSIUM CHLORIDE 29.8 MG/ML
40 INJECTION INTRAVENOUS
Status: DISCONTINUED | OUTPATIENT
Start: 2022-07-01 | End: 2022-07-06

## 2022-07-01 RX ORDER — FENTANYL CITRATE 50 UG/ML
50 INJECTION, SOLUTION INTRAMUSCULAR; INTRAVENOUS
Status: DISCONTINUED | OUTPATIENT
Start: 2022-07-04 | End: 2022-07-02

## 2022-07-01 RX ORDER — BISACODYL 10 MG
10 SUPPOSITORY, RECTAL RECTAL DAILY PRN
Status: DISCONTINUED | OUTPATIENT
Start: 2022-07-01 | End: 2022-07-02

## 2022-07-01 RX ORDER — ASPIRIN 300 MG/1
300 SUPPOSITORY RECTAL ONCE AS NEEDED
Status: DISCONTINUED | OUTPATIENT
Start: 2022-07-01 | End: 2022-07-02

## 2022-07-01 RX ORDER — ALBUMIN HUMAN 50 G/1000ML
25 SOLUTION INTRAVENOUS ONCE
Status: COMPLETED | OUTPATIENT
Start: 2022-07-01 | End: 2022-07-01

## 2022-07-01 RX ORDER — OXYCODONE HYDROCHLORIDE 5 MG/1
5 TABLET ORAL EVERY 4 HOURS PRN
Status: DISCONTINUED | OUTPATIENT
Start: 2022-07-01 | End: 2022-07-01

## 2022-07-01 RX ORDER — POTASSIUM CHLORIDE 14.9 MG/ML
20 INJECTION INTRAVENOUS
Status: DISCONTINUED | OUTPATIENT
Start: 2022-07-01 | End: 2022-07-06

## 2022-07-01 RX ORDER — SODIUM,POTASSIUM PHOSPHATES 280-250MG
2 POWDER IN PACKET (EA) ORAL ONCE
Status: DISCONTINUED | OUTPATIENT
Start: 2022-07-01 | End: 2022-07-01

## 2022-07-01 RX ORDER — POTASSIUM CHLORIDE 14.9 MG/ML
60 INJECTION INTRAVENOUS
Status: DISCONTINUED | OUTPATIENT
Start: 2022-07-01 | End: 2022-07-06

## 2022-07-01 RX ORDER — METOCLOPRAMIDE HYDROCHLORIDE 5 MG/ML
5 INJECTION INTRAMUSCULAR; INTRAVENOUS EVERY 6 HOURS PRN
Status: DISCONTINUED | OUTPATIENT
Start: 2022-07-01 | End: 2022-07-02

## 2022-07-01 RX ORDER — OXYCODONE HYDROCHLORIDE 10 MG/1
10 TABLET ORAL EVERY 4 HOURS PRN
Status: DISCONTINUED | OUTPATIENT
Start: 2022-07-01 | End: 2022-07-01

## 2022-07-01 RX ORDER — ACETAMINOPHEN 500 MG
1000 TABLET ORAL EVERY 8 HOURS
Status: DISCONTINUED | OUTPATIENT
Start: 2022-07-02 | End: 2022-07-03

## 2022-07-01 RX ORDER — DEXMEDETOMIDINE HYDROCHLORIDE 4 UG/ML
0-1.4 INJECTION, SOLUTION INTRAVENOUS CONTINUOUS
Status: DISCONTINUED | OUTPATIENT
Start: 2022-07-01 | End: 2022-07-02

## 2022-07-01 RX ORDER — MAGNESIUM SULFATE HEPTAHYDRATE 40 MG/ML
4 INJECTION, SOLUTION INTRAVENOUS
Status: DISCONTINUED | OUTPATIENT
Start: 2022-07-01 | End: 2022-07-01

## 2022-07-01 RX ORDER — SODIUM CHLORIDE 0.9 % (FLUSH) 0.9 %
10 SYRINGE (ML) INJECTION
Status: DISCONTINUED | OUTPATIENT
Start: 2022-07-01 | End: 2022-07-02

## 2022-07-01 RX ORDER — MUPIROCIN 20 MG/G
1 OINTMENT TOPICAL 2 TIMES DAILY
Status: DISCONTINUED | OUTPATIENT
Start: 2022-07-01 | End: 2022-07-01

## 2022-07-01 RX ORDER — ONDANSETRON 2 MG/ML
4 INJECTION INTRAMUSCULAR; INTRAVENOUS EVERY 12 HOURS PRN
Status: DISCONTINUED | OUTPATIENT
Start: 2022-07-01 | End: 2022-07-01

## 2022-07-01 RX ORDER — INSULIN ASPART 100 [IU]/ML
1-10 INJECTION, SOLUTION INTRAVENOUS; SUBCUTANEOUS EVERY 4 HOURS PRN
Status: DISCONTINUED | OUTPATIENT
Start: 2022-07-01 | End: 2022-07-02

## 2022-07-01 RX ORDER — GLUCAGON 1 MG
1 KIT INJECTION
Status: DISCONTINUED | OUTPATIENT
Start: 2022-07-01 | End: 2022-07-02

## 2022-07-01 RX ORDER — IPRATROPIUM BROMIDE AND ALBUTEROL SULFATE 2.5; .5 MG/3ML; MG/3ML
3 SOLUTION RESPIRATORY (INHALATION) EVERY 4 HOURS PRN
Status: ACTIVE | OUTPATIENT
Start: 2022-07-01 | End: 2022-07-02

## 2022-07-01 RX ORDER — POLYETHYLENE GLYCOL 3350 17 G/17G
17 POWDER, FOR SOLUTION ORAL DAILY
Status: DISCONTINUED | OUTPATIENT
Start: 2022-07-01 | End: 2022-07-01

## 2022-07-01 RX ADMIN — INSULIN HUMAN 9.5 UNITS/HR: 1 INJECTION, SOLUTION INTRAVENOUS at 04:07

## 2022-07-01 RX ADMIN — FAMOTIDINE 20 MG: 10 INJECTION, SOLUTION INTRAVENOUS at 09:07

## 2022-07-01 RX ADMIN — AMIODARONE HYDROCHLORIDE 0.5 MG/MIN: 1.8 INJECTION, SOLUTION INTRAVENOUS at 01:07

## 2022-07-01 RX ADMIN — POTASSIUM CHLORIDE 20 MEQ: 14.9 INJECTION, SOLUTION INTRAVENOUS at 12:07

## 2022-07-01 RX ADMIN — MUPIROCIN: 20 OINTMENT TOPICAL at 09:07

## 2022-07-01 RX ADMIN — PROPOFOL 30 MCG/KG/MIN: 10 INJECTION, EMULSION INTRAVENOUS at 05:07

## 2022-07-01 RX ADMIN — ACETAMINOPHEN 1000 MG: 10 INJECTION INTRAVENOUS at 02:07

## 2022-07-01 RX ADMIN — HYDROMORPHONE HYDROCHLORIDE 1 MG: 1 INJECTION, SOLUTION INTRAMUSCULAR; INTRAVENOUS; SUBCUTANEOUS at 07:07

## 2022-07-01 RX ADMIN — POTASSIUM PHOSPHATE, MONOBASIC AND POTASSIUM PHOSPHATE, DIBASIC 20 MMOL: 224; 236 INJECTION, SOLUTION, CONCENTRATE INTRAVENOUS at 07:07

## 2022-07-01 RX ADMIN — POTASSIUM CHLORIDE 20 MEQ: 14.9 INJECTION, SOLUTION INTRAVENOUS at 06:07

## 2022-07-01 RX ADMIN — ALBUMIN (HUMAN) 25 G: 12.5 SOLUTION INTRAVENOUS at 05:07

## 2022-07-01 RX ADMIN — VASOPRESSIN 0.04 UNITS/MIN: 20 INJECTION INTRAVENOUS at 01:07

## 2022-07-01 RX ADMIN — ACETAMINOPHEN 1000 MG: 10 INJECTION INTRAVENOUS at 05:07

## 2022-07-01 RX ADMIN — EPINEPHRINE 0.09 MCG/KG/MIN: 1 INJECTION INTRAMUSCULAR; INTRAVENOUS; SUBCUTANEOUS at 01:07

## 2022-07-01 RX ADMIN — DEXMEDETOMIDINE HYDROCHLORIDE 0.2 MCG/KG/HR: 4 INJECTION, SOLUTION INTRAVENOUS at 04:07

## 2022-07-01 RX ADMIN — EPINEPHRINE 0.06 MCG/KG/MIN: 1 INJECTION INTRAMUSCULAR; INTRAVENOUS; SUBCUTANEOUS at 09:07

## 2022-07-01 RX ADMIN — DEXTROSE 2 G: 50 INJECTION, SOLUTION INTRAVENOUS at 11:07

## 2022-07-01 RX ADMIN — NOREPINEPHRINE BITARTRATE 0.06 MCG/KG/MIN: 4 INJECTION, SOLUTION INTRAVENOUS at 12:07

## 2022-07-01 RX ADMIN — CALCIUM GLUCONATE 1 G: 20 INJECTION, SOLUTION INTRAVENOUS at 02:07

## 2022-07-01 RX ADMIN — AMIODARONE HYDROCHLORIDE 1 MG/MIN: 1.8 INJECTION, SOLUTION INTRAVENOUS at 01:07

## 2022-07-01 RX ADMIN — DEXTROSE 2 G: 50 INJECTION, SOLUTION INTRAVENOUS at 04:07

## 2022-07-01 RX ADMIN — PROPOFOL 35 MCG/KG/MIN: 10 INJECTION, EMULSION INTRAVENOUS at 12:07

## 2022-07-01 RX ADMIN — PROPOFOL 50 MCG/KG/MIN: 10 INJECTION, EMULSION INTRAVENOUS at 01:07

## 2022-07-01 NOTE — PLAN OF CARE
"      SICU PLAN OF CARE NOTE    SHIFT EVENTS:  VSS. See previous notes for shift events. K replaced. 1 g Ca given. POC reviewed with patient and family; questions and concerns addressed. See flow sheets for full assessment details. Will continue to monitor patient closely.      Dx: Mitral regurgitation    Vital Signs: BP (!) 141/84 (BP Location: Left arm, Patient Position: Lying)   Pulse 75   Temp 98.5 °F (36.9 °C) (Oral)   Resp (!) 29   Ht 6' 1" (1.854 m)   Wt 108.9 kg (240 lb)   SpO2 96%   BMI 31.66 kg/m²     Neuro: Follows Commands and Moves All Extremities    Respiratory: Ventilator    Cardiac: paced    Diet: NPO    Gtts: Propofol, Insulin, Epinephrine and Amiodarone     Urine Output: Urinary Catheter 405 cc/shift    Drains:     Pleural Chest Tube, total output 180 cc /  shift    Meds Chest Tube, total output 86 cc /  shift    IABP 1:1 ECG triggered    Restraints  Good until 7/1 1800; no signs of injury at this time     Labs: daily, ABG Q3    Accuchecks: Q1    SKIN NOTE:  Skin: No new skin tears or skin breakdown at this time.    Skin precautions maintained including:  Sacrum and heels with foam dressing in place for pressure protection. Frequent weight shift assistance provided Q2 hr to prevent breakdown. Bed plugged in and mattress inflated; Adhesive use limited. Heels elevated off bed. Pressure points protected and positioning supports utilized.  Skin-to-device areas padded. Skin-to-skin areas padded    "

## 2022-07-01 NOTE — BRIEF OP NOTE
[facility] - [facility] Intensive [facility]  Cardiothoracic Surgery  Operative Note    SUMMARY     Date of Procedure: 6/30/2022     Procedure: Procedure(s) (LRB):  1)  Valvuloplasty, Mitral with a 30 mm Medtronic Future complete ring annuloplasty  04909    2)  MAZE with complete left and right atrial lesion sets and resection of left atrial appendage  01375    3)  INSERTION, INTRA-AORTIC BALLOON PUMP (Right)  23847    Surgeon(s) and Role:     * Kurtis Machado MD - Primary     * Lenny Blackmon MD - Fellow    Assisting Surgeon: None    Pre-Operative Diagnosis: Mitral valve insufficiency, unspecified etiology [I34.0]  Paroxysmal atrial fibrillation [I48.0]    Post-Operative Diagnosis: Post-Op Diagnosis Codes:     * Mitral valve insufficiency, unspecified etiology [I34.0]     * Paroxysmal atrial fibrillation [I48.0]    Anesthesia: General    Operative Findings (including complications, if any): Dilated mitral annulus.      Estimated Blood Loss (EBL): 250 mL           Implants:   Implant Name Type Inv. Item Serial No.  Lot No. LRB No. Used Action   RING FUTURE MITRAL CG 30MM - [id number]  RING FUTURE MITRAL CG 30MM [id number] [employer] [facility] [[email or web address]  N/A 1 Implanted   PUTTY HEMASORB HEMOSTAT 4GM - [id number]  PUTTY HEMASORB HEMOSTAT 4GM  [id number]  1 Implanted       Specimens:             Attestation:  I was present and scrubbed for the procedure.

## 2022-07-01 NOTE — SUBJECTIVE & OBJECTIVE
Interval History/Significant Events: No acute events overnight. He received 2 L of fluid. Lactic was downtrending from 12 to 5. Weaning vasopressors. Low UOP.    Follow-up For: Procedure(s) (LRB):  Valvuloplasty, Mitral (N/A)  MAZE (N/A)  INSERTION, INTRA-AORTIC BALLOON PUMP (Right)    Post-Operative Day: 1 Day Post-Op    Objective:     Vital Signs (Most Recent):  Temp: 97.8 °F (36.6 °C) (07/01/22 0715)  Pulse: 66 (07/01/22 0945)  Resp: 20 (07/01/22 0945)  BP: (!) 141/84 (06/30/22 0536)  SpO2: 99 % (07/01/22 0945)   Vital Signs (24h Range):  Temp:  [97.5 °F (36.4 °C)-98.5 °F (36.9 °C)] 97.8 °F (36.6 °C)  Pulse:  [] 66  Resp:  [0-34] 20  SpO2:  [93 %-100 %] 99 %  Arterial Line BP: ()/(41-62) 95/49     Weight: 108.9 kg (240 lb)  Body mass index is 31.66 kg/m².      Intake/Output Summary (Last 24 hours) at 7/1/2022 1002  Last data filed at 7/1/2022 0900  Gross per 24 hour   Intake 6148.76 ml   Output 1776 ml   Net 4372.76 ml       Physical Exam  Vitals and nursing note reviewed.   Constitutional:       Comments: Intubated and sedated   HENT:      Head: Normocephalic.      Mouth/Throat:      Comments: ETT and OG tube in place  Eyes:      General: No scleral icterus.     Conjunctiva/sclera: Conjunctivae normal.   Neck:      Comments: RIJ CVC  Cardiovascular:      Rate and Rhythm: Normal rate.      Comments: Midline sternotomy incision with clean, dry dressing in place  Ventricular pacing wires  (2) mediastinal and (1) pleural chest tube to suction  Pulmonary:      Effort: No respiratory distress.      Comments: Mechanically ventilated  Abdominal:      General: Abdomen is flat. There is no distension.      Palpations: Abdomen is soft.   Genitourinary:     Comments: Talley  Musculoskeletal:         General: No swelling. Normal range of motion.      Comments: R femoral IABP   Skin:     General: Skin is warm.      Capillary Refill: Capillary refill takes less than 2 seconds.      Coloration: Skin is not pale.        Vents:  Vent Mode: A/C (07/01/22 0655)  Ventilator Initiated: Yes (06/30/22 1326)  Set Rate: 24 BPM (07/01/22 0655)  Vt Set: 480 mL (07/01/22 0655)  PEEP/CPAP: 5 cmH20 (07/01/22 0655)  Oxygen Concentration (%): 50 (07/01/22 0945)  Peak Airway Pressure: 22 cmH2O (07/01/22 0655)  Plateau Pressure: 21 cmH20 (07/01/22 0655)  Total Ve: 16.1 mL (07/01/22 0655)  Negative Inspiratory Force (cm H2O): 0 (07/01/22 0655)  F/VT Ratio<105 (RSBI): (!) 59.43 (07/01/22 0655)    Lines/Drains/Airways       Central Venous Catheter Line  Duration             Pulmonary Artery Catheter Assessment  06/30/22 0725 1 day              Drain  Duration                  Urethral Catheter 06/30/22 0723 Straight-tip;Temperature probe;Non-latex 14 Fr. 1 day         Chest Tube 06/30/22 1200 3 Right Pleural 19 Fr. <1 day         Y Chest Tube 1 and 2 06/30/22 1200 1 Anterior Mediastinal 19 Fr. 2 Anterior Mediastinal 19 Fr. <1 day              Airway  Duration                  Airway - Non-Surgical 06/30/22 0722 1 day              Arterial Line  Duration             Arterial Line 06/30/22 0710 Left Radial 1 day              Peripheral Intravenous Line  Duration                  Peripheral IV - Single Lumen 14 G  Right Forearm -- days         Peripheral IV - Single Lumen 06/30/22 0622 18 G Distal;Left;Posterior Forearm 1 day                    Significant Labs:    CBC/Anemia Profile:  Recent Labs   Lab 06/30/22  1314 06/30/22  1316 07/01/22  0356 07/01/22  0409 07/01/22  0702   WBC 21.63*  --   --  10.45  --    HGB 13.0*  --   --  9.3*  --    HCT 42.4   < > 29* 30.3* 26*   PLT 99*  --   --  71*  --    MCV 84  --   --  82  --    RDW 15.3*  --   --  15.7*  --     < > = values in this interval not displayed.        Chemistries:  Recent Labs   Lab 06/30/22  1314 06/30/22 2035 07/01/22 0105 07/01/22  0409    146* 144 144   K 3.6  3.6 4.3 3.9 3.9   * 113* 110 109   CO2 18* 16* 17* 22*   BUN 18 22 24* 25*   CREATININE 1.5* 1.8* 2.1* 1.9*    CALCIUM 8.5* 8.2* 8.1* 8.3*   ALBUMIN 2.7*  --  3.4* 3.3*   PROT 5.0*  --  4.7* 4.5*   BILITOT 1.0  --  0.5 0.4   ALKPHOS 45*  --  28* 29*   ALT 19  --  11 12   AST 90*  --  63* 59*   MG 2.6 2.7*  --  2.6   PHOS 4.6*  --   --  2.0*       All pertinent labs within the past 24 hours have been reviewed.    Significant Imaging:  I have reviewed all pertinent imaging results/findings within the past 24 hours.

## 2022-07-01 NOTE — PROGRESS NOTES
[facility]  Critical Care - Surgery  Progress Note    Patient Name: Artemio Ogden  MRN: [id number]  Admission Date: 6/30/2022  Hospital Length of Stay: 1 days  Code Status: Full Code  Attending Provider: Kurtis Machado MD  Primary Care Provider: Danna Lanza MD   Principal Problem: Mitral regurgitation    Subjective:     Hospital/ICU Course:  No notes on file    Interval History/Significant Events: No acute events overnight. He received 2 L of fluid. Lactic was downtrending from 12 to 5. Weaning vasopressors. Low UOP.    Follow-up For: Procedure(s) (LRB):  Valvuloplasty, Mitral (N/A)  MAZE (N/A)  INSERTION, INTRA-AORTIC BALLOON PUMP (Right)    Post-Operative Day: 1 Day Post-Op    Objective:     Vital Signs (Most Recent):  Temp: 97.8 °F (36.6 °C) (07/01/22 0715)  Pulse: 66 (07/01/22 0945)  Resp: 20 (07/01/22 0945)  BP: (!) 141/84 (06/30/22 0536)  SpO2: 99 % (07/01/22 0945)   Vital Signs (24h Range):  Temp:  [97.5 °F (36.4 °C)-98.5 °F (36.9 °C)] 97.8 °F (36.6 °C)  Pulse:  [] 66  Resp:  [0-34] 20  SpO2:  [93 %-100 %] 99 %  Arterial Line BP: ()/(41-62) 95/49     Weight: 108.9 kg (240 lb)  Body mass index is 31.66 kg/m².      Intake/Output Summary (Last 24 hours) at 7/1/2022 1002  Last data filed at 7/1/2022 0900  Gross per 24 hour   Intake 6148.76 ml   Output 1776 ml   Net 4372.76 ml       Physical Exam  Vitals and nursing note reviewed.   Constitutional:       Comments: Intubated and sedated   HENT:      Head: Normocephalic.      Mouth/Throat:      Comments: ETT and OG tube in place  Eyes:      General: No scleral icterus.     Conjunctiva/sclera: Conjunctivae normal.   Neck:      Comments: RIJ CVC  Cardiovascular:      Rate and Rhythm: Normal rate.      Comments: Midline sternotomy incision with clean, dry dressing in place  Ventricular pacing wires  (2) mediastinal and (1) pleural chest tube to suction  Pulmonary:      Effort: No respiratory distress.      Comments:  Mechanically ventilated  Abdominal:      General: Abdomen is flat. There is no distension.      Palpations: Abdomen is soft.   Genitourinary:     Comments: Talley  Musculoskeletal:         General: No swelling. Normal range of motion.      Comments: R femoral IABP   Skin:     General: Skin is warm.      Capillary Refill: Capillary refill takes less than 2 seconds.      Coloration: Skin is not pale.       Vents:  Vent Mode: A/C (07/01/22 0655)  Ventilator Initiated: Yes (06/30/22 1326)  Set Rate: 24 BPM (07/01/22 0655)  Vt Set: 480 mL (07/01/22 0655)  PEEP/CPAP: 5 cmH20 (07/01/22 0655)  Oxygen Concentration (%): 50 (07/01/22 0945)  Peak Airway Pressure: 22 cmH2O (07/01/22 0655)  Plateau Pressure: 21 cmH20 (07/01/22 0655)  Total Ve: 16.1 mL (07/01/22 0655)  Negative Inspiratory Force (cm H2O): 0 (07/01/22 0655)  F/VT Ratio<105 (RSBI): (!) 59.43 (07/01/22 0655)    Lines/Drains/Airways       Central Venous Catheter Line  Duration             Pulmonary Artery Catheter Assessment  06/30/22 0725 1 day              Drain  Duration                  Urethral Catheter 06/30/22 0723 Straight-tip;Temperature probe;Non-latex 14 Fr. 1 day         Chest Tube 06/30/22 1200 3 Right Pleural 19 Fr. <1 day         Y Chest Tube 1 and 2 06/30/22 1200 1 Anterior Mediastinal 19 Fr. 2 Anterior Mediastinal 19 Fr. <1 day              Airway  Duration                  Airway - Non-Surgical 06/30/22 0722 1 day              Arterial Line  Duration             Arterial Line 06/30/22 0710 Left Radial 1 day              Peripheral Intravenous Line  Duration                  Peripheral IV - Single Lumen 14 G  Right Forearm -- days         Peripheral IV - Single Lumen 06/30/22 0622 18 G Distal;Left;Posterior Forearm 1 day                    Significant Labs:    CBC/Anemia Profile:  Recent Labs   Lab 06/30/22  1314 06/30/22  1316 07/01/22  0356 07/01/22  0409 07/01/22  0702   WBC 21.63*  --   --  10.45  --    HGB 13.0*  --   --  9.3*  --    HCT 42.4   <  > 29* 30.3* 26*   PLT 99*  --   --  71*  --    MCV 84  --   --  82  --    RDW 15.3*  --   --  15.7*  --     < > = values in this interval not displayed.        Chemistries:  Recent Labs   Lab 06/30/22  1314 06/30/22  2035 07/01/22  0105 07/01/22  0409    146* 144 144   K 3.6  3.6 4.3 3.9 3.9   * 113* 110 109   CO2 18* 16* 17* 22*   BUN 18 22 24* 25*   CREATININE 1.5* 1.8* 2.1* 1.9*   CALCIUM 8.5* 8.2* 8.1* 8.3*   ALBUMIN 2.7*  --  3.4* 3.3*   PROT 5.0*  --  4.7* 4.5*   BILITOT 1.0  --  0.5 0.4   ALKPHOS 45*  --  28* 29*   ALT 19  --  11 12   AST 90*  --  63* 59*   MG 2.6 2.7*  --  2.6   PHOS 4.6*  --   --  2.0*       All pertinent labs within the past 24 hours have been reviewed.    Significant Imaging:  I have reviewed all pertinent imaging results/findings within the past 24 hours.    Assessment/Plan:     * Mitral regurgitation  Mr. Ogden is a 72 year old male with a hx of spinal stenosis, bilateral carotid artery stenosis, paroxysmal Afib, and severe mitral regurgitation. Now s/p Mitral valve repair, MAZE, Left Atrial Appendage Resection, and placement of intra-aortic balloon pump on 6/30/22.      Neuro/Psych:   -- Sedation: Propofol. Wean as tolerated.  -- Pain: PRN Oxy + Dilaudid  -- Scheduled Tylenol             Cards:   -- S/P Mitral Valve Repair, MAZE, Left Atrial Appendage Resection on 6/30/22  -- IABP at 1:1 ratio. Plan to remove today.  -- Requiring moderate-high dose epinephrine for ionotropy. Would like to wean down to .06 if tolerable.  -- Ventricular pacing wires. Backup paced  -- MAP 60-80  -- Cleviprex PRN  -- Aspirin 325mg tonight pending postoperative coags and chest tube output      Pulm:   -- Goal O2 sat > 90%  -- ABG PRN  -- Wean vent as tolerated  -- Plan to attempt extubation this afternoon  -- Mediastinal chest tubes x2 and pleural chest tube x1 to suction  -- Daily CXR      Renal:  -- Keep reyes for strict I/O  -- Trend BUN/Cr   -- Possible diuresis in the afternoon       FEN / GI:   -- Replace lytes as needed  -- Nutrition: NPO  -- GI ppx: famotidine  -- Bowel reg: miralax  -- 5% Albumin as needed for postoperative resuscitation      ID:   -- Tm: afebrile; WBC stable  -- Margy-op ancef      Heme/Onc:   -- H/H stable   -- Daily CBC  -- 700mL Cell saver given back  -- Post-op coags pending  -- Aspirin 325mg QD      Endo:   -- BG goal 140-180  -- Insulin gtt      PPx:   Feeding: NPO  Analgesia/Sedation: Propofol / PRN Oxy + Dilaudid  Thromboembolic prevention: SCDs  HOB >30: Yes  Stress Ulcer ppx: famotidine  Glucose control: Critical care goal 140-180 g/dl, ISS     Lines/Drains/Airway: CVC RIJ, Talley, Chest tubes x 3, ventricular pacing wires, L radial A-line, R Fem IABP      Dispo/Code Status/Palliative:   -- SICU / Full Code.          Nuha Rashid MD  Critical Care - Surgery  [facility]

## 2022-07-01 NOTE — PT/OT/SLP PROGRESS
Physical Therapy      Patient Name:  Artemio Ogden   MRN:  [id number]    Patient not seen today. Pt intubated and not medically appropriate for therapy services. Will follow-up when appropriate.

## 2022-07-01 NOTE — PT/OT/SLP PROGRESS
Occupational Therapy      Patient Name:  Artemio Ogden   MRN:  [id number]    Pt not seen this date. Pt scheduled for extubation this date per nsg with OT to check status at later date to initiate therapy services.     7/1/2022

## 2022-07-01 NOTE — HPI
Reason for Consult: Management of Hyperglycemia     Surgical Procedure and Date:   6/30/22  Valvuloplasty, Mitral (N/A)  MAZE (N/A)  INSERTION, INTRA-AORTIC BALLOON PUMP (Right)       HPI:   Patient is a 72 y.o. male with a diagnosis of CAD, HTN, CHF, pAFib s/p DCCV (on eliquis), known MR, thyroid disease, sleep apnea (no CPAP), and GERD. Patient referred to CTS for worsening MV insufficiency from moderate to severe, associated with sx of orthopnea, VILLANUEVA, fatigue, and palpitations. Patient now presents for the above procedure(s). Endocrinology consulted for management of hyperglycemia.    Lab Results   Component Value Date    HGBA1C 5.8 (H) 06/28/2022

## 2022-07-01 NOTE — OP NOTE
DATE OF PROCEDURE:  06/30/2022     ATTENDING SURGEON:  Kurtis Machado M.D.    ASSISTANT: Lenny Blackmon MD, (Cardiothoracic Resident)     PREOPERATIVE DIAGNOSES:  1.  Severe mitral regurgitation.  2.  Atrial fibrillation  3.  Sleep apnea  4.  Chronic systolic heart failure    POSTOPERATIVE DIAGNOSES:  1.  Severe mitral regurgitation.  2.  Atrial fibrillation  3.  Sleep apnea  4.  Chronic systolic heart failure    OPERATION PERFORMED:      1)  Mitral valve repair with a 30 mm Medtronic Future band annuloplasty.    2)  Maze procedure with complete left and right atrial lesion sets and resection of left atrial appendage    3)  Percutaneous insertion of intra-aortic balloon pump     ANESTHESIA:  General endotracheal.     ESTIMATED BLOOD LOSS:  100 mL.      BRIEF HISTORY: Mr. Ogden is a very pleasant 72-year-old gentleman who initially presented with dyspnea on exertion, fatigue, and orthopnea.  He underwent a thorough evaluation which demonstrated moderate to severe mitral regurgitation as well as atrial fibrillation. His ejection fraction is 35%. He now presents for surgical correction.     PROCEDURE IN DETAIL:  After obtaining informed and written consent, the patient   was brought to the Operating Room and placed on the operating table in supine   position.  After induction of adequate general endotracheal anesthesia, the   patient's chest, abdomen, pelvis and bilateral lower extremities were prepped   and draped in the usual sterile fashion.  An upper midline skin incision was   made, and a median sternotomy was performed.  A pericardial well was created.    The patient was systemically heparinized.  Cannulation sutures were placed in   the aorta and in the superior vena cava.  The aortic cannula was inserted,   followed by the venous.  An IVC cannula was also placed.  Antegrade and   retrograde cardioplegia catheters were placed.  The patient was then put on   cardiopulmonary bypass.  Once on bypass,  circumferential control was obtained   over the superior vena cava.  The aortic crossclamp was applied, and the heart   was arrested using cold blood enhanced antegrade cardioplegia.  A prompt   electromechanical arrest was achieved.    Once the cardioplegia was all in, we first addressed the left-sided pulmonary veins.  A circumferential lesion was made using the AtriCure device.  This lesion was duplicated.  The left atrial appendage was then opened.  It was free of thrombus.  A connecting lesion was made from the appendage down onto the left superior pulmonary vein, again   using the AtriCure device.  The left atrial appendage was then resected using   the Trafford stapler.  We then turned our attention to the right-sided pulmonary   veins, where again a double circumferential lesion was made using the AtriCure   device.  A left atriotomy was then made near the right-sided pulmonary veins.    Through the left atriotomy, connecting lesions were made from the atriotomy   first to the left superior pulmonary vein, and secondly from the atriotomy to   the left inferior pulmonary vein.  Finally, a connecting lesion was made that   was directed from the atriotomy towards the junction of the P2 and P3 scallops   of the posterior leaflet of the mitral valve.  Although this lesion was directed   towards the junction of the P2 and P3 scallops, it did not reach the mitral   annulus.  It was created using the AtriCure device.  The lesion was completed to   the level of the mitral annulus using the AtriCure cryoprobe. The coronary sinus lesion was then created using the Atricure cryoprobe. The Singleton   retractor was then placed for exposure, and the mitral valve was evaluated.    The leaflets appeared structurally normal.  Upon testing, there was a central jet.  Multiple nonpledgeted 2-0 Ethibond sutures were placed circumferentially around the annulus. The annulus was   sized, and a 30 mm complete ring was selected.  The ring  was brought up, the annuloplasty   sutures were passed through it, and it was slid into position.  It seated   nicely.  The sutures were tied and then cut.  The valve was again tested, and   no mitral regurgitation was seen.  The left atriotomy was then closed in a   single layer using running 4-0 Prolene suture.  Prior to closing the left   atrium, de-airing maneuvers were performed.  Once the left atrium was closed,   the hot shot was given retrograde.  Additional de-airing maneuvers were   performed, and the aortic cross-clamp was removed.     The right atrial appendage isolation lesion was created using the Atricure device.  The coronary sinus catheter was removed, and through the site in the right atrial free wall through which it had been placed lesions were created first into the superior vena cava and then secondly into the inferior vena cava, both using the Atricure device.  The coronary sinus catheter site was then oversewn with a pledgeted 4-0 Prolene suture.    Given the patient's known poor ejection fraction, an intra-aortic balloon pump was placed in the right common femoral artery using Seldinger technique.  The patient was subsequently weaned from cardiopulmonary bypass.  The patient did separate easily from   bypass.  Once off bypass, all surgical sites were inspected.  There was good   hemostasis.  The valve was evaluated using RUCHI.  There was no residual mitral   regurgitation seen.  The test   dose of protamine was administered, and this was well tolerated.  The total dose   was then given.  shelter through the total dose, all cannulas were removed.    All surgical sites were again inspected, again there was good hemostasis.    Ventricular pacing wires were placed.  Drains were placed.  After again   confirming adequate hemostasis, the patient's chest was closed using eight #6   stainless steel wires to reapproximate the sternum.  The overlying soft tissues   were reapproximated using absorbable  suture material.  The patient's chest was   washed and dried, and a dry dressing was applied.  The patient tolerated the   procedure well, there were no complications.  At the conclusion of the case,   sponge and instrument counts were correct.

## 2022-07-01 NOTE — PLAN OF CARE
[facility]  Initial Discharge Assessment       Primary Care Provider: Danna Lanza MD    Admission Diagnosis: Mitral valve insufficiency, unspecified etiology [I34.0]  Paroxysmal atrial fibrillation [I48.0]    Admission Date: 6/30/2022  Expected Discharge Date:     Discharge Barriers Identified: None    Payor: MEDICARE / Plan: MEDICARE PART A & B / Product Type: Government /     Extended Emergency Contact Information  Primary Emergency Contact: Ginger Ogden  Address: [address] [address]           [place] [address] [facility]  Home Phone: [phone]  Mobile Phone: [phone]  Relation: Spouse  Secondary Emergency Contact: Robyn Moe  Mobile Phone: [phone]  Relation: Daughter    Discharge Plan A: Rehab  Discharge Plan B: Home Health      [id number] - [place] - [address] AT [facility]  [address]  [place] [address]  Phone: [phone] Fax: [phone]      Initial Assessment (most recent)     Adult Discharge Assessment - 07/01/22 1149        Discharge Assessment    Assessment Type Discharge Planning Assessment     Confirmed/corrected address, phone number and insurance Yes     Confirmed Demographics Correct on Facesheet     Source of Information family     Reason For Admission Mitral Regurgitation     Lives With spouse;child(rocío), adult     Facility Arrived From: home     Do you expect to return to your current living situation? Yes     Do you have help at home or someone to help you manage your care at home? Yes     Who are your caregiver(s) and their phone number(s)? brandon Moe [phone]     Prior to hospitilization cognitive status: Alert/Oriented     Current cognitive status: Coma/Sedated/Intubated     Walking or Climbing Stairs Difficulty none     Dressing/Bathing Difficulty none     Equipment Currently Used at Home CPAP     Patient currently being followed by outpatient case management? No     Do you currently have service(s)  that help you manage your care at home? No     Who is going to help you get home at discharge? daughter Robyn     How do you get to doctors appointments? family or friend will provide     Are you on dialysis? No     Do you take coumadin? No     Discharge Plan A Rehab     Discharge Plan B Home Health     DME Needed Upon Discharge  other (see comments)   TBD    Discharge Barriers Identified None               Pt lives in a 2 story home with his spouse who is nonverbal with 0 steps to enter.  Pt's daughter lives next door and will be pt's main source of support.  Pt is not on Coumadin/HD.  Pt's daughter will provide transportation home at time of d/c.    Holley Griffin RN CM  Case Management  [id number]

## 2022-07-01 NOTE — PLAN OF CARE
Recommendations     1) ADAT to cardiac diet.      2) If pt eating <50% of meals, rec'd Boost Plus TID to aid in kcal/protein intake.      3) RD to monitor and f/u.     Goals: Meet % of estimated kcal/protein needs by RD f/u date  Nutrition Goal Status: new  Communication of RD Recs:  (POC)

## 2022-07-01 NOTE — NURSING
2100: MD Milton notified pt ABG values and critical pH 7.259, lactic 11.28, CVP 10. Orders to go up to 0.12 mcg/kg/min epi then start levo.     2200: MD Maya notified pt ABG values and critical pH 7.255, HCO3 16.1, lactic 11.06, epi at 0.12 mcg/kg/min, levo .02 mcg/kg/min. Orders to give 2 amps of bicarb, 500 albumin, and go up to 0.08 mcg/kg/min levo and 0.12 mcg/kg/min epi, then start vaso 0.04 units/min

## 2022-07-01 NOTE — CONSULTS
[facility]  Endocrinology  Diabetes Consult Note    Consult Requested by: Kurtis Machado MD   Reason for admit: Mitral regurgitation    HISTORY OF PRESENT ILLNESS:  Reason for Consult: Management of Hyperglycemia     Surgical Procedure and Date:   6/30/22  Valvuloplasty, Mitral (N/A)  MAZE (N/A)  INSERTION, INTRA-AORTIC BALLOON PUMP (Right)       HPI:   Patient is a 72 y.o. male with a diagnosis of CAD, HTN, CHF, pAFib s/p DCCV (on eliquis), known MR, thyroid disease, sleep apnea (no CPAP), and GERD. Patient referred to CTS for worsening MV insufficiency from moderate to severe, associated with sx of orthopnea, VILLANUEVA, fatigue, and palpitations. Patient now presents for the above procedure(s). Endocrinology consulted for management of hyperglycemia.    Lab Results   Component Value Date    HGBA1C 5.8 (H) 06/28/2022           Interval HPI:   Overnight events: Remains in SICU. POD 1. Remains intubated. BG well controlled on IV intensive insulin protocol with rates ranging from 3.5-9.5 u/hr overnight.   Eating:   NPO  Nausea: No  Hypoglycemia and intervention: No  Fever: No  TPN and/or TF: No  If yes, type of TF/TPN and rate: n/a    PMH, PSH, FH, SH reviewed     ROS:  Unable to obtain due to: Sedation,Intubation,Altered mental status,Critical illness,Reviewed ROS from note dated 6/30/22 by  Lenny Blackmon MD    Review of Systems    Current Medications and/or Treatments Impacting Glycemic Control  Immunotherapy:    Immunosuppressants       None          Steroids:   Hormones (From admission, onward)                Start     Stop Route Frequency Ordered    07/01/22 0059  vasopressin (PITRESSIN) 0.2 Units/mL in dextrose 5 % 100 mL infusion         -- IV Continuous 07/01/22 0059          Pressors:    Autonomic Drugs (From admission, onward)                Start     Stop Route Frequency Ordered    06/30/22 1400  EPINEPHrine (ADRENALIN) 5 mg in dextrose 5 % 250 mL infusion        Question Answer  Comment   Begin at (in mcg/kg/min): 0.02    Titrate by: (in mcg/kg/min) 0.02    Titrate interval: (in minutes) 15    Titrate to maintain: (SBP or MAP or Cardiac Index) MAP    Greater than: (in mmHg) 60    Maximum dose: (in mcg/kg/min) 0.1        -- IV Continuous 06/30/22 1250    06/30/22 1400  NORepinephrine 4 mg in dextrose 5% 250 mL infusion (premix) (titrating)        Question Answer Comment   Begin at (in mcg/kg/min): 0.02    Titrate by: (in mcg/kg/min) 0.02    Titrate interval: (in minutes) 5    Titrate to maintain: (MAP or SBP) MAP    Greater than: (in mmHg) 65    Maximum dose: (in mcg/kg/min) 3        -- IV Continuous 06/30/22 1250          Hyperglycemia/Diabetes Medications:   Antihyperglycemics (From admission, onward)                Start     Stop Route Frequency Ordered    06/30/22 1345  insulin regular in 0.9 % NaCl 100 unit/100 mL (1 unit/mL) infusion        Question Answer Comment   Insulin rate changes (DO NOT MODIFY ANSWER) \\[email or web address] [email or web address]    Enter initial dose (Units/hr): 2        -- IV Continuous 06/30/22 1250             PHYSICAL EXAMINATION:  Vitals:    07/01/22 1045   BP:    Pulse: 74   Resp: (!) 0   Temp:      Body mass index is 31.66 kg/m².    Physical Exam  Constitutional: Well developed, obese, NAD.  ENT: External ears no masses with nose patent  Neck: Supple; trachea midline  Cardiovascular: Normal heart sounds, no LE edema. DP +2 bilaterally.  Lungs: Normal effort; lungs anterior bilaterally clear to auscultation.  Intubated on a ventilator.  Abdomen: Soft, no masses, no hernias.  Hypoactive BS noted.  MS: No clubbing or cyanosis of nails noted; unable to assess gait.  Skin: No rashes, lesions, or ulcers; no nodules.  Mid-sternal incision with telfa island dressing, CDI.  CT x 2.  LLE wrapped with ACE wrap.  Psychiatric: ROEL  Neurological: ROEL  Foot: Nails in good condition, no amputations noted        Labs Reviewed and Include    Recent Labs   Lab 07/01/22  0409   *   CALCIUM 8.3*   ALBUMIN 3.3*   PROT 4.5*      K 3.9   CO2 22*      BUN 25*   CREATININE 1.9*   ALKPHOS 29*   ALT 12   AST 59*   BILITOT 0.4     Lab Results   Component Value Date    WBC 10.45 07/01/2022    HGB 9.3 (L) 07/01/2022    HCT 26 (L) 07/01/2022    MCV 82 07/01/2022    PLT 71 (L) 07/01/2022     No results for input(s): TSH, FREET4 in the last 168 hours.  Lab Results   Component Value Date    HGBA1C 5.8 (H) 06/28/2022       Nutritional status:   Body mass index is 31.66 kg/m².  Lab Results   Component Value Date    ALBUMIN 3.3 (L) 07/01/2022    ALBUMIN 3.4 (L) 07/01/2022    ALBUMIN 2.7 (L) 06/30/2022     No results found for: PREALBUMIN    Estimated Creatinine Clearance: 44.1 mL/min (A) (based on SCr of 1.9 mg/dL (H)).    Accu-Checks  Recent Labs     06/30/22  2210 06/30/22  2310 07/01/22  0010 07/01/22  0107 07/01/22  0158 07/01/22  0257 07/01/22  0404 07/01/22  0508 07/01/22  0558 07/01/22  0718   POCTGLUCOSE 254* 238* 229* 259* 227* 230* 201* 153* 116* 76        ASSESSMENT and PLAN    * Mitral regurgitation  Managed per primary team  Optimize BG control        Stress hyperglycemia  BG goal 140-180    Patient has fallen off IV intensive insulin protocol. IV insulin infusion off since 0700.     Discontinue IV intensive insulin protocol  Start Moderate Dose Correction Scale  BG monitoring q 4 hrs while NPO    ** Please call Endocrine for any BG related issues **    Discharge plans: TBD      Paroxysmal atrial fibrillation  May increase insulin resistance.         S/P mitral valve repair  Managed per primary team  Optimize BG control          Plan discussed with patient, family, and RN at bedside.     Eladia Sanders, NP  Endocrinology  [facility] - Surgical Intensive Care

## 2022-07-01 NOTE — PLAN OF CARE
SICU PLAN OF CARE NOTE    Dx: Mitral regurgitation    Shift Events: IABP removed. Pt extubated to NC. Epi weaned as tolerated throughout shift to maintain MAP goal.     Gtts:     Amio @ 0.5  Epi @ 0.04    Neuro: AAO x4, Follows Commands and Moves All Extremities    Cardiac: Paced rhythm, HR 60-80s    Respiratory: Nasal Cannula    GI: NPO    : Urinary Catheter 30-75 cc/hr    Drains:     Meds CT: 70 mL/shift, serosanguinous  Pl CT: 80 mL/shift, serosanguinous     Labs/Accuchecks: Daily labs reviewed. Q4h accuchecks.    Skin: Midsternal incision and chest tube site dressings CDI. Bed plugged in with mattress inflated. Heels elevated off of bed with green boots. Heel and sacral foams in place. HOB flat for majority of shift due to instrumentation present.

## 2022-07-01 NOTE — ASSESSMENT & PLAN NOTE
BG goal 140-180    Patient has fallen off IV intensive insulin protocol. IV insulin infusion off since 0700.     Discontinue IV intensive insulin protocol  Start Moderate Dose Correction Scale  BG monitoring q 4 hrs while NPO    ** Please call Endocrine for any BG related issues **    Discharge plans: [facility]

## 2022-07-01 NOTE — ASSESSMENT & PLAN NOTE
Mr. Ogden is a 72 year old male with a hx of spinal stenosis, bilateral carotid artery stenosis, paroxysmal Afib, and severe mitral regurgitation. Now s/p Mitral valve repair, MAZE, Left Atrial Appendage Resection, and placement of intra-aortic balloon pump on 6/30/22.      Neuro/Psych:   -- Sedation: Propofol. Wean as tolerated.  -- Pain: PRN Oxy + Dilaudid  -- Scheduled Tylenol             Cards:   -- S/P Mitral Valve Repair, MAZE, Left Atrial Appendage Resection on 6/30/22  -- IABP at 1:1 ratio. Plan to remove today.  -- Requiring moderate-high dose epinephrine for ionotropy. Would like to wean down to .06 if tolerable.  -- Ventricular pacing wires. Backup paced  -- MAP 60-80  -- Cleviprex PRN  -- Aspirin 325mg tonight pending postoperative coags and chest tube output      Pulm:   -- Goal O2 sat > 90%  -- ABG PRN  -- Wean vent as tolerated  -- Plan to attempt extubation this afternoon  -- Mediastinal chest tubes x2 and pleural chest tube x1 to suction  -- Daily CXR      Renal:  -- Keep reyes for strict I/O  -- Trend BUN/Cr   -- Possible diuresis in the afternoon      FEN / GI:   -- Replace lytes as needed  -- Nutrition: NPO  -- GI ppx: famotidine  -- Bowel reg: miralax  -- 5% Albumin as needed for postoperative resuscitation      ID:   -- Tm: afebrile; WBC stable  -- Margy-op ancef      Heme/Onc:   -- H/H stable   -- Daily CBC  -- 700mL Cell saver given back  -- Post-op coags pending  -- Aspirin 325mg QD      Endo:   -- BG goal 140-180  -- Insulin gtt      PPx:   Feeding: NPO  Analgesia/Sedation: Propofol / PRN Oxy + Dilaudid  Thromboembolic prevention: SCDs  HOB >30: Yes  Stress Ulcer ppx: famotidine  Glucose control: Critical care goal 140-180 g/dl, ISS     Lines/Drains/Airway: CVC RIJ, Reyes, Chest tubes x 3, ventricular pacing wires, L radial A-line, R Fem IABP      Dispo/Code Status/Palliative:   -- SICU / Full Code.

## 2022-07-01 NOTE — SUBJECTIVE & OBJECTIVE
Interval HPI:   Overnight events: Remains in SICU. POD 1. Remains intubated. BG well controlled on IV intensive insulin protocol with rates ranging from 3.5-9.5 u/hr overnight.   Eating:   NPO  Nausea: No  Hypoglycemia and intervention: No  Fever: No  TPN and/or TF: No  If yes, type of TF/TPN and rate: n/a    PMH, PSH, FH, SH reviewed     ROS:  Unable to obtain due to: Sedation,Intubation,Altered mental status,Critical illness,Reviewed ROS from note dated 6/30/22 by  Lenny Blackmon MD    Review of Systems    Current Medications and/or Treatments Impacting Glycemic Control  Immunotherapy:    Immunosuppressants       None          Steroids:   Hormones (From admission, onward)                Start     Stop Route Frequency Ordered    07/01/22 0059  vasopressin (PITRESSIN) 0.2 Units/mL in dextrose 5 % 100 mL infusion         -- IV Continuous 07/01/22 0059          Pressors:    Autonomic Drugs (From admission, onward)                Start     Stop Route Frequency Ordered    06/30/22 1400  EPINEPHrine (ADRENALIN) 5 mg in dextrose 5 % 250 mL infusion        Question Answer Comment   Begin at (in mcg/kg/min): 0.02    Titrate by: (in mcg/kg/min) 0.02    Titrate interval: (in minutes) 15    Titrate to maintain: (SBP or MAP or Cardiac Index) MAP    Greater than: (in mmHg) 60    Maximum dose: (in mcg/kg/min) 0.1        -- IV Continuous 06/30/22 1250    06/30/22 1400  NORepinephrine 4 mg in dextrose 5% 250 mL infusion (premix) (titrating)        Question Answer Comment   Begin at (in mcg/kg/min): 0.02    Titrate by: (in mcg/kg/min) 0.02    Titrate interval: (in minutes) 5    Titrate to maintain: (MAP or SBP) MAP    Greater than: (in mmHg) 65    Maximum dose: (in mcg/kg/min) 3        -- IV Continuous 06/30/22 1250          Hyperglycemia/Diabetes Medications:   Antihyperglycemics (From admission, onward)                Start     Stop Route Frequency Ordered    06/30/22 1345  insulin regular in 0.9 % NaCl 100 unit/100 mL (1  unit/mL) infusion        Question Answer Comment   Insulin rate changes (DO NOT MODIFY ANSWER) \\[email or web address] [email or web address] [id number]    Enter initial dose (Units/hr): 2        -- IV Continuous 06/30/22 1250             PHYSICAL EXAMINATION:  Vitals:    07/01/22 1045   BP:    Pulse: 74   Resp: (!) 0   Temp:      Body mass index is 31.66 kg/m².    Physical Exam  Constitutional: Well developed, obese, NAD.  ENT: External ears no masses with nose patent  Neck: Supple; trachea midline  Cardiovascular: Normal heart sounds, no LE edema. DP +2 bilaterally.  Lungs: Normal effort; lungs anterior bilaterally clear to auscultation.  Intubated on a ventilator.  Abdomen: Soft, no masses, no hernias.  Hypoactive BS noted.  MS: No clubbing or cyanosis of nails noted; unable to assess gait.  Skin: No rashes, lesions, or ulcers; no nodules.  Mid-sternal incision with telfa island dressing, CDI.  CT x 2.  LLE wrapped with ACE wrap.  Psychiatric: ROEL  Neurological: ROEL  Foot: Nails in good condition, no amputations noted

## 2022-07-01 NOTE — CARE UPDATE
Pt admitted to SICU [id number] s/p MVR/MAZE. Connected to ICU monitor and vent, post-op orders released and implemented. Pt reassessed per flowsheets. Pts daughter at bedside and updated on current status. See flowsheets for VS, infusion, lab details.

## 2022-07-01 NOTE — NURSING
MD Gay at bedside and notified pt has right leg bruising next to IABP site. Soft to touch and area marked. Orders to notify if site gets larger

## 2022-07-01 NOTE — ANESTHESIA POSTPROCEDURE EVALUATION
Anesthesia Post Evaluation    Patient: Artemio Ogden    Procedure(s) Performed: Procedure(s) (LRB):  Valvuloplasty, Mitral (N/A)  MAZE (N/A)  INSERTION, INTRA-AORTIC BALLOON PUMP (Right)    Final Anesthesia Type: general      Patient location during evaluation: ICU  Patient participation: No - Unable to Participate, Intubation  Level of consciousness: sedated  Post-procedure vital signs: reviewed and stable  Pain management: adequate  Airway patency: patent    PONV status at discharge: No PONV  Anesthetic complications: no      Cardiovascular status: hemodynamically stable (remains on inotropes and IABP)  Respiratory status: ventilator and ETT  Hydration status: euvolemic            Vitals Value Taken Time   /67 07/01/22 1002   Temp 36.6 °C (97.8 °F) 07/01/22 0715   Pulse 68 07/01/22 1028   Resp 7 07/01/22 1028   SpO2 98 % 07/01/22 1028   Vitals shown include unvalidated device data.      No case tracking events are documented in the log.      Pain/Gema Score: Pain Rating Prior to Med Admin: 0 (7/1/2022  5:34 AM)  Pain Rating Post Med Admin: 0 (6/30/2022 10:35 PM)

## 2022-07-01 NOTE — CONSULTS
"[facility] - [facility]  Adult Nutrition  Consult Note    SUMMARY     Recommendations    1) ADAT to cardiac diet.     2) If pt eating <50% of meals, rec'd Boost Plus TID to aid in kcal/protein intake.     3) RD to monitor and f/u.    Goals: Meet % of estimated kcal/protein needs by RD f/u date  Nutrition Goal Status: new  Communication of RD Recs:  (POC)    Assessment and Plan  Nutrition Problem  Inadequate energy intake    Related to (etiology):   Inability to meet sufficient needs    Signs and Symptoms (as evidenced by):   NPO     Interventions/Recommendations (treatment strategy):  Collaboration of nutrition care with other providers  Diet advancement as medically feasible    Nutrition Diagnosis Status:   New    Reason for Assessment    Reason For Assessment: consult (s/p MVr)  Diagnosis:  (mitral regurgitation)  Interdisciplinary Rounds: did not attend    General Information Comments: Pt POD#1 s/p MVr. Pt remains intubated/sedated.  No family in room at time of visit, unable to obtain nutrition related history at this time. Per chart review - wt appears stable over the past month. NFPE not warranted, pt appears well nourished. Pt does not meet criteria for malnutrition at this time.  Nutrition Discharge Planning: adequate nutrition, cardiac diet    Nutrition Risk Screen    Nutrition Risk Screen: no indicators present    Nutrition/Diet History    Spiritual, Cultural Beliefs, Restoration Practices, Values that Affect Care: no  Food Allergies: NKFA    Anthropometrics    Temp: 97.8 °F (36.6 °C)  Height Method: Stated  Height: 6' 1" (185.4 cm)  Height (inches): 73 in  Weight Method: Bed Scale  Weight: 108.9 kg (240 lb)  Weight (lb): 240 lb  Ideal Body Weight (IBW), Male: 184 lb  % Ideal Body Weight, Male (lb): 130.43 %  BMI (Calculated): 31.7    Lab/Procedures/Meds    Pertinent Labs Reviewed: reviewed  Pertinent Labs Comments: BUN 22, Crea 1.9, GFR 34, Glu 185, Ca8.3, Phos 2, Alb 3.3  Pertinent " Medications Reviewed: reviewed  Pertinent Medications Comments: KCl, potassium phosphate, epinephrine, propofol    Estimated/Assessed Needs    Weight Used For Calorie Calculations: 108.9 kg (240 lb 1.3 oz)  Energy Calorie Requirements (kcal): 2141 kcal  Energy Need Method: [place] (modified)  Protein Requirements: 109-131 (1-1.2g pro/kg)  Weight Used For Protein Calculations: 108.9 kg (240 lb 1.3 oz)  Estimated Fluid Requirement Method:  (1ml per kg or fluid per MD)  RDA Method (mL): 2141    Nutrition Prescription Ordered    Current Diet Order: NPO    Evaluation of Received Nutrient/Fluid Intake    Lipid Calories (kcals): 607 kcals (propofol @ 24 ml/hr)  I/O: +2L since admit  Comments: LBM 6/29  % Intake of Estimated Energy Needs: 0 - 25 %  % Meal Intake: NPO    Nutrition Risk    Level of Risk/Frequency of Follow-up:  (1x/week)     Monitor and Evaluation    Food and Nutrient Intake: energy intake, food and beverage intake  Food and Nutrient Adminstration: diet order  Knowledge/Beliefs/Attitudes: food and nutrition knowledge/skill  Physical Activity and Function: nutrition-related ADLs and IADLs  Anthropometric Measurements: weight, weight change  Biochemical Data, Medical Tests and Procedures: lipid profile, electrolyte and renal panel, glucose/endocrine profile, gastrointestinal profile, inflammatory profile  Nutrition-Focused Physical Findings: overall appearance     Nutrition Follow-Up    RD Follow-up?: Yes

## 2022-07-02 LAB
ALBUMIN SERPL BCP-MCNC: 3.4 G/DL (ref 3.5–5.2)
ALLENS TEST: ABNORMAL
ALP SERPL-CCNC: 35 U/L (ref 55–135)
ALT SERPL W/O P-5'-P-CCNC: 10 U/L (ref 10–44)
ANION GAP SERPL CALC-SCNC: 4 MMOL/L (ref 8–16)
APTT BLDCRRT: 36 SEC (ref 21–32)
AST SERPL-CCNC: 42 U/L (ref 10–40)
BASOPHILS # BLD AUTO: 0.02 K/UL (ref 0–0.2)
BASOPHILS NFR BLD: 0.2 % (ref 0–1.9)
BILIRUB SERPL-MCNC: 0.8 MG/DL (ref 0.1–1)
BUN SERPL-MCNC: 29 MG/DL (ref 8–23)
CALCIUM SERPL-MCNC: 8.3 MG/DL (ref 8.7–10.5)
CHLORIDE SERPL-SCNC: 109 MMOL/L (ref 95–110)
CO2 SERPL-SCNC: 26 MMOL/L (ref 23–29)
CREAT SERPL-MCNC: 1.5 MG/DL (ref 0.5–1.4)
DELSYS: ABNORMAL
DIFFERENTIAL METHOD: ABNORMAL
EOSINOPHIL # BLD AUTO: 0 K/UL (ref 0–0.5)
EOSINOPHIL NFR BLD: 0.2 % (ref 0–8)
ERYTHROCYTE [DISTWIDTH] IN BLOOD BY AUTOMATED COUNT: 16.3 % (ref 11.5–14.5)
EST. GFR  (AFRICAN AMERICAN): 52.3 ML/MIN/1.73 M^2
EST. GFR  (NON AFRICAN AMERICAN): 45.2 ML/MIN/1.73 M^2
GLUCOSE SERPL-MCNC: 133 MG/DL (ref 70–110)
HCO3 UR-SCNC: 26.9 MMOL/L (ref 24–28)
HCT VFR BLD AUTO: 28.5 % (ref 40–54)
HGB BLD-MCNC: 8.7 G/DL (ref 14–18)
IMM GRANULOCYTES # BLD AUTO: 0.09 K/UL (ref 0–0.04)
IMM GRANULOCYTES NFR BLD AUTO: 0.8 % (ref 0–0.5)
LYMPHOCYTES # BLD AUTO: 0.8 K/UL (ref 1–4.8)
LYMPHOCYTES NFR BLD: 6.4 % (ref 18–48)
MAGNESIUM SERPL-MCNC: 2.8 MG/DL (ref 1.6–2.6)
MCH RBC QN AUTO: 24.7 PG (ref 27–31)
MCHC RBC AUTO-ENTMCNC: 30.5 G/DL (ref 32–36)
MCV RBC AUTO: 81 FL (ref 82–98)
MONOCYTES # BLD AUTO: 0.9 K/UL (ref 0.3–1)
MONOCYTES NFR BLD: 7.8 % (ref 4–15)
NEUTROPHILS # BLD AUTO: 9.9 K/UL (ref 1.8–7.7)
NEUTROPHILS NFR BLD: 84.6 % (ref 38–73)
NRBC BLD-RTO: 0 /100 WBC
PCO2 BLDA: 49.2 MMHG (ref 35–45)
PH SMN: 7.34 [PH] (ref 7.35–7.45)
PHOSPHATE SERPL-MCNC: 4.5 MG/DL (ref 2.7–4.5)
PLATELET # BLD AUTO: 55 K/UL (ref 150–450)
PMV BLD AUTO: 10.5 FL (ref 9.2–12.9)
PO2 BLDA: 32 MMHG (ref 40–60)
POC BE: 1 MMOL/L
POC SATURATED O2: 56 % (ref 95–100)
POC TCO2: 28 MMOL/L (ref 24–29)
POCT GLUCOSE: 103 MG/DL (ref 70–110)
POCT GLUCOSE: 107 MG/DL (ref 70–110)
POCT GLUCOSE: 124 MG/DL (ref 70–110)
POCT GLUCOSE: 141 MG/DL (ref 70–110)
POCT GLUCOSE: 143 MG/DL (ref 70–110)
POCT GLUCOSE: 97 MG/DL (ref 70–110)
POTASSIUM SERPL-SCNC: 5.4 MMOL/L (ref 3.5–5.1)
PROT SERPL-MCNC: 5 G/DL (ref 6–8.4)
RBC # BLD AUTO: 3.52 M/UL (ref 4.6–6.2)
SAMPLE: ABNORMAL
SITE: ABNORMAL
SODIUM SERPL-SCNC: 139 MMOL/L (ref 136–145)
WBC # BLD AUTO: 11.7 K/UL (ref 3.9–12.7)

## 2022-07-02 PROCEDURE — 99291 CRITICAL CARE FIRST HOUR: CPT | Mod: GC,,, | Performed by: ANESTHESIOLOGY

## 2022-07-02 PROCEDURE — 63600175 PHARM REV CODE 636 W HCPCS

## 2022-07-02 PROCEDURE — 97535 SELF CARE MNGMENT TRAINING: CPT

## 2022-07-02 PROCEDURE — 63600175 PHARM REV CODE 636 W HCPCS: Performed by: STUDENT IN AN ORGANIZED HEALTH CARE EDUCATION/TRAINING PROGRAM

## 2022-07-02 PROCEDURE — 83735 ASSAY OF MAGNESIUM: CPT | Performed by: THORACIC SURGERY (CARDIOTHORACIC VASCULAR SURGERY)

## 2022-07-02 PROCEDURE — 25000003 PHARM REV CODE 250: Performed by: STUDENT IN AN ORGANIZED HEALTH CARE EDUCATION/TRAINING PROGRAM

## 2022-07-02 PROCEDURE — 99232 PR SUBSEQUENT HOSPITAL CARE,LEVL II: ICD-10-PCS | Mod: ,,, | Performed by: NURSE PRACTITIONER

## 2022-07-02 PROCEDURE — 97165 OT EVAL LOW COMPLEX 30 MIN: CPT

## 2022-07-02 PROCEDURE — 25000003 PHARM REV CODE 250: Performed by: THORACIC SURGERY (CARDIOTHORACIC VASCULAR SURGERY)

## 2022-07-02 PROCEDURE — 99291 PR CRITICAL CARE, E/M 30-74 MINUTES: ICD-10-PCS | Mod: GC,,, | Performed by: ANESTHESIOLOGY

## 2022-07-02 PROCEDURE — 85025 COMPLETE CBC W/AUTO DIFF WBC: CPT | Performed by: STUDENT IN AN ORGANIZED HEALTH CARE EDUCATION/TRAINING PROGRAM

## 2022-07-02 PROCEDURE — 27000221 HC OXYGEN, UP TO 24 HOURS

## 2022-07-02 PROCEDURE — 94761 N-INVAS EAR/PLS OXIMETRY MLT: CPT

## 2022-07-02 PROCEDURE — 84100 ASSAY OF PHOSPHORUS: CPT | Performed by: THORACIC SURGERY (CARDIOTHORACIC VASCULAR SURGERY)

## 2022-07-02 PROCEDURE — 97162 PT EVAL MOD COMPLEX 30 MIN: CPT

## 2022-07-02 PROCEDURE — 99232 SBSQ HOSP IP/OBS MODERATE 35: CPT | Mod: ,,, | Performed by: NURSE PRACTITIONER

## 2022-07-02 PROCEDURE — 97116 GAIT TRAINING THERAPY: CPT

## 2022-07-02 PROCEDURE — 99900035 HC TECH TIME PER 15 MIN (STAT)

## 2022-07-02 PROCEDURE — 80053 COMPREHEN METABOLIC PANEL: CPT | Performed by: STUDENT IN AN ORGANIZED HEALTH CARE EDUCATION/TRAINING PROGRAM

## 2022-07-02 PROCEDURE — 85730 THROMBOPLASTIN TIME PARTIAL: CPT | Performed by: THORACIC SURGERY (CARDIOTHORACIC VASCULAR SURGERY)

## 2022-07-02 PROCEDURE — 20000000 HC ICU ROOM

## 2022-07-02 RX ORDER — IBUPROFEN 200 MG
16 TABLET ORAL
Status: DISCONTINUED | OUTPATIENT
Start: 2022-07-02 | End: 2022-07-03

## 2022-07-02 RX ORDER — GLUCAGON 1 MG
1 KIT INJECTION
Status: DISCONTINUED | OUTPATIENT
Start: 2022-07-02 | End: 2022-07-03

## 2022-07-02 RX ORDER — IBUPROFEN 200 MG
24 TABLET ORAL
Status: DISCONTINUED | OUTPATIENT
Start: 2022-07-02 | End: 2022-07-03

## 2022-07-02 RX ORDER — INSULIN ASPART 100 [IU]/ML
0-5 INJECTION, SOLUTION INTRAVENOUS; SUBCUTANEOUS
Status: DISCONTINUED | OUTPATIENT
Start: 2022-07-02 | End: 2022-07-03

## 2022-07-02 RX ORDER — CARVEDILOL 3.12 MG/1
3.12 TABLET ORAL 2 TIMES DAILY
Status: DISCONTINUED | OUTPATIENT
Start: 2022-07-02 | End: 2022-07-07 | Stop reason: HOSPADM

## 2022-07-02 RX ORDER — POTASSIUM CHLORIDE 20 MEQ/1
20 TABLET, EXTENDED RELEASE ORAL 2 TIMES DAILY
Status: DISCONTINUED | OUTPATIENT
Start: 2022-07-02 | End: 2022-07-02

## 2022-07-02 RX ORDER — FUROSEMIDE 10 MG/ML
40 INJECTION INTRAMUSCULAR; INTRAVENOUS ONCE
Status: COMPLETED | OUTPATIENT
Start: 2022-07-02 | End: 2022-07-02

## 2022-07-02 RX ORDER — HEPARIN SODIUM 5000 [USP'U]/ML
5000 INJECTION, SOLUTION INTRAVENOUS; SUBCUTANEOUS EVERY 8 HOURS
Status: DISCONTINUED | OUTPATIENT
Start: 2022-07-02 | End: 2022-07-07 | Stop reason: HOSPADM

## 2022-07-02 RX ORDER — FUROSEMIDE 10 MG/ML
40 INJECTION INTRAMUSCULAR; INTRAVENOUS 2 TIMES DAILY
Status: DISCONTINUED | OUTPATIENT
Start: 2022-07-02 | End: 2022-07-07

## 2022-07-02 RX ADMIN — LEVOTHYROXINE SODIUM 100 MCG: 100 TABLET ORAL at 06:07

## 2022-07-02 RX ADMIN — CARVEDILOL 3.12 MG: 3.12 TABLET, FILM COATED ORAL at 08:07

## 2022-07-02 RX ADMIN — MUPIROCIN: 20 OINTMENT TOPICAL at 08:07

## 2022-07-02 RX ADMIN — FAMOTIDINE 20 MG: 10 INJECTION, SOLUTION INTRAVENOUS at 08:07

## 2022-07-02 RX ADMIN — AMIODARONE HYDROCHLORIDE 0.5 MG/MIN: 1.8 INJECTION, SOLUTION INTRAVENOUS at 02:07

## 2022-07-02 RX ADMIN — ACETAMINOPHEN 1000 MG: 500 TABLET ORAL at 10:07

## 2022-07-02 RX ADMIN — OXYCODONE HYDROCHLORIDE 10 MG: 10 TABLET ORAL at 01:07

## 2022-07-02 RX ADMIN — POLYETHYLENE GLYCOL 3350 17 G: 17 POWDER, FOR SOLUTION ORAL at 08:07

## 2022-07-02 RX ADMIN — OXYCODONE 5 MG: 5 TABLET ORAL at 02:07

## 2022-07-02 RX ADMIN — HEPARIN SODIUM 5000 UNITS: 5000 INJECTION INTRAVENOUS; SUBCUTANEOUS at 10:07

## 2022-07-02 RX ADMIN — OXYCODONE HYDROCHLORIDE 10 MG: 10 TABLET ORAL at 07:07

## 2022-07-02 RX ADMIN — ACETAMINOPHEN 1000 MG: 500 TABLET ORAL at 06:07

## 2022-07-02 RX ADMIN — ACETAMINOPHEN 1000 MG: 500 TABLET ORAL at 01:07

## 2022-07-02 RX ADMIN — FUROSEMIDE 40 MG: 10 INJECTION, SOLUTION INTRAMUSCULAR; INTRAVENOUS at 08:07

## 2022-07-02 NOTE — PLAN OF CARE
Problem: Physical Therapy  Goal: Physical Therapy Goal  Description: Goals to be met by: 7/15/2022    Patient will increase functional independence with mobility by performin. Supine to sit with CGA  2. Sit to stand transfer with Supervision  3. Gait  x 150 feet with Supervision   4. Ascend/descend 4 stair with no Handrails Contact Guard Assistance  5. Stand for 3 minutes with Supervision     Outcome: Ongoing, Progressing     Eval completed. Goals appropriate.

## 2022-07-02 NOTE — PLAN OF CARE
SICU PLAN OF CARE NOTE    Dx: Mitral regurgitation    Shift Events: No acute events throughout shift. Epi and amio gtt's off. 40 mg lasix IVP BID, UOP appropriate, see flowsheet for details.    Neuro: AAO x4    Cardiac: Paced rhythm, HR 60-80s    Respiratory: 2 L NC    GI: Clear Liquid    : Urinary Catheter 1390 cc/shift    Drains:     Meds CT: 90 mL/shift, serosanguinous  Pl CT: 120 mL/shift, serosanguinous     Labs/Accuchecks: Daily labs reviewed. Accuchecks ACHS.    Skin: Midsternal incision RAMU. Chest tube sites cleansed and dressing changed. Bed plugged in with mattress inflated. Heel and sacral foams in place. Weight shift assistance provided throughout shift.

## 2022-07-02 NOTE — PROGRESS NOTES
"[facility] - [facility]  Endocrinology  Progress Note    Admit Date: 6/30/2022     Reason for Consult: Management of Hyperglycemia     Surgical Procedure and Date:   6/30/22  Valvuloplasty, Mitral (N/A)  MAZE (N/A)  INSERTION, INTRA-AORTIC BALLOON PUMP (Right)       HPI:   Patient is a 72 y.o. male with a diagnosis of CAD, HTN, CHF, pAFib s/p DCCV (on eliquis), known MR, thyroid disease, sleep apnea (no CPAP), and GERD. Patient referred to CTS for worsening MV insufficiency from moderate to severe, associated with sx of orthopnea, VILLANUEVA, fatigue, and palpitations. Patient now presents for the above procedure(s). Endocrinology consulted for management of hyperglycemia.    Lab Results   Component Value Date    HGBA1C 5.8 (H) 06/28/2022           Interval HPI:   Overnight events: Remains in SICU. POD 2. BG well controlled with no prn SQ insulin requirements. Diet NPO Except for: Medication (per OGT)    Eating:   NPO  Nausea: No  Hypoglycemia and intervention: No  Fever: No  TPN and/or TF: No  If yes, type of TF/TPN and rate: n/a    /73   Pulse 65   Temp 97.8 °F (36.6 °C) (Oral)   Resp (!) 31   Ht 6' 1" (1.854 m)   Wt 108.9 kg (240 lb)   SpO2 96%   BMI 31.66 kg/m²     Labs Reviewed and Include    Recent Labs   Lab 07/02/22  0445   *   CALCIUM 8.3*   ALBUMIN 3.4*   PROT 5.0*      K 5.4*   CO2 26      BUN 29*   CREATININE 1.5*   ALKPHOS 35*   ALT 10   AST 42*   BILITOT 0.8     Lab Results   Component Value Date    WBC 11.70 07/02/2022    HGB 8.7 (L) 07/02/2022    HCT 28.5 (L) 07/02/2022    MCV 81 (L) 07/02/2022    PLT 55 (L) 07/02/2022     No results for input(s): TSH, FREET4 in the last 168 hours.  Lab Results   Component Value Date    HGBA1C 5.8 (H) 06/28/2022       Nutritional status:   Body mass index is 31.66 kg/m².  Lab Results   Component Value Date    ALBUMIN 3.4 (L) 07/02/2022    ALBUMIN 3.3 (L) 07/01/2022    ALBUMIN 3.4 (L) 07/01/2022     No results found for: " PREALBUMIN    Estimated Creatinine Clearance: 55.9 mL/min (A) (based on SCr of 1.5 mg/dL (H)).    Accu-Checks  Recent Labs     07/01/22  0558 07/01/22  0718 07/01/22  0820 07/01/22  0929 07/01/22  0956 07/01/22  1200 07/01/22  1611 07/01/22  2023 07/02/22  0042 07/02/22  0445   POCTGLUCOSE 116* 76 89 96 121* 148* 107 140* 143* 141*       Current Medications and/or Treatments Impacting Glycemic Control  Immunotherapy:    Immunosuppressants       None          Steroids:   Hormones (From admission, onward)                None          Pressors:    Autonomic Drugs (From admission, onward)                Start     Stop Route Frequency Ordered    06/30/22 1400  EPINEPHrine (ADRENALIN) 5 mg in dextrose 5 % 250 mL infusion        Question Answer Comment   Begin at (in mcg/kg/min): 0.02    Titrate by: (in mcg/kg/min) 0.02    Titrate interval: (in minutes) 15    Titrate to maintain: (SBP or MAP or Cardiac Index) MAP    Greater than: (in mmHg) 60    Maximum dose: (in mcg/kg/min) 0.1        -- IV Continuous 06/30/22 1250          Hyperglycemia/Diabetes Medications:   Antihyperglycemics (From admission, onward)                Start     Stop Route Frequency Ordered    07/01/22 1201  insulin aspart U-100 pen 1-10 Units         -- SubQ Every 4 hours PRN 07/01/22 1102            ASSESSMENT and PLAN    * Mitral regurgitation  Managed per primary team  Optimize BG control        Stress hyperglycemia  BG goal 140-180    Continue Moderate Dose Correction Scale  BG monitoring q 4 hrs while NPO    ** Please call Endocrine for any BG related issues **    Discharge plans: TBD      Paroxysmal atrial fibrillation  May increase insulin resistance.         S/P mitral valve repair  Managed per primary team  Optimize BG control          Eladia Sanders, NP  Endocrinology  [facility]

## 2022-07-02 NOTE — ASSESSMENT & PLAN NOTE
Mr. Ogden is a 72 year old male with a hx of spinal stenosis, bilateral carotid artery stenosis, paroxysmal Afib, and severe mitral regurgitation. Now s/p Mitral valve repair, MAZE, Left Atrial Appendage Resection, and placement of intra-aortic balloon pump on 6/30/22.      Neuro/Psych:   -- Sedation: none  -- Pain: PRN Oxy + Dilaudid  -- Scheduled Tylenol             Cards:   -- S/P Mitral Valve Repair, MAZE, Left Atrial Appendage Resection on 6/30/22  -- Wean epi as tolerated  -- Ventricular pacing wires. Backup paced  -- MAP 60-80  -- Cleviprex PRN  -- Aspirin 325mg tonight pending postoperative coags and chest tube output      Pulm:   -- Goal O2 sat > 90%  -- ABG PRN  -- Mediastinal chest tubes x2 and pleural chest tube x1 to suction  -- Daily CXR      Renal:  -- Keep reyes for strict I/O  -- Trend BUN/Cr   -- Lasix 40 BID      FEN / GI:   -- Replace lytes as needed  -- Nutrition: cardiac diet  -- GI ppx: famotidine  -- Bowel reg: miralax  -- 5% Albumin as needed for postoperative resuscitation      ID:   -- Tm: afebrile; WBC stable  -- Margy-op ancef      Heme/Onc:   -- H/H stable   -- Daily CBC  -- 700mL Cell saver given back  -- Post-op coags pending  -- Aspirin 325mg QD      Endo:   -- BG goal 140-180  -- Insulin gtt      PPx:   Feeding: cardiac  Analgesia/Sedation: none / PRN Oxy + Dilaudid  Thromboembolic prevention: SCDs  HOB >30: Yes  Stress Ulcer ppx: famotidine  Glucose control: Critical care goal 140-180 g/dl, ISS     Lines/Drains/Airway: CVC RIJ, Reyes, Chest tubes x 3, ventricular pacing wires, L radial A-line,       Dispo/Code Status/Palliative:   -- SICU / Full Code.

## 2022-07-02 NOTE — PROGRESS NOTES
[facility] - [facility] Intensive Care  Critical Care - Surgery  Progress Note    Patient Name: Artemio Ogden  MRN: [id number]  Admission Date: 6/30/2022  Hospital Length of Stay: 2 days  Code Status: Full Code  Attending Provider: Kurtis Machado MD  Primary Care Provider: Danna Lanza MD   Principal Problem: Mitral regurgitation    Subjective:     Hospital/ICU Course:  No notes on file    Interval History/Significant Events: No acute events overnight. Slight increase in oxygen requirements with increase in PA pressures. 20 mg lasix given with good response. Otherwise hemodynamically stable on 0.04 epi.    Follow-up For: Procedure(s) (LRB):  Valvuloplasty, Mitral (N/A)  MAZE (N/A)  INSERTION, INTRA-AORTIC BALLOON PUMP (Right)    Post-Operative Day: 2 Days Post-Op    Objective:     Vital Signs (Most Recent):  Temp: 99.1 °F (37.3 °C) (07/02/22 1100)  Pulse: 61 (07/02/22 1200)  Resp: (!) 24 (07/02/22 1200)  BP: (!) 119/57 (07/02/22 1000)  SpO2: 96 % (07/02/22 1200)   Vital Signs (24h Range):  Temp:  [97.8 °F (36.6 °C)-99.1 °F (37.3 °C)] 99.1 °F (37.3 °C)  Pulse:  [60-89] 61  Resp:  [13-38] 24  SpO2:  [91 %-100 %] 96 %  BP: ()/(50-73) 119/57  Arterial Line BP: ()/(40-63) 108/44     Weight: 108.9 kg (240 lb)  Body mass index is 31.66 kg/m².      Intake/Output Summary (Last 24 hours) at 7/2/2022 1216  Last data filed at 7/2/2022 1100  Gross per 24 hour   Intake 1596.04 ml   Output 2275 ml   Net -678.96 ml       Physical Exam  Vitals and nursing note reviewed.   Constitutional:       General: He is not in acute distress.  HENT:      Head: Normocephalic.   Eyes:      General: No scleral icterus.     Pupils: Pupils are equal, round, and reactive to light.   Neck:      Comments: RIJ CVC  Cardiovascular:      Rate and Rhythm: Normal rate.      Comments: Midline sternotomy incision with clean, dry dressing in place  Ventricular pacing wires  (2) mediastinal and (1) pleural chest tube to suction  Pulmonary:       Effort: Pulmonary effort is normal. No respiratory distress.   Abdominal:      General: Abdomen is flat. There is no distension.      Palpations: Abdomen is soft.   Genitourinary:     Comments: Talley  Musculoskeletal:         General: No swelling. Normal range of motion.      Comments: R femoral IABP site without hematoma.   Skin:     General: Skin is warm.      Capillary Refill: Capillary refill takes less than 2 seconds.      Coloration: Skin is not pale.   Neurological:      General: No focal deficit present.      Mental Status: He is alert and oriented to person, place, and time.       Vents:  Vent Mode: A/C (07/01/22 1509)  Ventilator Initiated: Yes (06/30/22 1326)  Set Rate: 24 BPM (07/01/22 1509)  Vt Set: 480 mL (07/01/22 1509)  PEEP/CPAP: 5 cmH20 (07/01/22 1509)  Oxygen Concentration (%): 40 (07/01/22 1730)  Peak Airway Pressure: 22 cmH2O (07/01/22 1509)  Plateau Pressure: 16 cmH20 (07/01/22 1509)  Total Ve: 11.9 mL (07/01/22 1509)  Negative Inspiratory Force (cm H2O): -32 (07/01/22 1730)  F/VT Ratio<105 (RSBI): (!) 27.27 (07/01/22 1730)    Lines/Drains/Airways       Central Venous Catheter Line  Duration             Pulmonary Artery Catheter Assessment  06/30/22 0725 2 days              Drain  Duration                  Chest Tube 06/30/22 1200 3 Right Pleural 19 Fr. 2 days         Urethral Catheter 06/30/22 0723 Straight-tip;Temperature probe;Non-latex 14 Fr. 2 days         Y Chest Tube 1 and 2 06/30/22 1200 1 Anterior Mediastinal 19 Fr. 2 Anterior Mediastinal 19 Fr. 2 days              Arterial Line  Duration             Arterial Line 06/30/22 0710 Left Radial 2 days              Peripheral Intravenous Line  Duration                  Peripheral IV - Single Lumen 14 G  Right Forearm -- days         Peripheral IV - Single Lumen 06/30/22 0622 18 G Distal;Left;Posterior Forearm 2 days                    Significant Labs:    CBC/Anemia Profile:  Recent Labs   Lab 06/30/22  1314 06/30/22  1316 07/01/22  0409  07/01/22  0702 07/01/22  0957 07/01/22  1617 07/02/22  0445   WBC 21.63*  --  10.45  --   --   --  11.70   HGB 13.0*  --  9.3*  --   --   --  8.7*   HCT 42.4   < > 30.3*   < > 25* 25* 28.5*   PLT 99*  --  71*  --   --   --  55*   MCV 84  --  82  --   --   --  81*   RDW 15.3*  --  15.7*  --   --   --  16.3*    < > = values in this interval not displayed.        Chemistries:  Recent Labs   Lab 06/30/22  1314 06/30/22  2035 07/01/22  0105 07/01/22  0409 07/02/22  0445    146* 144 144 139   K 3.6  3.6 4.3 3.9 3.9 5.4*   * 113* 110 109 109   CO2 18* 16* 17* 22* 26   BUN 18 22 24* 25* 29*   CREATININE 1.5* 1.8* 2.1* 1.9* 1.5*   CALCIUM 8.5* 8.2* 8.1* 8.3* 8.3*   ALBUMIN 2.7*  --  3.4* 3.3* 3.4*   PROT 5.0*  --  4.7* 4.5* 5.0*   BILITOT 1.0  --  0.5 0.4 0.8   ALKPHOS 45*  --  28* 29* 35*   ALT 19  --  11 12 10   AST 90*  --  63* 59* 42*   MG 2.6 2.7*  --  2.6 2.8*   PHOS 4.6*  --   --  2.0* 4.5       All pertinent labs within the past 24 hours have been reviewed.    Significant Imaging:  I have reviewed all pertinent imaging results/findings within the past 24 hours.    Assessment/Plan:     * Mitral regurgitation  Mr. Ogden is a 72 year old male with a hx of spinal stenosis, bilateral carotid artery stenosis, paroxysmal Afib, and severe mitral regurgitation. Now s/p Mitral valve repair, MAZE, Left Atrial Appendage Resection, and placement of intra-aortic balloon pump on 6/30/22.      Neuro/Psych:   -- Sedation: none  -- Pain: PRN Oxy + Dilaudid  -- Scheduled Tylenol             Cards:   -- S/P Mitral Valve Repair, MAZE, Left Atrial Appendage Resection on 6/30/22  -- Wean epi as tolerated  -- Ventricular pacing wires. Backup paced  -- MAP 60-80  -- Cleviprex PRN  -- Aspirin 325mg tonight pending postoperative coags and chest tube output      Pulm:   -- Goal O2 sat > 90%  -- ABG PRN  -- Mediastinal chest tubes x2 and pleural chest tube x1 to suction  -- Daily CXR      Renal:  -- Keep reyes for strict  I/O  -- Trend BUN/Cr   -- Lasix 40 BID      FEN / GI:   -- Replace lytes as needed  -- Nutrition: cardiac diet  -- GI ppx: famotidine  -- Bowel reg: miralax  -- 5% Albumin as needed for postoperative resuscitation      ID:   -- Tm: afebrile; WBC stable  -- Margy-op ancef      Heme/Onc:   -- H/H stable   -- Daily CBC  -- 700mL Cell saver given back  -- Post-op coags pending  -- Aspirin 325mg QD      Endo:   -- BG goal 140-180  -- Insulin gtt      PPx:   Feeding: cardiac  Analgesia/Sedation: none / PRN Oxy + Dilaudid  Thromboembolic prevention: SCDs  HOB >30: Yes  Stress Ulcer ppx: famotidine  Glucose control: Critical care goal 140-180 g/dl, ISS     Lines/Drains/Airway: CVC RIJ, Talley, Chest tubes x 3, ventricular pacing wires, L radial A-line,       Dispo/Code Status/Palliative:   -- SICU / Full Code.          Nuha Rashid MD  Critical Care - Surgery  [facility] - [facility] Intensive [facility]

## 2022-07-02 NOTE — PROGRESS NOTES
[facility] Progress Note     POD2 from MVr, MAZE, MAGGIE resection. IABP placement      Progressing     Cardiac diet, 1500cc fluid restrict  Increase activity  Pulmonary hygiene  Gentle diuresis    A. Fib ppx: Carvedilol  DVT ppx: SQH  Cont inpt    colonoscopy with emr

## 2022-07-02 NOTE — PT/OT/SLP EVAL
Occupational Therapy   Co-Evaluation    Name: Artemio Ogden  MRN: [id number]  Admitting Diagnosis:  Mitral regurgitation  Recent Surgery: Procedure(s) (LRB):  Valvuloplasty, Mitral (N/A)  MAZE (N/A)  INSERTION, INTRA-AORTIC BALLOON PUMP (Right) 2 Days Post-Op    Recommendations:     Discharge Recommendations: home health OT  Discharge Equipment Recommendations:  none  Barriers to discharge:  None    Assessment:     Artemio Ogden is a 72 y.o. male with a medical diagnosis of Mitral regurgitation.  He presents with MVR.  Was able to perform sit/stand T/F c min A and performed grooming tasks in standing.  Pt had c/o dizziness throughout assessment.  Able to take 3-5 steps c min A.  Pt is progressing well and was educated on sternal precautions. Performance deficits affecting function: weakness, impaired endurance, impaired self care skills, impaired functional mobilty, impaired balance, decreased upper extremity function.      Rehab Prognosis: Good; patient would benefit from acute skilled OT services to address these deficits and reach maximum level of function.       Plan:     Patient to be seen 5 x/week to address the above listed problems via self-care/home management, therapeutic exercises, therapeutic activities  · Plan of Care Expires: 07/16/22  · Plan of Care Reviewed with: patient   · Co-tx d/t pt medical complexity, decreased endurance, and to insure pt safety.    Subjective     Chief Complaint: Pt is s/p MVR and has sternal precautions  Patient/Family Comments/goals: To get better.    Occupational Profile:  Living Environment: Pt lives in a one story house c 4 LATHA and has no rails.    Previous level of function: I PTA  Equipment Used at Home:  none  Assistance upon Discharge: Pt lives c his wife    Pain/Comfort:  · Pain Rating 1: 0/10    Patients cultural, spiritual, Tenriism conflicts given the current situation: no    Objective:     Communicated with: RN prior to session.  Patient found up in chair  with arterial line, blood pressure cuff, central line, chest tube, reyes catheter, oxygen, peripheral IV, pulse ox (continuous), telemetry upon OT entry to room.    General Precautions: Standard, fall, sternal, [place] catheter (PA cordis)   Orthopedic Precautions:N/A   Braces: N/A  Respiratory Status: Nasal cannula, flow 2 L/min    Occupational Performance:    Functional Mobility/Transfers:  · Patient completed Sit <> Stand Transfer with minimum assistance  with  no assistive device   Functional Mobility: Pt was able to walk 3-5 steps c min A.  Limited by [place]    Activities of Daily Living:  · Grooming: contact guard assistance to brush teeth while standing at bedside table.  · Upper Body Dressing: maximal assistance to don hospital gown d/t IV lines.    Cognitive/Visual Perceptual:  Cognitive/Psychosocial Skills:     -       Oriented to: Person, Place, Time and Situation   -       Follows Commands/attention:Follows multistep  commands    Physical Exam:  Upper Extremity Range of Motion:     -       Right Upper Extremity: WFL  -       Left Upper Extremity: WFL  Upper Extremity Strength:    -       Right Upper Extremity: WFL  -       Left Upper Extremity: WFL    AMPAC 6 Click ADL:  AMPAC Total Score: 13  Education:    Patient left up in chair with all lines intact, call button in reach and RN notified    GOALS:   Multidisciplinary Problems     Occupational Therapy Goals        Problem: Occupational Therapy    Goal Priority Disciplines Outcome Interventions   Occupational Therapy Goal     OT, PT/OT Ongoing, Progressing    Description: Goals to be met by: 7/16/22     Patient will increase functional independence with ADLs by performing:    UE Dressing with [place].  LE Dressing with [place].  Grooming while standing at sink with [place].  Toileting from toilet with [place] for hygiene and clothing management.   Bathing from  shower chair/bench with [place].  Toilet transfer to toilet  with [place].  Increased functional strength to WFL for B UE.  Upper extremity exercise program x15 reps per handout, with assistance as needed.                     History:     Past Medical History:   Diagnosis Date    A-fib     Digestive disorder     reflux    Mitral regurgitation 5/31/2022    Sleep apnea     No CPAP    Thyroid disease        Past Surgical History:   Procedure Laterality Date    ABLATION N/A 6/30/2022    Procedure: MAZE;  Surgeon: Kurtis Machado MD;  Location: [address];  Service: Cardiothoracic;  Laterality: N/A;    CARDIAC SURGERY      AICD    EYE SURGERY Left 04/05/2012    Cataract     EYE SURGERY Left 02/14/2013    YAG Laser    EYE SURGERY Bilateral 01/14/2011    Lasik     HERNIA REPAIR  01/01/1967    INSERTION OF INTRA-AORTIC BALLOON ASSIST DEVICE Right 6/30/2022    Procedure: INSERTION, INTRA-AORTIC BALLOON PUMP;  Surgeon: Kurtis Machado MD;  Location: [address];  Service: Cardiothoracic;  Laterality: Right;    LEFT HEART CATHETERIZATION Left 6/2/2022    Procedure: CATHETERIZATION, HEART, LEFT;  Surgeon: Nikhil Lechuga III, MD;  [facility]: [facility] CATH;  Service: Cardiology;  Laterality: Left;    PROSTATE SURGERY  01/01/1999    THYROID LOBECTOMY  09/11/2006       Time Tracking:     OT Date of Treatment: 07/02/22  OT Start Time: 1040  OT Stop Time: 1100  OT Total Time (min): 20 min    Billable Minutes:Evaluation 10  Self Care/Home Management 10    7/2/2022

## 2022-07-02 NOTE — ASSESSMENT & PLAN NOTE
BG goal 140-180    Continue Moderate Dose Correction Scale  BG monitoring q 4 hrs while NPO    ** Please call Endocrine for any BG related issues **    Discharge plans: TBD

## 2022-07-02 NOTE — PT/OT/SLP EVAL
Physical Therapy Evaluation    Patient Name:  Artemio Ogden   MRN:  [id number]  Admit Date: 6/30/2022  Admitting Diagnosis:  Mitral regurgitation  Length of Stay: 2 days  Recent Surgery: Procedure(s) (LRB):  Valvuloplasty, Mitral (N/A)  MAZE (N/A)  INSERTION, INTRA-AORTIC BALLOON PUMP (Right) 2 Days Post-Op    Recommendations:     Discharge Recommendations:  home health PT   Discharge Equipment Recommendations: none   Barriers to discharge: None    Assessment:     Artemio Ogden is a 72 y.o. male admitted with a medical diagnosis of Mitral regurgitation.      Problem List: weakness, impaired endurance, impaired functional mobilty, gait instability, decreased upper extremity function, impaired cardiopulmonary response to activity  Rehab Prognosis: Good; patient would benefit from acute skilled PT services to address these deficits and reach maximum level of function.      Plan:     During this hospitalization, patient to be seen 5 x/week to address the identified rehab impairments via gait training, therapeutic activities, therapeutic exercises, neuromuscular re-education and progress towards the established goals.    · Plan of Care Expires:  07/30/22    Subjective   Communicated with RN prior to session.  Patient found up in chair upon PT entry to room, agreeable to evaluation. Artemio Ogden's alone during session.    Chief Complaint: No chief complaint on file.    Patient/Family Comments/goals: to get better and return home   Pain/Comfort:  · Pain Rating 1: 0/10  · Pain Rating Post-Intervention 1: 0/10    Living Environment:  Pt lives with wife in a [facility] with 4 LATHA and no HR. Pt was ind with ambulation and ADLs. Patient uses DME as follows: none. DME owned (not currently used): none.    Patient reports they will have assistance from wife upon discharge.    Objective:   Patient found with: telemetry, pulse ox (continuous), blood pressure cuff, central line, peripheral IV, reyes catheter, chest tube, arterial  "line, oxygen ([place] Catheter)     General Precautions: Standard, Cardiac fall, sternal   Orthopedic Precautions:N/A   Braces: N/A   Oxygen Device: Nasal Cannula   Vitals: BP (!) 119/57   Pulse 61   Temp 99.1 °F (37.3 °C) (Oral)   Resp (!) 24   Ht 6' 1" (1.854 m)   Wt 108.9 kg (240 lb)   SpO2 96%   BMI 31.66 kg/m²     Exams:  · Cognition:   · Alert and Cooperative  · Ox4  · Command following: Follows multistep  commands  · Fluency: clear/fluent    · RLE ROM: WFL  · RLE Strength: WFL  · LLE ROM: WFL  · LLE Strength: WFL    Outcome Measures:  AM-PAC 6 CLICK MOBILITY  Turning over in bed (including adjusting bedclothes, sheets and blankets)?: 2  Sitting down on and standing up from a chair with arms (e.g., wheelchair, bedside commode, etc.): 3  Moving from lying on back to sitting on the side of the bed?: 2  Moving to and from a bed to a chair (including a wheelchair)?: 3  Need to walk in hospital room?: 3  Climbing 3-5 steps with a railing?: 2  Basic Mobility Total Score: 15     Functional Mobility:  Additional staff present: OT  Bed Mobility:  Not performed 2nd to pt found in chair     Transfers:   · Sit <> Stand Transfer: minimum assistance with no assistive device from chair      Gait:   · Patient ambulated: 6 steps forward/backward   · Patient required: minimal assist  · Patient used: no assistive device  · Gait Pattern observed: reciprocal gait  · Gait Deviation(s): occasional unsteady gait, decreased step length, narrow base of support and decreased panfilo  · Impairments due to: posst surgical instability  · Comments:   · Verbal cuing for pacing, deep breathing and upright posture  · Verbal cuing for attention to task 2nd to pt distractible and with slow progressing today      Therapeutic Activities, Exercises, & Education:   Educated pt on PT role/POC  Educated pt on importance of OOB activity and daily ambulation   Educated pt on sternal precautions   Pt verbalized understanding     Pt performed " standing ADLs at bedside table with OT  VSS      Patient left up in chair with all lines intact, call button in reach and RN notified.    GOALS:   Multidisciplinary Problems     Physical Therapy Goals        Problem: Physical Therapy    Goal Priority Disciplines Outcome Goal Variances Interventions   Physical Therapy Goal     PT, PT/OT Ongoing, Progressing     Description: Goals to be met by: 7/15/2022    Patient will increase functional independence with mobility by performin. Supine to sit with CGA  2. Sit to stand transfer with Supervision  3. Gait  x 150 feet with Supervision   4. Ascend/descend 4 stair with no Handrails Contact Guard Assistance  5. Stand for 3 minutes with Supervision                      History:     Past Medical History:   Diagnosis Date    A-fib     Digestive disorder     reflux    Mitral regurgitation 2022    Sleep apnea     No CPAP    Thyroid disease        Past Surgical History:   Procedure Laterality Date    ABLATION N/A 2022    Procedure: MAZE;  Surgeon: Kurtis Machado MD;  Location: [facility] OR [address] [place] [address];  Service: Cardiothoracic;  Laterality: N/A;    CARDIAC SURGERY      AICD    EYE SURGERY Left 2012    Cataract     EYE SURGERY Left 2013    YAG Laser    EYE SURGERY Bilateral 2011    Lasik     HERNIA REPAIR  1967    INSERTION OF INTRA-AORTIC BALLOON ASSIST DEVICE Right 2022    Procedure: INSERTION, INTRA-AORTIC BALLOON PUMP;  Surgeon: Kurtis Machado MD;  Location: [facility] OR [address] [place] [address];  Service: Cardiothoracic;  Laterality: Right;    LEFT HEART CATHETERIZATION Left 2022    Procedure: CATHETERIZATION, HEART, LEFT;  Surgeon: Nikhil Lechuga III, MD;  Location: [facility] CATH;  Service: Cardiology;  Laterality: Left;    PROSTATE SURGERY  1999    THYROID LOBECTOMY  2006       Time Tracking:     PT Received On: 22  PT Start Time: 1042     PT Stop Time: 1102  PT Total Time (min): 20 min     Billable Minutes:  Evaluation 5 and Gait Training 8

## 2022-07-02 NOTE — SUBJECTIVE & OBJECTIVE
"Interval HPI:   Overnight events: Remains in SICU. POD 2. BG well controlled with no prn SQ insulin requirements. Diet NPO Except for: Medication (per OGT)    Eating:   NPO  Nausea: No  Hypoglycemia and intervention: No  Fever: No  TPN and/or TF: No  If yes, type of TF/TPN and rate: n/a    /73   Pulse 65   Temp 97.8 °F (36.6 °C) (Oral)   Resp (!) 31   Ht 6' 1" (1.854 m)   Wt 108.9 kg (240 lb)   SpO2 96%   BMI 31.66 kg/m²     Labs Reviewed and Include    Recent Labs   Lab 07/02/22  0445   *   CALCIUM 8.3*   ALBUMIN 3.4*   PROT 5.0*      K 5.4*   CO2 26      BUN 29*   CREATININE 1.5*   ALKPHOS 35*   ALT 10   AST 42*   BILITOT 0.8     Lab Results   Component Value Date    WBC 11.70 07/02/2022    HGB 8.7 (L) 07/02/2022    HCT 28.5 (L) 07/02/2022    MCV 81 (L) 07/02/2022    PLT 55 (L) 07/02/2022     No results for input(s): TSH, FREET4 in the last 168 hours.  Lab Results   Component Value Date    HGBA1C 5.8 (H) 06/28/2022       Nutritional status:   Body mass index is 31.66 kg/m².  Lab Results   Component Value Date    ALBUMIN 3.4 (L) 07/02/2022    ALBUMIN 3.3 (L) 07/01/2022    ALBUMIN 3.4 (L) 07/01/2022     No results found for: PREALBUMIN    Estimated Creatinine Clearance: 55.9 mL/min (A) (based on SCr of 1.5 mg/dL (H)).    Accu-Checks  Recent Labs     07/01/22  0558 07/01/22  0718 07/01/22  0820 07/01/22  0929 07/01/22  0956 07/01/22  1200 07/01/22  1611 07/01/22  2023 07/02/22  0042 07/02/22  0445   POCTGLUCOSE 116* 76 89 96 121* 148* 107 140* 143* 141*       Current Medications and/or Treatments Impacting Glycemic Control  Immunotherapy:    Immunosuppressants       None          Steroids:   Hormones (From admission, onward)                None          Pressors:    Autonomic Drugs (From admission, onward)                Start     Stop Route Frequency Ordered    06/30/22 1400  EPINEPHrine (ADRENALIN) 5 mg in dextrose 5 % 250 mL infusion        Question Answer Comment   Begin at (in " mcg/kg/min): 0.02    Titrate by: (in mcg/kg/min) 0.02    Titrate interval: (in minutes) 15    Titrate to maintain: (SBP or MAP or Cardiac Index) MAP    Greater than: (in mmHg) 60    Maximum dose: (in mcg/kg/min) 0.1        -- IV Continuous 06/30/22 1250          Hyperglycemia/Diabetes Medications:   Antihyperglycemics (From admission, onward)                Start     Stop Route Frequency Ordered    07/01/22 1201  insulin aspart U-100 pen 1-10 Units         -- SubQ Every 4 hours PRN 07/01/22 1102

## 2022-07-02 NOTE — PLAN OF CARE
"      SICU PLAN OF CARE NOTE    Dx: Mitral regurgitation    Shift Events: Epi weaned as tolerated.     Neuro: AAO x4, Follows Commands and Moves All Extremities    Cardiac: Paced rhythm. HR 60-80s. CVP 15,14,15    Vital Signs: /60 (BP Location: Right arm, Patient Position: Lying)   Pulse 78   Temp 98 °F (36.7 °C) (Oral)   Resp 15   Ht 6' 1" (1.854 m)   Wt 108.9 kg (240 lb)   SpO2 95%   BMI 31.66 kg/m²     Respiratory: Nasal Cannula    Diet: NPO    Gtts: Epinephrine and Amiodarone    Urine Output: Urinary Catheter 600 cc/shift    Drains: Chest Tube     Plueral CT:  90 ml/shift  Meds CT: 115 ML/shift       Labs/Accuchecks: Daily Labs, Q4hr accuchecks     Skin: No new breakdown noted. Specialty bed plugged in and working. Foams placed to heels and sacrum. Heels elevated off bed with green boots. Midsternal incision and chest tube dressings clean and intact.Frequent weight shifting assistance provided Q2hr to prevent breakdown.    PoC reviewed with pt/family. All questions and concerns addressed. VSS. See flowsheets for full assessment.       "

## 2022-07-02 NOTE — SUBJECTIVE & OBJECTIVE
Interval History/Significant Events: No acute events overnight. Slight increase in oxygen requirements with increase in PA pressures. 20 mg lasix given with good response. Otherwise hemodynamically stable on 0.04 epi.    Follow-up For: Procedure(s) (LRB):  Valvuloplasty, Mitral (N/A)  MAZE (N/A)  INSERTION, INTRA-AORTIC BALLOON PUMP (Right)    Post-Operative Day: 2 Days Post-Op    Objective:     Vital Signs (Most Recent):  Temp: 99.1 °F (37.3 °C) (07/02/22 1100)  Pulse: 61 (07/02/22 1200)  Resp: (!) 24 (07/02/22 1200)  BP: (!) 119/57 (07/02/22 1000)  SpO2: 96 % (07/02/22 1200)   Vital Signs (24h Range):  Temp:  [97.8 °F (36.6 °C)-99.1 °F (37.3 °C)] 99.1 °F (37.3 °C)  Pulse:  [60-89] 61  Resp:  [13-38] 24  SpO2:  [91 %-100 %] 96 %  BP: ()/(50-73) 119/57  Arterial Line BP: ()/(40-63) 108/44     Weight: 108.9 kg (240 lb)  Body mass index is 31.66 kg/m².      Intake/Output Summary (Last 24 hours) at 7/2/2022 1216  Last data filed at 7/2/2022 1100  Gross per 24 hour   Intake 1596.04 ml   Output 2275 ml   Net -678.96 ml       Physical Exam  Vitals and nursing note reviewed.   Constitutional:       General: He is not in acute distress.  HENT:      Head: Normocephalic.   Eyes:      General: No scleral icterus.     Pupils: Pupils are equal, round, and reactive to light.   Neck:      Comments: [facility] CVC  Cardiovascular:      Rate and Rhythm: Normal rate.      Comments: Midline sternotomy incision with clean, dry dressing in place  Ventricular pacing wires  (2) mediastinal and (1) pleural chest tube to suction  Pulmonary:      Effort: Pulmonary effort is normal. No respiratory distress.   Abdominal:      General: Abdomen is flat. There is no distension.      Palpations: Abdomen is soft.   Genitourinary:     Comments: Talley  Musculoskeletal:         General: No swelling. Normal range of motion.      Comments: R femoral IABP site without hematoma.   Skin:     General: Skin is warm.      Capillary Refill: Capillary  refill takes less than 2 seconds.      Coloration: Skin is not pale.   Neurological:      General: No focal deficit present.      Mental Status: He is alert and oriented to person, place, and time.       Vents:  Vent Mode: A/C (07/01/22 1509)  Ventilator Initiated: Yes (06/30/22 1326)  Set Rate: 24 BPM (07/01/22 1509)  Vt Set: 480 mL (07/01/22 1509)  PEEP/CPAP: 5 cmH20 (07/01/22 1509)  Oxygen Concentration (%): 40 (07/01/22 1730)  Peak Airway Pressure: 22 cmH2O (07/01/22 1509)  Plateau Pressure: 16 cmH20 (07/01/22 1509)  Total Ve: 11.9 mL (07/01/22 1509)  Negative Inspiratory Force (cm H2O): -32 (07/01/22 1730)  F/VT Ratio<105 (RSBI): (!) 27.27 (07/01/22 1730)    Lines/Drains/Airways       Central Venous Catheter Line  Duration             Pulmonary Artery Catheter Assessment  06/30/22 0725 2 days              Drain  Duration                  Chest Tube 06/30/22 1200 3 Right Pleural 19 Fr. 2 days         Urethral Catheter 06/30/22 0723 Straight-tip;Temperature probe;Non-latex 14 Fr. 2 days         Y Chest Tube 1 and 2 06/30/22 1200 1 Anterior Mediastinal 19 Fr. 2 Anterior Mediastinal 19 Fr. 2 days              Arterial Line  Duration             Arterial Line 06/30/22 0710 Left Radial 2 days              Peripheral Intravenous Line  Duration                  Peripheral IV - Single Lumen 14 G  Right Forearm -- days         Peripheral IV - Single Lumen 06/30/22 0622 18 G Distal;Left;Posterior Forearm 2 days                    Significant Labs:    CBC/Anemia Profile:  Recent Labs   Lab 06/30/22  1314 06/30/22  1316 07/01/22  0409 07/01/22  0702 07/01/22  0957 07/01/22  1617 07/02/22  0445   WBC 21.63*  --  10.45  --   --   --  11.70   HGB 13.0*  --  9.3*  --   --   --  8.7*   HCT 42.4   < > 30.3*   < > 25* 25* 28.5*   PLT 99*  --  71*  --   --   --  55*   MCV 84  --  82  --   --   --  81*   RDW 15.3*  --  15.7*  --   --   --  16.3*    < > = values in this interval not displayed.        Chemistries:  Recent Labs   Lab  06/30/22  1314 06/30/22  2035 07/01/22  0105 07/01/22  0409 07/02/22  0445    146* 144 144 139   K 3.6  3.6 4.3 3.9 3.9 5.4*   * 113* 110 109 109   CO2 18* 16* 17* 22* 26   BUN 18 22 24* 25* 29*   CREATININE 1.5* 1.8* 2.1* 1.9* 1.5*   CALCIUM 8.5* 8.2* 8.1* 8.3* 8.3*   ALBUMIN 2.7*  --  3.4* 3.3* 3.4*   PROT 5.0*  --  4.7* 4.5* 5.0*   BILITOT 1.0  --  0.5 0.4 0.8   ALKPHOS 45*  --  28* 29* 35*   ALT 19  --  11 12 10   AST 90*  --  63* 59* 42*   MG 2.6 2.7*  --  2.6 2.8*   PHOS 4.6*  --   --  2.0* 4.5       All pertinent labs within the past 24 hours have been reviewed.    Significant Imaging:  I have reviewed all pertinent imaging results/findings within the past 24 hours.

## 2022-07-03 LAB
ALBUMIN SERPL BCP-MCNC: 3.2 G/DL (ref 3.5–5.2)
ALBUMIN SERPL BCP-MCNC: 3.3 G/DL (ref 3.5–5.2)
ALP SERPL-CCNC: 44 U/L (ref 55–135)
ALP SERPL-CCNC: 46 U/L (ref 55–135)
ALT SERPL W/O P-5'-P-CCNC: 12 U/L (ref 10–44)
ALT SERPL W/O P-5'-P-CCNC: 12 U/L (ref 10–44)
ANION GAP SERPL CALC-SCNC: 8 MMOL/L (ref 8–16)
ANION GAP SERPL CALC-SCNC: 9 MMOL/L (ref 8–16)
AST SERPL-CCNC: 38 U/L (ref 10–40)
AST SERPL-CCNC: 44 U/L (ref 10–40)
BASOPHILS # BLD AUTO: 0.03 K/UL (ref 0–0.2)
BASOPHILS NFR BLD: 0.3 % (ref 0–1.9)
BILIRUB SERPL-MCNC: 1.3 MG/DL (ref 0.1–1)
BILIRUB SERPL-MCNC: 1.4 MG/DL (ref 0.1–1)
BUN SERPL-MCNC: 36 MG/DL (ref 8–23)
BUN SERPL-MCNC: 40 MG/DL (ref 8–23)
CALCIUM SERPL-MCNC: 8.3 MG/DL (ref 8.7–10.5)
CALCIUM SERPL-MCNC: 9.6 MG/DL (ref 8.7–10.5)
CHLORIDE SERPL-SCNC: 105 MMOL/L (ref 95–110)
CHLORIDE SERPL-SCNC: 106 MMOL/L (ref 95–110)
CO2 SERPL-SCNC: 25 MMOL/L (ref 23–29)
CO2 SERPL-SCNC: 27 MMOL/L (ref 23–29)
CREAT SERPL-MCNC: 1.5 MG/DL (ref 0.5–1.4)
CREAT SERPL-MCNC: 1.5 MG/DL (ref 0.5–1.4)
DIFFERENTIAL METHOD: ABNORMAL
EOSINOPHIL # BLD AUTO: 0.1 K/UL (ref 0–0.5)
EOSINOPHIL NFR BLD: 1.1 % (ref 0–8)
ERYTHROCYTE [DISTWIDTH] IN BLOOD BY AUTOMATED COUNT: 16.2 % (ref 11.5–14.5)
EST. GFR  (AFRICAN AMERICAN): 52.3 ML/MIN/1.73 M^2
EST. GFR  (AFRICAN AMERICAN): 52.3 ML/MIN/1.73 M^2
EST. GFR  (NON AFRICAN AMERICAN): 45.2 ML/MIN/1.73 M^2
EST. GFR  (NON AFRICAN AMERICAN): 45.2 ML/MIN/1.73 M^2
GLUCOSE SERPL-MCNC: 123 MG/DL (ref 70–110)
GLUCOSE SERPL-MCNC: 98 MG/DL (ref 70–110)
HCT VFR BLD AUTO: 29.1 % (ref 40–54)
HGB BLD-MCNC: 8.8 G/DL (ref 14–18)
IMM GRANULOCYTES # BLD AUTO: 0.08 K/UL (ref 0–0.04)
IMM GRANULOCYTES NFR BLD AUTO: 0.8 % (ref 0–0.5)
LYMPHOCYTES # BLD AUTO: 0.7 K/UL (ref 1–4.8)
LYMPHOCYTES NFR BLD: 7.1 % (ref 18–48)
MAGNESIUM SERPL-MCNC: 2.7 MG/DL (ref 1.6–2.6)
MCH RBC QN AUTO: 24.8 PG (ref 27–31)
MCHC RBC AUTO-ENTMCNC: 30.2 G/DL (ref 32–36)
MCV RBC AUTO: 82 FL (ref 82–98)
MONOCYTES # BLD AUTO: 0.8 K/UL (ref 0.3–1)
MONOCYTES NFR BLD: 8.1 % (ref 4–15)
NEUTROPHILS # BLD AUTO: 8.6 K/UL (ref 1.8–7.7)
NEUTROPHILS NFR BLD: 82.6 % (ref 38–73)
NRBC BLD-RTO: 0 /100 WBC
PHOSPHATE SERPL-MCNC: 4.4 MG/DL (ref 2.7–4.5)
PLATELET # BLD AUTO: 75 K/UL (ref 150–450)
PMV BLD AUTO: 12.4 FL (ref 9.2–12.9)
POCT GLUCOSE: 110 MG/DL (ref 70–110)
POCT GLUCOSE: 115 MG/DL (ref 70–110)
POCT GLUCOSE: 94 MG/DL (ref 70–110)
POCT GLUCOSE: 97 MG/DL (ref 70–110)
POTASSIUM SERPL-SCNC: 4.6 MMOL/L (ref 3.5–5.1)
POTASSIUM SERPL-SCNC: 5.5 MMOL/L (ref 3.5–5.1)
PROT SERPL-MCNC: 5.3 G/DL (ref 6–8.4)
PROT SERPL-MCNC: 5.3 G/DL (ref 6–8.4)
RBC # BLD AUTO: 3.55 M/UL (ref 4.6–6.2)
SODIUM SERPL-SCNC: 140 MMOL/L (ref 136–145)
SODIUM SERPL-SCNC: 140 MMOL/L (ref 136–145)
WBC # BLD AUTO: 10.37 K/UL (ref 3.9–12.7)

## 2022-07-03 PROCEDURE — 25000003 PHARM REV CODE 250: Performed by: THORACIC SURGERY (CARDIOTHORACIC VASCULAR SURGERY)

## 2022-07-03 PROCEDURE — 63600175 PHARM REV CODE 636 W HCPCS: Performed by: STUDENT IN AN ORGANIZED HEALTH CARE EDUCATION/TRAINING PROGRAM

## 2022-07-03 PROCEDURE — 85025 COMPLETE CBC W/AUTO DIFF WBC: CPT | Performed by: STUDENT IN AN ORGANIZED HEALTH CARE EDUCATION/TRAINING PROGRAM

## 2022-07-03 PROCEDURE — 99291 CRITICAL CARE FIRST HOUR: CPT | Mod: GC,,, | Performed by: ANESTHESIOLOGY

## 2022-07-03 PROCEDURE — 94761 N-INVAS EAR/PLS OXIMETRY MLT: CPT

## 2022-07-03 PROCEDURE — 25000003 PHARM REV CODE 250: Performed by: STUDENT IN AN ORGANIZED HEALTH CARE EDUCATION/TRAINING PROGRAM

## 2022-07-03 PROCEDURE — 99291 PR CRITICAL CARE, E/M 30-74 MINUTES: ICD-10-PCS | Mod: GC,,, | Performed by: ANESTHESIOLOGY

## 2022-07-03 PROCEDURE — 94640 AIRWAY INHALATION TREATMENT: CPT

## 2022-07-03 PROCEDURE — 94799 UNLISTED PULMONARY SVC/PX: CPT

## 2022-07-03 PROCEDURE — 20000000 HC ICU ROOM

## 2022-07-03 PROCEDURE — 83735 ASSAY OF MAGNESIUM: CPT | Performed by: THORACIC SURGERY (CARDIOTHORACIC VASCULAR SURGERY)

## 2022-07-03 PROCEDURE — 84100 ASSAY OF PHOSPHORUS: CPT | Performed by: THORACIC SURGERY (CARDIOTHORACIC VASCULAR SURGERY)

## 2022-07-03 PROCEDURE — 25000242 PHARM REV CODE 250 ALT 637 W/ HCPCS

## 2022-07-03 PROCEDURE — 27000221 HC OXYGEN, UP TO 24 HOURS

## 2022-07-03 PROCEDURE — 80053 COMPREHEN METABOLIC PANEL: CPT | Mod: 91

## 2022-07-03 PROCEDURE — 25000003 PHARM REV CODE 250

## 2022-07-03 PROCEDURE — 80053 COMPREHEN METABOLIC PANEL: CPT | Performed by: STUDENT IN AN ORGANIZED HEALTH CARE EDUCATION/TRAINING PROGRAM

## 2022-07-03 PROCEDURE — 63600175 PHARM REV CODE 636 W HCPCS

## 2022-07-03 PROCEDURE — 99900035 HC TECH TIME PER 15 MIN (STAT)

## 2022-07-03 RX ORDER — CALCIUM GLUCONATE 20 MG/ML
1 INJECTION, SOLUTION INTRAVENOUS ONCE
Status: COMPLETED | OUTPATIENT
Start: 2022-07-03 | End: 2022-07-03

## 2022-07-03 RX ORDER — CALCIUM GLUCONATE 20 MG/ML
1 INJECTION, SOLUTION INTRAVENOUS EVERY 10 MIN PRN
Status: DISCONTINUED | OUTPATIENT
Start: 2022-07-03 | End: 2022-07-06

## 2022-07-03 RX ORDER — ALBUTEROL SULFATE 2.5 MG/.5ML
10 SOLUTION RESPIRATORY (INHALATION) ONCE
Status: COMPLETED | OUTPATIENT
Start: 2022-07-03 | End: 2022-07-03

## 2022-07-03 RX ORDER — FAMOTIDINE 20 MG/1
20 TABLET, FILM COATED ORAL 2 TIMES DAILY
Status: DISCONTINUED | OUTPATIENT
Start: 2022-07-03 | End: 2022-07-07 | Stop reason: HOSPADM

## 2022-07-03 RX ADMIN — CALCIUM GLUCONATE 1 G: 20 INJECTION, SOLUTION INTRAVENOUS at 07:07

## 2022-07-03 RX ADMIN — FAMOTIDINE 20 MG: 20 TABLET ORAL at 08:07

## 2022-07-03 RX ADMIN — ALBUTEROL SULFATE 10 MG: 2.5 SOLUTION RESPIRATORY (INHALATION) at 07:07

## 2022-07-03 RX ADMIN — MUPIROCIN: 20 OINTMENT TOPICAL at 09:07

## 2022-07-03 RX ADMIN — HEPARIN SODIUM 5000 UNITS: 5000 INJECTION INTRAVENOUS; SUBCUTANEOUS at 09:07

## 2022-07-03 RX ADMIN — FUROSEMIDE 40 MG: 10 INJECTION, SOLUTION INTRAMUSCULAR; INTRAVENOUS at 08:07

## 2022-07-03 RX ADMIN — ACETAMINOPHEN 1000 MG: 500 TABLET ORAL at 05:07

## 2022-07-03 RX ADMIN — OXYCODONE HYDROCHLORIDE 10 MG: 10 TABLET ORAL at 12:07

## 2022-07-03 RX ADMIN — CARVEDILOL 3.12 MG: 3.12 TABLET, FILM COATED ORAL at 09:07

## 2022-07-03 RX ADMIN — FUROSEMIDE 40 MG: 10 INJECTION, SOLUTION INTRAMUSCULAR; INTRAVENOUS at 09:07

## 2022-07-03 RX ADMIN — MUPIROCIN: 20 OINTMENT TOPICAL at 08:07

## 2022-07-03 RX ADMIN — POLYETHYLENE GLYCOL 3350 17 G: 17 POWDER, FOR SOLUTION ORAL at 08:07

## 2022-07-03 RX ADMIN — FAMOTIDINE 20 MG: 20 TABLET ORAL at 09:07

## 2022-07-03 RX ADMIN — CARVEDILOL 3.12 MG: 3.12 TABLET, FILM COATED ORAL at 08:07

## 2022-07-03 RX ADMIN — LEVOTHYROXINE SODIUM 100 MCG: 100 TABLET ORAL at 05:07

## 2022-07-03 RX ADMIN — HEPARIN SODIUM 5000 UNITS: 5000 INJECTION INTRAVENOUS; SUBCUTANEOUS at 03:07

## 2022-07-03 RX ADMIN — HEPARIN SODIUM 5000 UNITS: 5000 INJECTION INTRAVENOUS; SUBCUTANEOUS at 05:07

## 2022-07-03 NOTE — PLAN OF CARE
SICU PLAN OF CARE NOTE    Dx: Mitral regurgitation    Shift Events: No acute events throughout shift. Meds CT removed. De-lined. Stepdown orders in place.    Neuro: AAO x4    Cardiac: Paced rhythm. HR 60-80s.    Respiratory: 2L NC    GI: Cardiac Diet with 1500 mL FR    : Voids Spontaneously 1225 cc/shift    Drains:     Pleural CT: 60 mL/shift, dark red     Labs/Accuchecks: Daily labs reviewed. Accuchecks ACHS.    Skin: Midsternal incision RAMU. Chest tube sites cleansed and dressing changed. Bed plugged in with mattress inflated. Heel and sacral foams in place. Weight shift assistance provided throughout shift.

## 2022-07-03 NOTE — SUBJECTIVE & OBJECTIVE
Interval History/Significant Events: No acute events overnight. Hemodynamically stable without inotropic support. Satting well on 4L.     Follow-up For: Procedure(s) (LRB):  Valvuloplasty, Mitral (N/A)  MAZE (N/A)  INSERTION, INTRA-AORTIC BALLOON PUMP (Right)    Post-Operative Day: 3 Days Post-Op    Objective:     Vital Signs (Most Recent):  Temp: 98.3 °F (36.8 °C) (07/03/22 0315)  Pulse: 60 (07/03/22 0600)  Resp: (!) 26 (07/03/22 0600)  BP: (!) 114/56 (07/03/22 0600)  SpO2: 99 % (07/03/22 0600)   Vital Signs (24h Range):  Temp:  [97.8 °F (36.6 °C)-99.1 °F (37.3 °C)] 98.3 °F (36.8 °C)  Pulse:  [60-77] 60  Resp:  [18-37] 26  SpO2:  [86 %-99 %] 99 %  BP: (108-127)/(56-72) 114/56  Arterial Line BP: ()/(40-64) 110/45     Weight: 108.8 kg (239 lb 13.8 oz)  Body mass index is 31.65 kg/m².      Intake/Output Summary (Last 24 hours) at 7/3/2022 0704  Last data filed at 7/3/2022 0600  Gross per 24 hour   Intake 748.14 ml   Output 3010 ml   Net -2261.86 ml       Physical Exam  Vitals and nursing note reviewed.   Constitutional:       General: He is not in acute distress.  HENT:      Head: Normocephalic.   Eyes:      General: No scleral icterus.     Pupils: Pupils are equal, round, and reactive to light.   Cardiovascular:      Rate and Rhythm: Normal rate.      Comments: Midline sternotomy incision with clean, dry dressing in place  Ventricular pacing wires  (2) mediastinal and (1) pleural chest tube to suction  Pulmonary:      Effort: Pulmonary effort is normal. No respiratory distress.   Abdominal:      General: Abdomen is flat. There is no distension.      Palpations: Abdomen is soft.   Genitourinary:     Comments: Talley  Musculoskeletal:         General: No swelling. Normal range of motion.      Comments: R femoral IABP site without hematoma.   Skin:     General: Skin is warm.      Capillary Refill: Capillary refill takes less than 2 seconds.      Coloration: Skin is not pale.   Neurological:      General: No focal  deficit present.      Mental Status: He is alert and oriented to person, place, and time.       Vents:  Vent Mode: A/C (07/01/22 1509)  Ventilator Initiated: Yes (06/30/22 1326)  Set Rate: 24 BPM (07/01/22 1509)  Vt Set: 480 mL (07/01/22 1509)  PEEP/CPAP: 5 cmH20 (07/01/22 1509)  Oxygen Concentration (%): 40 (07/01/22 1730)  Peak Airway Pressure: 22 cmH2O (07/01/22 1509)  Plateau Pressure: 16 cmH20 (07/01/22 1509)  Total Ve: 11.9 mL (07/01/22 1509)  Negative Inspiratory Force (cm H2O): -32 (07/01/22 1730)  F/VT Ratio<105 (RSBI): (!) 27.27 (07/01/22 1730)    Lines/Drains/Airways       Drain  Duration                  Chest Tube 06/30/22 1200 3 Right Pleural 19 Fr. 2 days         Urethral Catheter 06/30/22 0723 Straight-tip;Temperature probe;Non-latex 14 Fr. 2 days         Y Chest Tube 1 and 2 06/30/22 1200 1 Anterior Mediastinal 19 Fr. 2 Anterior Mediastinal 19 Fr. 2 days              Arterial Line  Duration             Arterial Line 06/30/22 0710 Left Radial 2 days              Peripheral Intravenous Line  Duration                  Peripheral IV - Single Lumen 14 G  Right Forearm -- days         Peripheral IV - Single Lumen 06/30/22 0622 18 G Distal;Left;Posterior Forearm 3 days                    Significant Labs:    CBC/Anemia Profile:  Recent Labs   Lab 07/01/22  1617 07/02/22 0445 07/03/22  0428   WBC  --  11.70 10.37   HGB  --  8.7* 8.8*   HCT 25* 28.5* 29.1*   PLT  --  55* 75*   MCV  --  81* 82   RDW  --  16.3* 16.2*        Chemistries:  Recent Labs   Lab 07/02/22  0445 07/03/22  0428    140   K 5.4* 5.5*    106   CO2 26 25   BUN 29* 36*   CREATININE 1.5* 1.5*   CALCIUM 8.3* 8.3*   ALBUMIN 3.4* 3.3*   PROT 5.0* 5.3*   BILITOT 0.8 1.3*   ALKPHOS 35* 46*   ALT 10 12   AST 42* 44*   MG 2.8* 2.7*   PHOS 4.5 4.4       ABGs:   Recent Labs   Lab 07/02/22  0512   PH 7.345*   PCO2 49.2*   HCO3 26.9   POCSATURATED 56*   BE 1     Lactic Acid: No results for input(s): LACTATE in the last 48  hours.    Significant Imaging:  I have reviewed all pertinent imaging results/findings within the past 24 hours.

## 2022-07-03 NOTE — ASSESSMENT & PLAN NOTE
Mr. Ogden is a 72 year old male with a hx of spinal stenosis, bilateral carotid artery stenosis, paroxysmal Afib, and severe mitral regurgitation. Now s/p Mitral valve repair, MAZE, Left Atrial Appendage Resection, and placement of intra-aortic balloon pump on 6/30/22.      Neuro/Psych:   -- Sedation: none  -- Pain: PRN Oxy + Dilaudid  -- Scheduled Tylenol             Cards:   -- S/P Mitral Valve Repair, MAZE, Left Atrial Appendage Resection on 6/30/22  -- Epi weaned off.  -- Ventricular pacing wires. Backup paced at 60  -- MAP 60-80  -- Cleviprex PRN  -- Aspirin 325mg tonight pending postoperative coags and chest tube output      Pulm:   -- Goal O2 sat > 90%  -- ABG PRN  -- Mediastinal chest tubes x2 and pleural chest tube x1 to suction  -- Daily CXR      Renal:  -- Keep reyes for strict I/O  -- Trend BUN/Cr   -- Lasix 40 BID      FEN / GI:   -- Replace lytes as needed  -- Nutrition: cardiac diet  -- GI ppx: famotidine  -- Bowel reg: miralax  -- 5% Albumin as needed for postoperative resuscitation      ID:   -- Tm: afebrile; WBC stable  -- Margy-op ancef      Heme/Onc:   -- H/H stable   -- Daily CBC  -- 700mL Cell saver given back  -- Post-op coags pending  -- Aspirin 325mg QD      Endo:   -- BG goal 140-180  -- Insulin gtt      PPx:   Feeding: cardiac  Analgesia/Sedation: none / PRN Oxy + Dilaudid  Thromboembolic prevention: SCDs  HOB >30: Yes  Stress Ulcer ppx: famotidine  Glucose control: Critical care goal 140-180 g/dl, ISS     Lines/Drains/Airway: CVC RIJ, Reyes, Chest tubes x 3, ventricular pacing wires, L radial A-line,       Dispo/Code Status/Palliative:   -- Possible step down today / Full Code.

## 2022-07-03 NOTE — PLAN OF CARE
"      SICU PLAN OF CARE NOTE    Dx: Mitral regurgitation    Shift Events: No acute events throughout shift.     Neuro: AAO x4, Follows Commands and Moves All Extremities    Cardiac: Paced rhythm. HR 60s    Vital Signs: /62   Pulse 60   Temp 98.5 °F (36.9 °C) (Oral)   Resp (!) 30   Ht 6' 1" (1.854 m)   Wt 108.9 kg (240 lb)   SpO2 (!) 93%   BMI 31.66 kg/m²     Respiratory: Nasal Cannula    Diet: Clear Liquid    Urine Output: Urinary Catheter 1310 cc/shift    Drains: Chest Tubes    Plueral CT: 60 mL  Meds CT: 30mL      Labs/Accuchecks: Daily Labs, Q4hr accuchecks      Skin: No new breakdown noted. Specialty bed plugged in and working. Foams placed to heels and sacrum. Heels elevated off bed with green boots. Midsternal incision and chest tube dressings clean and intact.Frequent weight shifting assistance provided Q2hr to prevent breakdown.     PoC reviewed with pt/family. All questions and concerns addressed. VSS. See flowsheets for full assessment.      "

## 2022-07-03 NOTE — PROGRESS NOTES
[facility] - Surgical Intensive Care  Critical Care - Surgery  Progress Note    Patient Name: Artemio Ogden  MRN: [id number]  Admission Date: 6/30/2022  Hospital Length of Stay: 3 days  Code Status: Full Code  Attending Provider: Kurtis Machado MD  Primary Care Provider: Danna Lanza MD   Principal Problem: Mitral regurgitation    Subjective:     Hospital/ICU Course:  No notes on file    Interval History/Significant Events: No acute events overnight. Hemodynamically stable without inotropic support. Satting well on 4L.     Follow-up For: Procedure(s) (LRB):  Valvuloplasty, Mitral (N/A)  MAZE (N/A)  INSERTION, INTRA-AORTIC BALLOON PUMP (Right)    Post-Operative Day: 3 Days Post-Op    Objective:     Vital Signs (Most Recent):  Temp: 98.3 °F (36.8 °C) (07/03/22 0315)  Pulse: 60 (07/03/22 0600)  Resp: (!) 26 (07/03/22 0600)  BP: (!) 114/56 (07/03/22 0600)  SpO2: 99 % (07/03/22 0600)   Vital Signs (24h Range):  Temp:  [97.8 °F (36.6 °C)-99.1 °F (37.3 °C)] 98.3 °F (36.8 °C)  Pulse:  [60-77] 60  Resp:  [18-37] 26  SpO2:  [86 %-99 %] 99 %  BP: (108-127)/(56-72) 114/56  Arterial Line BP: ()/(40-64) 110/45     Weight: 108.8 kg (239 lb 13.8 oz)  Body mass index is 31.65 kg/m².      Intake/Output Summary (Last 24 hours) at 7/3/2022 0704  Last data filed at 7/3/2022 0600  Gross per 24 hour   Intake 748.14 ml   Output 3010 ml   Net -2261.86 ml       Physical Exam  Vitals and nursing note reviewed.   Constitutional:       General: He is not in acute distress.  HENT:      Head: Normocephalic.   Eyes:      General: No scleral icterus.     Pupils: Pupils are equal, round, and reactive to light.   Cardiovascular:      Rate and Rhythm: Normal rate.      Comments: Midline sternotomy incision with clean, dry dressing in place  Ventricular pacing wires  (2) mediastinal and (1) pleural chest tube to suction  Pulmonary:      Effort: Pulmonary effort is normal. No respiratory distress.   Abdominal:      General: Abdomen is flat.  There is no distension.      Palpations: Abdomen is soft.   Genitourinary:     Comments: Talley  Musculoskeletal:         General: No swelling. Normal range of motion.      Comments: R femoral IABP site without hematoma.   Skin:     General: Skin is warm.      Capillary Refill: Capillary refill takes less than 2 seconds.      Coloration: Skin is not pale.   Neurological:      General: No focal deficit present.      Mental Status: He is alert and oriented to person, place, and time.       Vents:  Vent Mode: A/C (07/01/22 1509)  Ventilator Initiated: Yes (06/30/22 1326)  Set Rate: 24 BPM (07/01/22 1509)  Vt Set: 480 mL (07/01/22 1509)  PEEP/CPAP: 5 cmH20 (07/01/22 1509)  Oxygen Concentration (%): 40 (07/01/22 1730)  Peak Airway Pressure: 22 cmH2O (07/01/22 1509)  Plateau Pressure: 16 cmH20 (07/01/22 1509)  Total Ve: 11.9 mL (07/01/22 1509)  Negative Inspiratory Force (cm H2O): -32 (07/01/22 1730)  F/VT Ratio<105 (RSBI): (!) 27.27 (07/01/22 1730)    Lines/Drains/Airways       Drain  Duration                  Chest Tube 06/30/22 1200 3 Right Pleural 19 Fr. 2 days         Urethral Catheter 06/30/22 0723 Straight-tip;Temperature probe;Non-latex 14 Fr. 2 days         Y Chest Tube 1 and 2 06/30/22 1200 1 Anterior Mediastinal 19 Fr. 2 Anterior Mediastinal 19 Fr. 2 days              Arterial Line  Duration             Arterial Line 06/30/22 0710 Left Radial 2 days              Peripheral Intravenous Line  Duration                  Peripheral IV - Single Lumen 14 G  Right Forearm -- days         Peripheral IV - Single Lumen 06/30/22 0622 18 G Distal;Left;Posterior Forearm 3 days                    Significant Labs:    CBC/Anemia Profile:  Recent Labs   Lab 07/01/22  1617 07/02/22  0445 07/03/22  0428   WBC  --  11.70 10.37   HGB  --  8.7* 8.8*   HCT 25* 28.5* 29.1*   PLT  --  55* 75*   MCV  --  81* 82   RDW  --  16.3* 16.2*        Chemistries:  Recent Labs   Lab 07/02/22  0445 07/03/22  0428    140   K 5.4* 5.5*     106   CO2 26 25   BUN 29* 36*   CREATININE 1.5* 1.5*   CALCIUM 8.3* 8.3*   ALBUMIN 3.4* 3.3*   PROT 5.0* 5.3*   BILITOT 0.8 1.3*   ALKPHOS 35* 46*   ALT 10 12   AST 42* 44*   MG 2.8* 2.7*   PHOS 4.5 4.4       ABGs:   Recent Labs   Lab 07/02/22  0512   PH 7.345*   PCO2 49.2*   HCO3 26.9   POCSATURATED 56*   BE 1     Lactic Acid: No results for input(s): LACTATE in the last 48 hours.    Significant Imaging:  I have reviewed all pertinent imaging results/findings within the past 24 hours.    Assessment/Plan:     * Mitral regurgitation  Mr. Ogden is a 72 year old male with a hx of spinal stenosis, bilateral carotid artery stenosis, paroxysmal Afib, and severe mitral regurgitation. Now s/p Mitral valve repair, MAZE, Left Atrial Appendage Resection, and placement of intra-aortic balloon pump on 6/30/22.      Neuro/Psych:   -- Sedation: none  -- Pain: PRN Oxy + Dilaudid  -- Scheduled Tylenol             Cards:   -- S/P Mitral Valve Repair, MAZE, Left Atrial Appendage Resection on 6/30/22  -- Epi weaned off.  -- Ventricular pacing wires. Backup paced at 60  -- MAP 60-80  -- Cleviprex PRN  -- Aspirin 325mg tonight pending postoperative coags and chest tube output      Pulm:   -- Goal O2 sat > 90%  -- ABG PRN  -- Mediastinal chest tubes x2 and pleural chest tube x1 to suction  -- Daily CXR      Renal:  -- Keep reyes for strict I/O  -- Trend BUN/Cr   -- Lasix 40 BID      FEN / GI:   -- Replace lytes as needed  -- Nutrition: cardiac diet  -- GI ppx: famotidine  -- Bowel reg: miralax  -- 5% Albumin as needed for postoperative resuscitation      ID:   -- Tm: afebrile; WBC stable  -- Margy-op ancef      Heme/Onc:   -- H/H stable   -- Daily CBC  -- 700mL Cell saver given back  -- Post-op coags pending  -- Aspirin 325mg QD      Endo:   -- BG goal 140-180  -- Insulin gtt      PPx:   Feeding: cardiac  Analgesia/Sedation: none / PRN Oxy + Dilaudid  Thromboembolic prevention: SCDs  HOB >30: Yes  Stress Ulcer ppx:  famotidine  Glucose control: Critical care goal 140-180 g/dl, ISS     Lines/Drains/Airway: CVC RIJ, Talley, Chest tubes x 3, ventricular pacing wires, L radial A-line,       Dispo/Code Status/Palliative:   -- Possible step down today / Full Code.          Nuha Rashid MD  Critical Care - Surgery  [facility] - Surgical Intensive [facility]

## 2022-07-03 NOTE — CARE UPDATE
BG goal 140-180    -A1C   Lab Results   Component Value Date    HGBA1C 5.8 (H) 06/28/2022       -HOME REGIMEN: none; no hx of DM     -INPATIENT REGIMEN: correction scale    -GLUCOSE TREND FOR THE PAST 24HRS:     -NO HYPOGYCEMIAS NOTED     -Diet: Diet Cardiac Fluid - 1500mL    -Steroids -  none    -Tube Feeds - none      Remains in SICU. POD 3. BG well controlled with no prn SQ insulin requirements.       Plan:  -Continue Low Dose Correction Scale  -BG monitoring ac/hs     Discharge planning: likely no needs    Endocrine to continue to follow    ** Please call Endocrine for any BG related issues **      Endocrine will likely consider sign off soon if patient continues without insulin requirements.       ADDENDUM:   Endocrine to sign off at this time. Patient with no hx of DM and tolerating diet with no insulin needs. Please reconsult if needed.

## 2022-07-04 LAB
ALBUMIN SERPL BCP-MCNC: 3.1 G/DL (ref 3.5–5.2)
ALP SERPL-CCNC: 46 U/L (ref 55–135)
ALT SERPL W/O P-5'-P-CCNC: 16 U/L (ref 10–44)
ANION GAP SERPL CALC-SCNC: 10 MMOL/L (ref 8–16)
ANION GAP SERPL CALC-SCNC: 11 MMOL/L (ref 8–16)
AST SERPL-CCNC: 52 U/L (ref 10–40)
BASOPHILS # BLD AUTO: 0.05 K/UL (ref 0–0.2)
BASOPHILS NFR BLD: 0.5 % (ref 0–1.9)
BILIRUB SERPL-MCNC: 1.1 MG/DL (ref 0.1–1)
BUN SERPL-MCNC: 33 MG/DL (ref 8–23)
BUN SERPL-MCNC: 39 MG/DL (ref 8–23)
CALCIUM SERPL-MCNC: 8.3 MG/DL (ref 8.7–10.5)
CALCIUM SERPL-MCNC: 8.4 MG/DL (ref 8.7–10.5)
CHLORIDE SERPL-SCNC: 102 MMOL/L (ref 95–110)
CHLORIDE SERPL-SCNC: 102 MMOL/L (ref 95–110)
CO2 SERPL-SCNC: 28 MMOL/L (ref 23–29)
CO2 SERPL-SCNC: 29 MMOL/L (ref 23–29)
CREAT SERPL-MCNC: 1.2 MG/DL (ref 0.5–1.4)
CREAT SERPL-MCNC: 1.4 MG/DL (ref 0.5–1.4)
DIFFERENTIAL METHOD: ABNORMAL
EOSINOPHIL # BLD AUTO: 0.1 K/UL (ref 0–0.5)
EOSINOPHIL NFR BLD: 1.5 % (ref 0–8)
ERYTHROCYTE [DISTWIDTH] IN BLOOD BY AUTOMATED COUNT: 16.3 % (ref 11.5–14.5)
EST. GFR  (AFRICAN AMERICAN): 56.8 ML/MIN/1.73 M^2
EST. GFR  (AFRICAN AMERICAN): >60 ML/MIN/1.73 M^2
EST. GFR  (NON AFRICAN AMERICAN): 49.1 ML/MIN/1.73 M^2
EST. GFR  (NON AFRICAN AMERICAN): 59.2 ML/MIN/1.73 M^2
GLUCOSE SERPL-MCNC: 101 MG/DL (ref 70–110)
GLUCOSE SERPL-MCNC: 111 MG/DL (ref 70–110)
HCT VFR BLD AUTO: 28.7 % (ref 40–54)
HGB BLD-MCNC: 8.5 G/DL (ref 14–18)
IMM GRANULOCYTES # BLD AUTO: 0.06 K/UL (ref 0–0.04)
IMM GRANULOCYTES NFR BLD AUTO: 0.6 % (ref 0–0.5)
LYMPHOCYTES # BLD AUTO: 0.8 K/UL (ref 1–4.8)
LYMPHOCYTES NFR BLD: 8.3 % (ref 18–48)
MAGNESIUM SERPL-MCNC: 2.3 MG/DL (ref 1.6–2.6)
MAGNESIUM SERPL-MCNC: 2.4 MG/DL (ref 1.6–2.6)
MCH RBC QN AUTO: 24.8 PG (ref 27–31)
MCHC RBC AUTO-ENTMCNC: 29.6 G/DL (ref 32–36)
MCV RBC AUTO: 84 FL (ref 82–98)
MONOCYTES # BLD AUTO: 0.9 K/UL (ref 0.3–1)
MONOCYTES NFR BLD: 9.8 % (ref 4–15)
NEUTROPHILS # BLD AUTO: 7.6 K/UL (ref 1.8–7.7)
NEUTROPHILS NFR BLD: 79.3 % (ref 38–73)
NRBC BLD-RTO: 0 /100 WBC
PHOSPHATE SERPL-MCNC: 3.9 MG/DL (ref 2.7–4.5)
PHOSPHATE SERPL-MCNC: 4.2 MG/DL (ref 2.7–4.5)
PLATELET # BLD AUTO: 96 K/UL (ref 150–450)
PMV BLD AUTO: 11.5 FL (ref 9.2–12.9)
POTASSIUM SERPL-SCNC: 4.3 MMOL/L (ref 3.5–5.1)
POTASSIUM SERPL-SCNC: 4.6 MMOL/L (ref 3.5–5.1)
PROT SERPL-MCNC: 5.3 G/DL (ref 6–8.4)
RBC # BLD AUTO: 3.43 M/UL (ref 4.6–6.2)
SODIUM SERPL-SCNC: 141 MMOL/L (ref 136–145)
SODIUM SERPL-SCNC: 141 MMOL/L (ref 136–145)
WBC # BLD AUTO: 9.61 K/UL (ref 3.9–12.7)

## 2022-07-04 PROCEDURE — 20000000 HC ICU ROOM

## 2022-07-04 PROCEDURE — 83735 ASSAY OF MAGNESIUM: CPT | Mod: 91 | Performed by: THORACIC SURGERY (CARDIOTHORACIC VASCULAR SURGERY)

## 2022-07-04 PROCEDURE — 25000003 PHARM REV CODE 250

## 2022-07-04 PROCEDURE — 85025 COMPLETE CBC W/AUTO DIFF WBC: CPT | Performed by: STUDENT IN AN ORGANIZED HEALTH CARE EDUCATION/TRAINING PROGRAM

## 2022-07-04 PROCEDURE — 63600175 PHARM REV CODE 636 W HCPCS: Performed by: STUDENT IN AN ORGANIZED HEALTH CARE EDUCATION/TRAINING PROGRAM

## 2022-07-04 PROCEDURE — 80048 BASIC METABOLIC PNL TOTAL CA: CPT | Mod: XB | Performed by: THORACIC SURGERY (CARDIOTHORACIC VASCULAR SURGERY)

## 2022-07-04 PROCEDURE — 63600175 PHARM REV CODE 636 W HCPCS

## 2022-07-04 PROCEDURE — 25000003 PHARM REV CODE 250: Performed by: STUDENT IN AN ORGANIZED HEALTH CARE EDUCATION/TRAINING PROGRAM

## 2022-07-04 PROCEDURE — 94761 N-INVAS EAR/PLS OXIMETRY MLT: CPT

## 2022-07-04 PROCEDURE — 94799 UNLISTED PULMONARY SVC/PX: CPT

## 2022-07-04 PROCEDURE — 99291 CRITICAL CARE FIRST HOUR: CPT | Mod: GC,,, | Performed by: ANESTHESIOLOGY

## 2022-07-04 PROCEDURE — 80053 COMPREHEN METABOLIC PANEL: CPT | Performed by: STUDENT IN AN ORGANIZED HEALTH CARE EDUCATION/TRAINING PROGRAM

## 2022-07-04 PROCEDURE — 99291 PR CRITICAL CARE, E/M 30-74 MINUTES: ICD-10-PCS | Mod: GC,,, | Performed by: ANESTHESIOLOGY

## 2022-07-04 PROCEDURE — 99900035 HC TECH TIME PER 15 MIN (STAT)

## 2022-07-04 PROCEDURE — 27000221 HC OXYGEN, UP TO 24 HOURS

## 2022-07-04 PROCEDURE — 84100 ASSAY OF PHOSPHORUS: CPT | Mod: 91 | Performed by: THORACIC SURGERY (CARDIOTHORACIC VASCULAR SURGERY)

## 2022-07-04 RX ORDER — AMOXICILLIN 250 MG
1 CAPSULE ORAL 2 TIMES DAILY
Status: DISCONTINUED | OUTPATIENT
Start: 2022-07-04 | End: 2022-07-07 | Stop reason: HOSPADM

## 2022-07-04 RX ORDER — ASPIRIN 325 MG
325 TABLET ORAL DAILY
Status: DISCONTINUED | OUTPATIENT
Start: 2022-07-04 | End: 2022-07-07 | Stop reason: HOSPADM

## 2022-07-04 RX ADMIN — CARVEDILOL 3.12 MG: 3.12 TABLET, FILM COATED ORAL at 09:07

## 2022-07-04 RX ADMIN — LEVOTHYROXINE SODIUM 100 MCG: 100 TABLET ORAL at 05:07

## 2022-07-04 RX ADMIN — HEPARIN SODIUM 5000 UNITS: 5000 INJECTION INTRAVENOUS; SUBCUTANEOUS at 05:07

## 2022-07-04 RX ADMIN — FAMOTIDINE 20 MG: 20 TABLET ORAL at 09:07

## 2022-07-04 RX ADMIN — HEPARIN SODIUM 5000 UNITS: 5000 INJECTION INTRAVENOUS; SUBCUTANEOUS at 10:07

## 2022-07-04 RX ADMIN — ASPIRIN 325 MG ORAL TABLET 325 MG: 325 PILL ORAL at 09:07

## 2022-07-04 RX ADMIN — SENNOSIDES AND DOCUSATE SODIUM 1 TABLET: 50; 8.6 TABLET ORAL at 10:07

## 2022-07-04 RX ADMIN — FUROSEMIDE 40 MG: 10 INJECTION, SOLUTION INTRAMUSCULAR; INTRAVENOUS at 09:07

## 2022-07-04 RX ADMIN — MUPIROCIN: 20 OINTMENT TOPICAL at 10:07

## 2022-07-04 RX ADMIN — FUROSEMIDE 40 MG: 10 INJECTION, SOLUTION INTRAMUSCULAR; INTRAVENOUS at 10:07

## 2022-07-04 RX ADMIN — OXYCODONE 5 MG: 5 TABLET ORAL at 07:07

## 2022-07-04 RX ADMIN — FAMOTIDINE 20 MG: 20 TABLET ORAL at 10:07

## 2022-07-04 RX ADMIN — MUPIROCIN: 20 OINTMENT TOPICAL at 09:07

## 2022-07-04 RX ADMIN — OXYCODONE 5 MG: 5 TABLET ORAL at 06:07

## 2022-07-04 RX ADMIN — HEPARIN SODIUM 5000 UNITS: 5000 INJECTION INTRAVENOUS; SUBCUTANEOUS at 03:07

## 2022-07-04 NOTE — PROGRESS NOTES
[facility]  Critical Care - Surgery  Progress Note    Patient Name: Artemio Ogden  MRN: [id number]  Admission Date: 6/30/2022  Hospital Length of Stay: 4 days  Code Status: Full Code  Attending Provider: Kurtis Machado MD  Primary Care Provider: Danna Lanza MD   Principal Problem: Mitral regurgitation    Subjective:     Hospital/ICU Course:  No notes on file    Interval History/Significant Events: No acute events overnight. Hemodynamically stable without inotropic support. Satting well on 2L.    Follow-up For: Procedure(s) (LRB):  Valvuloplasty, Mitral (N/A)  MAZE (N/A)  INSERTION, INTRA-AORTIC BALLOON PUMP (Right)    Post-Operative Day: 4 Days Post-Op    Objective:     Vital Signs (Most Recent):  Temp: 98 °F (36.7 °C) (07/04/22 0301)  Pulse: 66 (07/04/22 0701)  Resp: 18 (07/04/22 0701)  BP: 124/60 (07/04/22 0400)  SpO2: 100 % (07/04/22 0701) Vital Signs (24h Range):  Temp:  [97.9 °F (36.6 °C)-98.6 °F (37 °C)] 98 °F (36.7 °C)  Pulse:  [60-70] 66  Resp:  [16-39] 18  SpO2:  [85 %-100 %] 100 %  BP: (103-133)/(56-77) 124/60  Arterial Line BP: (110-135)/(49-60) 114/54     Weight: 108.6 kg (239 lb 6.7 oz)  Body mass index is 31.59 kg/m².      Intake/Output Summary (Last 24 hours) at 7/4/2022 0714  Last data filed at 7/4/2022 0600  Gross per 24 hour   Intake 1267.65 ml   Output 3020 ml   Net -1752.35 ml       Physical Exam  Vitals and nursing note reviewed.   Constitutional:       General: He is not in acute distress.  HENT:      Head: Normocephalic and atraumatic.   Eyes:      General: No scleral icterus.     Pupils: Pupils are equal, round, and reactive to light.   Cardiovascular:      Rate and Rhythm: Normal rate.      Comments: Midline sternotomy incision with clean, dry dressing in place  (1) pleural chest tube to suction  Pulmonary:      Effort: Pulmonary effort is normal. No respiratory distress.   Abdominal:      General: Abdomen is flat. There is no distension.      Palpations:  Abdomen is soft.   Musculoskeletal:         General: No swelling. Normal range of motion.      Comments: R femoral IABP site without hematoma.   Skin:     General: Skin is warm.      Capillary Refill: Capillary refill takes less than 2 seconds.      Coloration: Skin is not pale.   Neurological:      General: No focal deficit present.      Mental Status: He is alert and oriented to person, place, and time.       Vents:  Vent Mode: A/C (07/01/22 1509)  Ventilator Initiated: Yes (06/30/22 1326)  Set Rate: 24 BPM (07/01/22 1509)  Vt Set: 480 mL (07/01/22 1509)  PEEP/CPAP: 5 cmH20 (07/01/22 1509)  Oxygen Concentration (%): 40 (07/01/22 1730)  Peak Airway Pressure: 22 cmH2O (07/01/22 1509)  Plateau Pressure: 16 cmH20 (07/01/22 1509)  Total Ve: 11.9 mL (07/01/22 1509)  Negative Inspiratory Force (cm H2O): -32 (07/01/22 1730)  F/VT Ratio<105 (RSBI): (!) 27.27 (07/01/22 1730)    Lines/Drains/Airways       Drain  Duration                  Chest Tube 06/30/22 1200 3 Right Pleural 19 Fr. 3 days              Peripheral Intravenous Line  Duration                  Peripheral IV - Single Lumen 14 G  Right Forearm -- days         Peripheral IV - Single Lumen 06/30/22 0622 18 G Distal;Left;Posterior Forearm 4 days         Peripheral IV - Single Lumen 07/03/22 1713 20 G Left Antecubital <1 day                    Significant Labs:    CBC/Anemia Profile:  Recent Labs   Lab 07/03/22  0428 07/04/22  0329   WBC 10.37 9.61   HGB 8.8* 8.5*   HCT 29.1* 28.7*   PLT 75* 96*   MCV 82 84   RDW 16.2* 16.3*        Chemistries:  Recent Labs   Lab 07/03/22  0428 07/03/22  1206 07/04/22  0329    140 141   K 5.5* 4.6 4.6    105 102   CO2 25 27 28   BUN 36* 40* 39*   CREATININE 1.5* 1.5* 1.4   CALCIUM 8.3* 9.6 8.4*   ALBUMIN 3.3* 3.2* 3.1*   PROT 5.3* 5.3* 5.3*   BILITOT 1.3* 1.4* 1.1*   ALKPHOS 46* 44* 46*   ALT 12 12 16   AST 44* 38 52*   MG 2.7*  --  2.4   PHOS 4.4  --  3.9       All pertinent labs within the past 24 hours have been  reviewed.    Significant Imaging:  I have reviewed all pertinent imaging results/findings within the past 24 hours.    Assessment/Plan:     * Mitral regurgitation  Mr. Ogden is a 72 year old male with a hx of spinal stenosis, bilateral carotid artery stenosis, paroxysmal Afib, and severe mitral regurgitation. Now s/p Mitral valve repair, MAZE, Left Atrial Appendage Resection, and placement of intra-aortic balloon pump on 6/30/22.      Neuro/Psych:   -- Sedation: none  -- Pain: PRN Oxy + Dilaudid  -- Scheduled Tylenol             Cards:   -- S/P Mitral Valve Repair, MAZE, Left Atrial Appendage Resection on 6/30/22  -- Epi weaned off.  -- Ventricular pacing wires. Backup paced at 60  -- MAP 60-80  -- Cleviprex PRN  -- Aspirin 325mg tonight pending postoperative coags and chest tube output      Pulm:   -- Goal O2 sat > 90%  -- ABG PRN  -- Pleural chest tube x1 to suction, plan to remove today  -- Daily CXR      Renal:  -- Keep reyes for strict I/O  -- Trend BUN/Cr   -- Lasix 40 BID      FEN / GI:   -- Replace lytes as needed  -- Nutrition: cardiac diet  -- GI ppx: famotidine  -- Bowel reg: miralax  -- 5% Albumin as needed for postoperative resuscitation      ID:   -- Tm: afebrile; WBC stable  -- Margy-op ancef      Heme/Onc:   -- H/H stable   -- Daily CBC  -- 700mL Cell saver given back  -- Post-op coags pending  -- Aspirin 325mg QD      Endo:   -- BG goal 140-180  -- Insulin gtt      PPx:   Feeding: cardiac  Analgesia/Sedation: none / PRN Oxy + Dilaudid  Thromboembolic prevention: SCDs  HOB >30: Yes  Stress Ulcer ppx: famotidine  Glucose control: Critical care goal 140-180 g/dl, ISS     Lines/Drains/Airway: Chest tubes x 1, ventricular pacing wires       Dispo/Code Status/Palliative:   -- Step down today / Full Code.            Nuha Rashid MD  Critical Care - Surgery  [facility]

## 2022-07-04 NOTE — PLAN OF CARE
"      SICU PLAN OF CARE NOTE    Dx: Mitral regurgitation    Shift Events: NAEON    Goals of Care: Increased mobility, diuresis, wean O2 to room air    Neuro: AAO x4, Follows Commands and Moves All Extremities    Vital Signs: /60 (BP Location: Right arm, Patient Position: Lying)   Pulse 61   Temp 98 °F (36.7 °C) (Oral)   Resp 16   Ht 6' 1" (1.854 m)   Wt 108.6 kg (239 lb 6.7 oz)   SpO2 97%   BMI 31.59 kg/m²     Respiratory: Nasal Cannula    Diet: Cardiac Diet        Urine Output: see flowsheet    Drains: Chest Tube, total output 20 cc /  shift         Labs/Accuchecks: Daily Labs    Skin: No breakdown noted during shift, patient able to reposition self in bed, patient OOBTC in AM, Bath completed, linen changed     " Headache

## 2022-07-04 NOTE — PLAN OF CARE
"SICU PLAN OF CARE NOTE    Dx: Mitral regurgitation    Goals of Care:  MAP >65    Vital Signs:  BP (!) 115/58   Pulse 77   Temp 98.6 °F (37 °C) (Oral)   Resp (!) 25   Ht 6' 1" (1.854 m)   Wt 108.6 kg (239 lb 6.7 oz)   SpO2 98%   BMI 31.59 kg/m²     Cardiac:  NSR, PPM in place, see notes/flowsheets for details    Resp:  SpO2 95% on 2L NC    Neuro:  AAO x4, Follows Commands and Moves All Extremities    Urine Output:  Voids Spontaneously    Drains:  Chest tube removed by MD    Diet:  Cardiac Diet     Labs/Accuchecks:  All labs trended reviewed and replaced accordingly.       "

## 2022-07-04 NOTE — SUBJECTIVE & OBJECTIVE
Interval History/Significant Events: No acute events overnight. Hemodynamically stable without inotropic support. Satting well on 2L.    Follow-up For: Procedure(s) (LRB):  Valvuloplasty, Mitral (N/A)  MAZE (N/A)  INSERTION, INTRA-AORTIC BALLOON PUMP (Right)    Post-Operative Day: 4 Days Post-Op    Objective:     Vital Signs (Most Recent):  Temp: 98 °F (36.7 °C) (07/04/22 0301)  Pulse: 66 (07/04/22 0701)  Resp: 18 (07/04/22 0701)  BP: 124/60 (07/04/22 0400)  SpO2: 100 % (07/04/22 0701) Vital Signs (24h Range):  Temp:  [97.9 °F (36.6 °C)-98.6 °F (37 °C)] 98 °F (36.7 °C)  Pulse:  [60-70] 66  Resp:  [16-39] 18  SpO2:  [85 %-100 %] 100 %  BP: (103-133)/(56-77) 124/60  Arterial Line BP: (110-135)/(49-60) 114/54     Weight: 108.6 kg (239 lb 6.7 oz)  Body mass index is 31.59 kg/m².      Intake/Output Summary (Last 24 hours) at 7/4/2022 0714  Last data filed at 7/4/2022 0600  Gross per 24 hour   Intake 1267.65 ml   Output 3020 ml   Net -1752.35 ml       Physical Exam  Vitals and nursing note reviewed.   Constitutional:       General: He is not in acute distress.  HENT:      Head: Normocephalic and atraumatic.   Eyes:      General: No scleral icterus.     Pupils: Pupils are equal, round, and reactive to light.   Cardiovascular:      Rate and Rhythm: Normal rate.      Comments: Midline sternotomy incision with clean, dry dressing in place  (1) pleural chest tube to suction  Pulmonary:      Effort: Pulmonary effort is normal. No respiratory distress.   Abdominal:      General: Abdomen is flat. There is no distension.      Palpations: Abdomen is soft.   Musculoskeletal:         General: No swelling. Normal range of motion.      Comments: R femoral IABP site without hematoma.   Skin:     General: Skin is warm.      Capillary Refill: Capillary refill takes less than 2 seconds.      Coloration: Skin is not pale.   Neurological:      General: No focal deficit present.      Mental Status: He is alert and oriented to person, place,  and time.       Vents:  Vent Mode: A/C (07/01/22 1509)  Ventilator Initiated: Yes (06/30/22 1326)  Set Rate: 24 BPM (07/01/22 1509)  Vt Set: 480 mL (07/01/22 1509)  PEEP/CPAP: 5 cmH20 (07/01/22 1509)  Oxygen Concentration (%): 40 (07/01/22 1730)  Peak Airway Pressure: 22 cmH2O (07/01/22 1509)  Plateau Pressure: 16 cmH20 (07/01/22 1509)  Total Ve: 11.9 mL (07/01/22 1509)  Negative Inspiratory Force (cm H2O): -32 (07/01/22 1730)  F/VT Ratio<105 (RSBI): (!) 27.27 (07/01/22 1730)    Lines/Drains/Airways       Drain  Duration                  Chest Tube 06/30/22 1200 3 Right Pleural 19 Fr. 3 days              Peripheral Intravenous Line  Duration                  Peripheral IV - Single Lumen 14 G  Right Forearm -- days         Peripheral IV - Single Lumen 06/30/22 0622 18 G Distal;Left;Posterior Forearm 4 days         Peripheral IV - Single Lumen 07/03/22 1713 20 G Left Antecubital <1 day                    Significant Labs:    CBC/Anemia Profile:  Recent Labs   Lab 07/03/22  0428 07/04/22  0329   WBC 10.37 9.61   HGB 8.8* 8.5*   HCT 29.1* 28.7*   PLT 75* 96*   MCV 82 84   RDW 16.2* 16.3*        Chemistries:  Recent Labs   Lab 07/03/22  0428 07/03/22  1206 07/04/22  0329    140 141   K 5.5* 4.6 4.6    105 102   CO2 25 27 28   BUN 36* 40* 39*   CREATININE 1.5* 1.5* 1.4   CALCIUM 8.3* 9.6 8.4*   ALBUMIN 3.3* 3.2* 3.1*   PROT 5.3* 5.3* 5.3*   BILITOT 1.3* 1.4* 1.1*   ALKPHOS 46* 44* 46*   ALT 12 12 16   AST 44* 38 52*   MG 2.7*  --  2.4   PHOS 4.4  --  3.9       All pertinent labs within the past 24 hours have been reviewed.    Significant Imaging:  I have reviewed all pertinent imaging results/findings within the past 24 hours.

## 2022-07-04 NOTE — ASSESSMENT & PLAN NOTE
Mr. Ogden is a 72 year old male with a hx of spinal stenosis, bilateral carotid artery stenosis, paroxysmal Afib, and severe mitral regurgitation. Now s/p Mitral valve repair, MAZE, Left Atrial Appendage Resection, and placement of intra-aortic balloon pump on 6/30/22.      Neuro/Psych:   -- Sedation: none  -- Pain: PRN Oxy + Dilaudid  -- Scheduled Tylenol             Cards:   -- S/P Mitral Valve Repair, MAZE, Left Atrial Appendage Resection on 6/30/22  -- Epi weaned off.  -- Ventricular pacing wires. Backup paced at 60  -- MAP 60-80  -- Cleviprex PRN  -- Aspirin 325mg tonight pending postoperative coags and chest tube output      Pulm:   -- Goal O2 sat > 90%  -- ABG PRN  -- Pleural chest tube x1 to suction, plan to remove today  -- Daily CXR      Renal:  -- Keep reyes for strict I/O  -- Trend BUN/Cr   -- Lasix 40 BID      FEN / GI:   -- Replace lytes as needed  -- Nutrition: cardiac diet  -- GI ppx: famotidine  -- Bowel reg: miralax  -- 5% Albumin as needed for postoperative resuscitation      ID:   -- Tm: afebrile; WBC stable  -- Margy-op ancef      Heme/Onc:   -- H/H stable   -- Daily CBC  -- 700mL Cell saver given back  -- Post-op coags pending  -- Aspirin 325mg QD      Endo:   -- BG goal 140-180  -- Insulin gtt      PPx:   Feeding: cardiac  Analgesia/Sedation: none / PRN Oxy + Dilaudid  Thromboembolic prevention: SCDs  HOB >30: Yes  Stress Ulcer ppx: famotidine  Glucose control: Critical care goal 140-180 g/dl, ISS     Lines/Drains/Airway: Chest tubes x 1, ventricular pacing wires       Dispo/Code Status/Palliative:   -- Step down today / Full Code.

## 2022-07-04 NOTE — PROGRESS NOTES
[facility] Progress Note     POD4 from MVr, MAZE, MAGGIE resection. IABP placement      Progressing     Cardiac diet, 1500cc fluid restrict  Increase activity  Pulmonary hygiene  A. Fib ppx: Carvedilol  DVT ppx: SQH  Okay to floor

## 2022-07-05 ENCOUNTER — TELEPHONE (OUTPATIENT)
Dept: CARDIOTHORACIC SURGERY | Facility: CLINIC | Age: 72
End: 2022-07-05
Payer: MEDICARE

## 2022-07-05 LAB
ALBUMIN SERPL BCP-MCNC: 3.1 G/DL (ref 3.5–5.2)
ALP SERPL-CCNC: 51 U/L (ref 55–135)
ALT SERPL W/O P-5'-P-CCNC: 22 U/L (ref 10–44)
ANION GAP SERPL CALC-SCNC: 9 MMOL/L (ref 8–16)
AST SERPL-CCNC: 53 U/L (ref 10–40)
BASOPHILS # BLD AUTO: 0.04 K/UL (ref 0–0.2)
BASOPHILS NFR BLD: 0.4 % (ref 0–1.9)
BILIRUB SERPL-MCNC: 1.2 MG/DL (ref 0.1–1)
BUN SERPL-MCNC: 33 MG/DL (ref 8–23)
CALCIUM SERPL-MCNC: 8.5 MG/DL (ref 8.7–10.5)
CHLORIDE SERPL-SCNC: 101 MMOL/L (ref 95–110)
CO2 SERPL-SCNC: 30 MMOL/L (ref 23–29)
CREAT SERPL-MCNC: 1.2 MG/DL (ref 0.5–1.4)
DIFFERENTIAL METHOD: ABNORMAL
EOSINOPHIL # BLD AUTO: 0.2 K/UL (ref 0–0.5)
EOSINOPHIL NFR BLD: 1.8 % (ref 0–8)
ERYTHROCYTE [DISTWIDTH] IN BLOOD BY AUTOMATED COUNT: 16.5 % (ref 11.5–14.5)
EST. GFR  (AFRICAN AMERICAN): >60 ML/MIN/1.73 M^2
EST. GFR  (NON AFRICAN AMERICAN): 59.2 ML/MIN/1.73 M^2
GLUCOSE SERPL-MCNC: 108 MG/DL (ref 70–110)
HCT VFR BLD AUTO: 31.9 % (ref 40–54)
HGB BLD-MCNC: 9.3 G/DL (ref 14–18)
IMM GRANULOCYTES # BLD AUTO: 0.08 K/UL (ref 0–0.04)
IMM GRANULOCYTES NFR BLD AUTO: 0.9 % (ref 0–0.5)
LYMPHOCYTES # BLD AUTO: 1 K/UL (ref 1–4.8)
LYMPHOCYTES NFR BLD: 10.8 % (ref 18–48)
MAGNESIUM SERPL-MCNC: 2.2 MG/DL (ref 1.6–2.6)
MCH RBC QN AUTO: 24.7 PG (ref 27–31)
MCHC RBC AUTO-ENTMCNC: 29.2 G/DL (ref 32–36)
MCV RBC AUTO: 85 FL (ref 82–98)
MONOCYTES # BLD AUTO: 1.1 K/UL (ref 0.3–1)
MONOCYTES NFR BLD: 11.9 % (ref 4–15)
NEUTROPHILS # BLD AUTO: 6.6 K/UL (ref 1.8–7.7)
NEUTROPHILS NFR BLD: 74.2 % (ref 38–73)
NRBC BLD-RTO: 0 /100 WBC
PHOSPHATE SERPL-MCNC: 4.2 MG/DL (ref 2.7–4.5)
PLATELET # BLD AUTO: 136 K/UL (ref 150–450)
PMV BLD AUTO: 10.6 FL (ref 9.2–12.9)
POCT GLUCOSE: 136 MG/DL (ref 70–110)
POTASSIUM SERPL-SCNC: 4.5 MMOL/L (ref 3.5–5.1)
PROT SERPL-MCNC: 5.5 G/DL (ref 6–8.4)
RBC # BLD AUTO: 3.76 M/UL (ref 4.6–6.2)
SODIUM SERPL-SCNC: 140 MMOL/L (ref 136–145)
WBC # BLD AUTO: 8.93 K/UL (ref 3.9–12.7)

## 2022-07-05 PROCEDURE — 27000646 HC AEROBIKA DEVICE

## 2022-07-05 PROCEDURE — 25000003 PHARM REV CODE 250: Performed by: STUDENT IN AN ORGANIZED HEALTH CARE EDUCATION/TRAINING PROGRAM

## 2022-07-05 PROCEDURE — 85025 COMPLETE CBC W/AUTO DIFF WBC: CPT | Performed by: STUDENT IN AN ORGANIZED HEALTH CARE EDUCATION/TRAINING PROGRAM

## 2022-07-05 PROCEDURE — 63600175 PHARM REV CODE 636 W HCPCS: Performed by: STUDENT IN AN ORGANIZED HEALTH CARE EDUCATION/TRAINING PROGRAM

## 2022-07-05 PROCEDURE — 99291 PR CRITICAL CARE, E/M 30-74 MINUTES: ICD-10-PCS | Mod: GC,,, | Performed by: ANESTHESIOLOGY

## 2022-07-05 PROCEDURE — 97535 SELF CARE MNGMENT TRAINING: CPT

## 2022-07-05 PROCEDURE — 99900035 HC TECH TIME PER 15 MIN (STAT)

## 2022-07-05 PROCEDURE — 97116 GAIT TRAINING THERAPY: CPT

## 2022-07-05 PROCEDURE — 84100 ASSAY OF PHOSPHORUS: CPT | Performed by: THORACIC SURGERY (CARDIOTHORACIC VASCULAR SURGERY)

## 2022-07-05 PROCEDURE — 94799 UNLISTED PULMONARY SVC/PX: CPT

## 2022-07-05 PROCEDURE — 63600175 PHARM REV CODE 636 W HCPCS

## 2022-07-05 PROCEDURE — 94761 N-INVAS EAR/PLS OXIMETRY MLT: CPT

## 2022-07-05 PROCEDURE — 25000003 PHARM REV CODE 250

## 2022-07-05 PROCEDURE — 94664 DEMO&/EVAL PT USE INHALER: CPT

## 2022-07-05 PROCEDURE — 99291 CRITICAL CARE FIRST HOUR: CPT | Mod: GC,,, | Performed by: ANESTHESIOLOGY

## 2022-07-05 PROCEDURE — 20600001 HC STEP DOWN PRIVATE ROOM

## 2022-07-05 PROCEDURE — 80053 COMPREHEN METABOLIC PANEL: CPT | Performed by: STUDENT IN AN ORGANIZED HEALTH CARE EDUCATION/TRAINING PROGRAM

## 2022-07-05 PROCEDURE — 83735 ASSAY OF MAGNESIUM: CPT | Performed by: THORACIC SURGERY (CARDIOTHORACIC VASCULAR SURGERY)

## 2022-07-05 PROCEDURE — 27000221 HC OXYGEN, UP TO 24 HOURS

## 2022-07-05 RX ORDER — GUAIFENESIN 600 MG/1
600 TABLET, EXTENDED RELEASE ORAL 2 TIMES DAILY
Status: DISCONTINUED | OUTPATIENT
Start: 2022-07-05 | End: 2022-07-07 | Stop reason: HOSPADM

## 2022-07-05 RX ADMIN — CARVEDILOL 3.12 MG: 3.12 TABLET, FILM COATED ORAL at 09:07

## 2022-07-05 RX ADMIN — HEPARIN SODIUM 5000 UNITS: 5000 INJECTION INTRAVENOUS; SUBCUTANEOUS at 05:07

## 2022-07-05 RX ADMIN — HEPARIN SODIUM 5000 UNITS: 5000 INJECTION INTRAVENOUS; SUBCUTANEOUS at 10:07

## 2022-07-05 RX ADMIN — SENNOSIDES AND DOCUSATE SODIUM 1 TABLET: 50; 8.6 TABLET ORAL at 09:07

## 2022-07-05 RX ADMIN — FUROSEMIDE 40 MG: 10 INJECTION, SOLUTION INTRAMUSCULAR; INTRAVENOUS at 09:07

## 2022-07-05 RX ADMIN — LEVOTHYROXINE SODIUM 100 MCG: 100 TABLET ORAL at 05:07

## 2022-07-05 RX ADMIN — FAMOTIDINE 20 MG: 20 TABLET ORAL at 09:07

## 2022-07-05 RX ADMIN — HEPARIN SODIUM 5000 UNITS: 5000 INJECTION INTRAVENOUS; SUBCUTANEOUS at 03:07

## 2022-07-05 RX ADMIN — OXYCODONE 5 MG: 5 TABLET ORAL at 09:07

## 2022-07-05 RX ADMIN — ASPIRIN 325 MG ORAL TABLET 325 MG: 325 PILL ORAL at 09:07

## 2022-07-05 RX ADMIN — GUAIFENESIN 600 MG: 600 TABLET, EXTENDED RELEASE ORAL at 09:07

## 2022-07-05 RX ADMIN — OXYCODONE HYDROCHLORIDE 10 MG: 10 TABLET ORAL at 12:07

## 2022-07-05 NOTE — PLAN OF CARE
[facility]  Discharge Reassessment    Primary Care Provider: Danna Lanza MD    Expected Discharge Date:     Reassessment (most recent)     Discharge Reassessment - 07/05/22 1519        Discharge Reassessment    Assessment Type Discharge Planning Reassessment     Did the patient's condition or plan change since previous assessment? Yes     Communicated JERO with patient/caregiver Date not available/Unable to determine     Discharge Plan A Rehab     Discharge Plan B Rehab     Why the patient remains in the hospital Requires continued medical care        Post-Acute Status    Post-Acute Placement Status Referrals Sent               Interval History/Significant Events: Remaining chest tube removed yesterday. No acute events overnight. Continues to be hemodynamic stable on 2L by NC.     Follow-up For: Procedure(s) (LRB):  Valvuloplasty, Mitral (N/A)  MAZE (N/A)  INSERTION, INTRA-AORTIC BALLOON PUMP (Right)     Post-Operative Day: 5 Days Post-Op

## 2022-07-05 NOTE — ASSESSMENT & PLAN NOTE
Mr. Ogden is a 72 year old male with a hx of spinal stenosis, bilateral carotid artery stenosis, paroxysmal Afib, and severe mitral regurgitation. Now s/p Mitral valve repair, MAZE, Left Atrial Appendage Resection, and placement of intra-aortic balloon pump on 6/30/22.      Neuro/Psych:   -- Sedation: none  -- Pain: PRN Oxy + Dilaudid  -- Scheduled Tylenol             Cards:   -- S/P Mitral Valve Repair, MAZE, Left Atrial Appendage Resection on 6/30/22  -- Epi weaned off.  -- Ventricular pacing wires. Backup paced at 60  -- MAP 60-80  -- Cleviprex PRN  -- Aspirin 325mg       Pulm:   -- Goal O2 sat > 90%  -- ABG PRN  -- Daily CXR      Renal:  -- Trend BUN/Cr   -- Lasix 40 BID      FEN / GI:   -- Replace lytes as needed  -- Nutrition: cardiac diet  -- GI ppx: famotidine  -- Bowel reg: miralax  -- 5% Albumin as needed for postoperative resuscitation      ID:   -- Tm: afebrile; WBC stable  -- Margy-op ancef      Heme/Onc:   -- H/H stable   -- Daily CBC  -- 700mL Cell saver given back  -- Post-op coags pending  -- Aspirin 325mg QD      Endo:   -- BG goal 140-180      PPx:   Feeding: cardiac  Analgesia/Sedation: none / PRN Oxy + Dilaudid  Thromboembolic prevention: SCDs, SQH  HOB >30: Yes  Stress Ulcer ppx: famotidine  Glucose control: Critical care goal 140-180 g/dl, ISS     Lines/Drains/Airway: PIV x1       Dispo/Code Status/Palliative:   -- Step down/ Full Code.

## 2022-07-05 NOTE — PHYSICIAN QUERY
PT Name: Artemio Ogden  MR #: [id number]     DOCUMENTATION CLARIFICATION     CDS/: Silvana Avila RN, CCDS              Contact information: [email or web address]  This form is a permanent document in the medical record.    Query Date: July 5, 2022    By submitting this query, we are merely seeking further clarification of documentation.  Please utilize your independent clinical judgment when addressing the question(s) below.    The Medical Record contains the following:   Indicator Supporting Clinical Findings Location in Medical Record    Kidney (Renal) Insufficiency      Kidney (Renal) Failure/Injury      Nephrotoxic Agents     X BUN/Creatinine           GFR Cr 1.5-->1.8-->2.1-->1.9-->1.5-->1.4-->1.2  gfr 45.2-->36.3-->30.1-->34.0-->4.2-->49.1-->59.2 6/30-7/5 lab    Urine: Casts         Eosinophils      Dehydration      Nausea/Vomiting      Dialysis/CRRT     X Treatment Trend BUN/Cr  7/1-7/5  cc note   X Other Progressing well with continued improvement in renal function  7/4- cc note       [facility] approved diagnostic criteria for acute kidney injury is based on KDIGO criteria:    An increase in serum creatinine > 0.3mg/dl within 48 hours  OR  Increase in serum creatinine to > 1.5x baseline, which is known or presumed to have occurred within the prior 7 days  OR  Urine volume <0.5 ml/kg/hr for 6 hours       The clinical guidelines noted above are only a system guideline. It does not replace the providers clinical judgment.     Provider, please specify the diagnosis or diagnoses associated with above clinical findings.     [  X  ] Unspecified Acute Kidney Failure/Injury     [    ] Other (please specify): _______________________________   [  ] Clinically Undetermined       Please document in your progress notes daily for the duration of treatment until resolved and include in your discharge summary.    References:   [facility] Clinical Practice Guideline for Acute Kidney Injury. (2012, March). Retrieved  October 21, 2020, from https://kdigo.org/wp-content/uploads/2016/10/OUFKR-6682-RYO-Guideline-English.pdf    WES Jiménez MD, FACP. (2015, Adele 15). Acute kidney injury revisited. Retrieved October 21, 2020, from https://acphospitalist.org/archives/2015/06/coding-acute-kidney-injury.htm      Form No. 26449

## 2022-07-05 NOTE — SUBJECTIVE & OBJECTIVE
Interval History/Significant Events: Remaining chest tube removed yesterday. No acute events overnight. Continues to be hemodynamic stable on 2L by NC.    Follow-up For: Procedure(s) (LRB):  Valvuloplasty, Mitral (N/A)  MAZE (N/A)  INSERTION, INTRA-AORTIC BALLOON PUMP (Right)    Post-Operative Day: 5 Days Post-Op    Objective:     Vital Signs (Most Recent):  Temp: 97.6 °F (36.4 °C) (07/05/22 0301)  Pulse: 79 (07/05/22 0500)  Resp: 20 (07/05/22 0500)  BP: 105/65 (07/05/22 0400)  SpO2: (!) 93 % (07/05/22 0500)   Vital Signs (24h Range):  Temp:  [97.6 °F (36.4 °C)-98.6 °F (37 °C)] 97.6 °F (36.4 °C)  Pulse:  [60-82] 79  Resp:  [14-30] 20  SpO2:  [89 %-100 %] 93 %  BP: ()/(55-89) 105/65     Weight: 108.4 kg (238 lb 15.7 oz)  Body mass index is 31.53 kg/m².      Intake/Output Summary (Last 24 hours) at 7/5/2022 0620  Last data filed at 7/5/2022 0548  Gross per 24 hour   Intake 1420 ml   Output 1765 ml   Net -345 ml       Physical Exam  Vitals and nursing note reviewed.   Constitutional:       General: He is not in acute distress.  HENT:      Head: Normocephalic and atraumatic.   Eyes:      General: No scleral icterus.     Pupils: Pupils are equal, round, and reactive to light.   Cardiovascular:      Rate and Rhythm: Normal rate.      Comments: Midline sternotomy incision with clean, dry dressing in place  Pulmonary:      Effort: Pulmonary effort is normal. No respiratory distress.      Comments: 2L by NC  Abdominal:      General: Abdomen is flat. There is no distension.      Palpations: Abdomen is soft.   Musculoskeletal:         General: No swelling. Normal range of motion.      Comments: R femoral IABP site without hematoma.   Skin:     General: Skin is warm.      Capillary Refill: Capillary refill takes less than 2 seconds.      Coloration: Skin is not pale.   Neurological:      General: No focal deficit present.      Mental Status: He is alert and oriented to person, place, and time.     Lines/Drains/Airways        Peripheral Intravenous Line  Duration                  Peripheral IV - Single Lumen 07/03/22 1713 20 G Left Antecubital 1 day                    Significant Labs:    CBC/Anemia Profile:  Recent Labs   Lab 07/04/22 0329 07/05/22  0518   WBC 9.61 8.93   HGB 8.5* 9.3*   HCT 28.7* 31.9*   PLT 96* 136*   MCV 84 85   RDW 16.3* 16.5*        Chemistries:  Recent Labs   Lab 07/03/22  1206 07/04/22 0329 07/04/22 2206 07/05/22 0518    141 141 140   K 4.6 4.6 4.3 4.5    102 102 101   CO2 27 28 29 30*   BUN 40* 39* 33* 33*   CREATININE 1.5* 1.4 1.2 1.2   CALCIUM 9.6 8.4* 8.3* 8.5*   ALBUMIN 3.2* 3.1*  --  3.1*   PROT 5.3* 5.3*  --  5.5*   BILITOT 1.4* 1.1*  --  1.2*   ALKPHOS 44* 46*  --  51*   ALT 12 16  --  22   AST 38 52*  --  53*   MG  --  2.4 2.3 2.2   PHOS  --  3.9 4.2 4.2       All pertinent labs within the past 24 hours have been reviewed.    Significant Imaging:  I have reviewed all pertinent imaging results/findings within the past 24 hours.

## 2022-07-05 NOTE — PT/OT/SLP PROGRESS
Occupational Therapy    CoTreatment & Updated D/C Rec    Name: Artemio Ogden  MRN: [id number]  Admitting Diagnosis:  Mitral regurgitation  5 Days Post-Op    Recommendations:     Discharge Recommendations: rehabilitation facility  Discharge Equipment Recommendations:  none  Barriers to discharge:  Decreased caregiver support, Inaccessible home environment    Assessment:     Artemio Ogden is a 72 y.o. male with a medical diagnosis of Mitral regurgitation.  Pt presents with tolerance for therapy on this date and was motivated to participate in therapy. Pt tolerated a variety of tasks and positions with VSS but fatigued after ~10minutes of functional mobility and standing ADLs requiring Mod A x2 to return to chair. Pt demonstrated difficulty with fine motor skills with LUE d/t swelling and decreased strength. Anticipate pt will progress well with continued OT services to address functional endurance and strength but based on skilled observation in therapy on this date, pt would benefit from further OT services in a rehabilitation facility to address functional mobility, strength, endurance, and self-care needs.    Performance deficits affecting function are weakness, impaired endurance, impaired self care skills, impaired functional mobilty, gait instability, decreased coordination, decreased upper extremity function, decreased lower extremity function, decreased safety awareness, impaired cardiopulmonary response to activity.     [place] completed to optimize functional safety and performance d/t decreased activity tolerance in the setting of the ICU.    Rehab Prognosis:  Good; patient would benefit from acute skilled OT services to address these deficits and reach maximum level of function.       Plan:     Patient to be seen 5 x/week to address the above listed problems via self-care/home management, therapeutic activities, therapeutic exercises  · Plan of Care Expires: 07/16/22  · Plan of Care Reviewed with:  "patient    Subjective   "I feel out of sorts"  Pain/Comfort:  · Pain Rating 1: 0/10    Objective:     Communicated with: Nsg prior to session.  Patient found up in chair with peripheral IV, pulse ox (continuous), telemetry, blood pressure cuff, oxygen upon OT entry to room.    General Precautions: Standard, sternal, fall   Orthopedic Precautions:N/A   Braces:    Respiratory Status: Nasal cannula, flow 2 L/min     Occupational Performance:     Functional Mobility/Transfers:  · Patient completed Sit <> Stand Transfer with minimum assistance and of 2 persons  with  no assistive device   · Functional Mobility: Pt completed sit<>stand from chair with Min A x2 for support. Pt performed functional mobility with Min A x1 to the counter and bathroom to retrieve self-care items for standing ADLs at the sink with CGA with progression to Min A with UE support on sink. Pt tolerated ~10minutes of standing ADLs with a decrease in color but VSS and waxy appearance. Pt completed functional mobility to chair with Mpd A x2 d/t fatigue and SOB. Pt SpO2 95% when returned to chair following functional task completion.     Activities of Daily Living:  · Grooming: CGA with progression to Min A for standing at the sink for face washing and oral hygiene. Pt completed all task within appropriate sequence and time, but demonstrated difficulty with mouth closure for oral care. Pt demonstrated decreased L hand strength, requiring A for squeezing toothpaste tube. Pt retrieved self-care items from counter in order to challenge his functional mobility and multi-tasking ability. Pt requiring Min A for functional mobility and was unable to carry self-care items during task d/t gait instability and decreased endurance.       [facility] 6 Click ADL: 17    Treatment & Education:  Pt educated on OT POC, purpose of OT services and safety with ADLs and functional mobility.   Pt educated on:  - Importance and benefit of OOB activity  - Modified self-care " techniques (light UE support on sink during standing ADLs) for grooming/hygiene  - Sternal precautions (pt reported 0/3 sternal precautions; pt educated by OT&PT on sternal precautions and demonstrated appropriate task completion within sternal precautions)  - Pt advocacy for care  - Energy Conservation techniques (pursed-lip breathing, taking rest breaks prn)    Patient left up in chair with all lines intact, call button in reach, chair alarm off and nsg notifiedEducation:      GOALS:   Multidisciplinary Problems     Occupational Therapy Goals        Problem: Occupational Therapy    Goal Priority Disciplines Outcome Interventions   Occupational Therapy Goal     OT, PT/OT Ongoing, Progressing    Description: Goals to be met by: 7/16/22     Patient will increase functional independence with ADLs by performing:    UE Dressing with [place].  LE Dressing with [place].  Grooming while standing at sink with [place].  Toileting from toilet with [place] for hygiene and clothing management.   Bathing from  shower chair/bench with [place].  Toilet transfer to toilet with [place].  Increased functional strength to WFL for B UE.  Upper extremity exercise program x15 reps per handout, with assistance as needed.                     Time Tracking:     OT Date of Treatment: 07/05/22  OT Start Time: 0752  OT Stop Time: 0810  OT Total Time (min): 18 min    Billable Minutes:Self Care/Home Management 18               7/5/2022

## 2022-07-05 NOTE — PROGRESS NOTES
[facility] - Surgical Intensive Care  Critical Care - Surgery  Progress Note    Patient Name: Artemio Ogden  MRN: [id number]  Admission Date: 6/30/2022  Hospital Length of Stay: 5 days  Code Status: Full Code  Attending Provider: Kurtis Machado MD  Primary Care Provider: Danna Lanza MD   Principal Problem: Mitral regurgitation    Subjective:     Hospital/ICU Course:  No notes on file    Interval History/Significant Events: Remaining chest tube removed yesterday. No acute events overnight. Continues to be hemodynamic stable on 2L by NC.    Follow-up For: Procedure(s) (LRB):  Valvuloplasty, Mitral (N/A)  MAZE (N/A)  INSERTION, INTRA-AORTIC BALLOON PUMP (Right)    Post-Operative Day: 5 Days Post-Op    Objective:     Vital Signs (Most Recent):  Temp: 97.6 °F (36.4 °C) (07/05/22 0301)  Pulse: 79 (07/05/22 0500)  Resp: 20 (07/05/22 0500)  BP: 105/65 (07/05/22 0400)  SpO2: (!) 93 % (07/05/22 0500)   Vital Signs (24h Range):  Temp:  [97.6 °F (36.4 °C)-98.6 °F (37 °C)] 97.6 °F (36.4 °C)  Pulse:  [60-82] 79  Resp:  [14-30] 20  SpO2:  [89 %-100 %] 93 %  BP: ()/(55-89) 105/65     Weight: 108.4 kg (238 lb 15.7 oz)  Body mass index is 31.53 kg/m².      Intake/Output Summary (Last 24 hours) at 7/5/2022 0620  Last data filed at 7/5/2022 0548  Gross per 24 hour   Intake 1420 ml   Output 1765 ml   Net -345 ml       Physical Exam  Vitals and nursing note reviewed.   Constitutional:       General: He is not in acute distress.  HENT:      Head: Normocephalic and atraumatic.   Eyes:      General: No scleral icterus.     Pupils: Pupils are equal, round, and reactive to light.   Cardiovascular:      Rate and Rhythm: Normal rate.      Comments: Midline sternotomy incision with clean, dry dressing in place  Pulmonary:      Effort: Pulmonary effort is normal. No respiratory distress.      Comments: 2L by NC  Abdominal:      General: Abdomen is flat. There is no distension.      Palpations: Abdomen is soft.   Musculoskeletal:          General: No swelling. Normal range of motion.      Comments: R femoral IABP site without hematoma.   Skin:     General: Skin is warm.      Capillary Refill: Capillary refill takes less than 2 seconds.      Coloration: Skin is not pale.   Neurological:      General: No focal deficit present.      Mental Status: He is alert and oriented to person, place, and time.     Lines/Drains/Airways       Peripheral Intravenous Line  Duration                  Peripheral IV - Single Lumen 07/03/22 1713 20 G Left Antecubital 1 day                    Significant Labs:    CBC/Anemia Profile:  Recent Labs   Lab 07/04/22  0329 07/05/22  0518   WBC 9.61 8.93   HGB 8.5* 9.3*   HCT 28.7* 31.9*   PLT 96* 136*   MCV 84 85   RDW 16.3* 16.5*        Chemistries:  Recent Labs   Lab 07/03/22  1206 07/04/22  0329 07/04/22 2206 07/05/22  0518    141 141 140   K 4.6 4.6 4.3 4.5    102 102 101   CO2 27 28 29 30*   BUN 40* 39* 33* 33*   CREATININE 1.5* 1.4 1.2 1.2   CALCIUM 9.6 8.4* 8.3* 8.5*   ALBUMIN 3.2* 3.1*  --  3.1*   PROT 5.3* 5.3*  --  5.5*   BILITOT 1.4* 1.1*  --  1.2*   ALKPHOS 44* 46*  --  51*   ALT 12 16  --  22   AST 38 52*  --  53*   MG  --  2.4 2.3 2.2   PHOS  --  3.9 4.2 4.2       All pertinent labs within the past 24 hours have been reviewed.    Significant Imaging:  I have reviewed all pertinent imaging results/findings within the past 24 hours.    Assessment/Plan:     * Mitral regurgitation  Mr. Ogden is a 72 year old male with a hx of spinal stenosis, bilateral carotid artery stenosis, paroxysmal Afib, and severe mitral regurgitation. Now s/p Mitral valve repair, MAZE, Left Atrial Appendage Resection, and placement of intra-aortic balloon pump on 6/30/22.      Neuro/Psych:   -- Sedation: none  -- Pain: PRN Oxy + Dilaudid  -- Scheduled Tylenol             Cards:   -- S/P Mitral Valve Repair, MAZE, Left Atrial Appendage Resection on 6/30/22  -- Epi weaned off.  -- Ventricular pacing wires. Backup paced at  60  -- MAP 60-80  -- Cleviprex PRN  -- Carvedilol 3.125 BID Aspirin 325mg       Pulm:   -- Goal O2 sat > 90%  -- ABG PRN  -- Daily CXR      Renal:  -- Trend BUN/Cr   -- Lasix 40 BID      FEN / GI:   -- Replace lytes as needed  -- Nutrition: cardiac diet  -- GI ppx: famotidine  -- Bowel reg: miralax  -- 5% Albumin as needed for postoperative resuscitation      ID:   -- Tm: afebrile; WBC stable  -- Margy-op ancef      Heme/Onc:   -- H/H stable   -- Daily CBC  -- 700mL Cell saver given back  -- Post-op coags pending  -- Aspirin 325mg QD      Endo:   -- BG goal 140-180      PPx:   Feeding: cardiac  Analgesia/Sedation: none / PRN Oxy + Dilaudid  Thromboembolic prevention: SCDs, SQH  HOB >30: Yes  Stress Ulcer ppx: famotidine  Glucose control: Critical care goal 140-180 g/dl, ISS     Lines/Drains/Airway: PIV x1       Dispo/Code Status/Palliative:   -- Step down/ Full Code.          Juan Dubois MD  Critical Care - Surgery  [facility] - [facility] Intensive [facility]

## 2022-07-05 NOTE — PLAN OF CARE
"      SICU PLAN OF CARE NOTE    Dx: Mitral regurgitation    Shift Events: NAEON    Goals of Care: Increased mobility    Neuro: AAO x4, Follows Commands and Moves All Extremities    Vital Signs: /70 (BP Location: Right arm, Patient Position: Lying)   Pulse 80   Temp 97.6 °F (36.4 °C) (Oral)   Resp 16   Ht 6' 1" (1.854 m)   Wt 108.4 kg (238 lb 15.7 oz)   SpO2 (!) 92%   BMI 31.53 kg/m²     Respiratory: Nasal Cannula    Diet: Cardiac Diet        Urine Output: Voids Spontaneously see flowsheet cc/shift         Labs/Accuchecks: Daily Labs.    Skin: No breakdown noted during shift, bath completed, patient ambulated to chair, patient turned Q2H     "

## 2022-07-05 NOTE — PLAN OF CARE
SICU PLAN OF CARE NOTE     Dx: Mitral regurgitation     Goals of Care:  MAP >65      Cardiac:  NSR, PPM in place, see notes/flowsheets for details     Resp:  SpO2 95% on 2L NC     Neuro:  AAO x4, Follows Commands and Moves All Extremities     Urine Output:  Voids Spontaneously     Diet:  Cardiac Diet     Labs/Accuchecks:  All labs trended reviewed and replaced accordingly.

## 2022-07-05 NOTE — PT/OT/SLP PROGRESS
"Physical Therapy Co-Treatment with OT    Patient Name:  Artemio Ogden   MRN:  [id number]    Recommendations:     Discharge Recommendations:  rehabilitation facility   Discharge Equipment Recommendations: none   Barriers to discharge: None    Assessment:     Artemio Ogden is a 72 y.o. male admitted with a medical diagnosis of Mitral regurgitation.  He presents with the following impairments/functional limitations:  weakness, impaired endurance, impaired functional mobilty, gait instability, impaired balance, decreased coordination, decreased safety awareness Pt tolerated treatment well by gait training ~30 ft. However, pt presented as SOB while standing at the sink performing grooming tasks with OT. Pt will benefit from skilled PT 5x/wk to return to previous level of function. At discharge, a rehabilitation facility is recommended to maximize functional mobility.    Rehab Prognosis: Good; patient would benefit from acute skilled PT services to address these deficits and reach maximum level of function.    Recent Surgery: Procedure(s) (LRB):  Valvuloplasty, Mitral (N/A)  MAZE (N/A)  INSERTION, INTRA-AORTIC BALLOON PUMP (Right) 5 Days Post-Op    Plan:     During this hospitalization, patient to be seen 5 x/week to address the identified rehab impairments via gait training, therapeutic activities and progress toward the following goals:    · Plan of Care Expires:  07/30/22    Subjective     Chief Complaint: "I feel out of sorts"  Patient/Family Comments/goals: to return home  Pain/Comfort:  · Pain Rating 1: 0/10  · Pain Rating Post-Intervention 1: 0/10      Objective:     Communicated with RN prior to session.  Patient found up in chair with peripheral IV, pulse ox (continuous), blood pressure cuff, telemetry, oxygen upon PT entry to room.     General Precautions: Standard, sternal, fall   Orthopedic Precautions:N/A   Braces:    Respiratory Status: Nasal cannula, flow 2 L/min     Functional Mobility:  · Transfers:  " Pt required verbal and tactile cues to complete functional mobility tasks using sternal precautions.    · Sit to Stand:  minimal assistance w/ HHA x2 people for support  ·   · Gait: pt gait trained ~30 ft to bathroom sink and back to chair with 2L O2. Gait training to the bathroom sink required min A x1, but pt quickly fatigued while performing grooming tasks in standing and required mod A x2 people to return to the chair.   ·   · Balance: Standing balance at sink performing grooming tasks with OT. CGA progressed to min A due to fatigue.       AM-PAC 6 CLICK MOBILITY  Turning over in bed (including adjusting bedclothes, sheets and blankets)?: 2  Sitting down on and standing up from a chair with arms (e.g., wheelchair, bedside commode, etc.): 3  Moving from lying on back to sitting on the side of the bed?: 2  Moving to and from a bed to a chair (including a wheelchair)?: 2  Need to walk in hospital room?: 3  Climbing 3-5 steps with a railing?: 2  Basic Mobility Total Score: 14       Therapeutic Activities and Exercises:   PT educated pt on role of PT, POC, and reminded pt of sternal precautions. Pt verbally expressed understanding of such.   Two skilled therapies were needed to ensure the safety of the pt and for maximal functional mobility.     Patient left up in chair with all lines intact, call button in reach and RN notified.    GOALS:   Multidisciplinary Problems     Physical Therapy Goals        Problem: Physical Therapy    Goal Priority Disciplines Outcome Goal Variances Interventions   Physical Therapy Goal     PT, PT/OT Ongoing, Progressing     Description: Goals to be met by: 7/15/2022    Patient will increase functional independence with mobility by performin. Supine to sit with CGA  2. Sit to stand transfer with Supervision  3. Gait  x 150 feet with Supervision   4. Ascend/descend 4 stair with no Handrails Contact Guard Assistance  5. Stand for 3 minutes with Supervision                      Time  Tracking:     PT Received On: 07/05/22  PT Start Time: 0753     PT Stop Time: 0810  PT Total Time (min): 17 min     Billable Minutes: Gait Training 17 minutes    Treatment Type: Treatment  PT/PTA: PT     PTA Visit Number: 0     07/05/2022

## 2022-07-05 NOTE — TELEPHONE ENCOUNTER
Returned call to pt's daughter. Answered pt's daughter's questions concerning potential discharge disposition. Pt's daughter verbalized understanding.       ----- Message from Sasha Singleton sent at 7/5/2022  9:52 AM CDT -----  Regarding: pt advice  Contact: Robyn (dtr) @ [phone]  Caller asking to speak with Dr. Machado regarding patient's condition, says patient is admitted to the hospital. Please call.

## 2022-07-05 NOTE — PLAN OF CARE
07/05/22 1327   Post-Acute Status   Post-Acute Authorization Placement   Post-Acute Placement Status Referrals Sent   Discharge Plan   Discharge Plan A Rehab   Discharge Plan B Rehab   LMSW sent rehab referrals through [facility] to the following facilities:  [facility]  [phone]    [facility]  [phone]    [facility]  [phone]    [facility]  [phone]      1:51 P.M.  PMR entered

## 2022-07-05 NOTE — PLAN OF CARE
Problem: Physical Therapy  Goal: Physical Therapy Goal  Description: Goals to be met by: 7/15/2022    Patient will increase functional independence with mobility by performin. Supine to sit with CGA  2. Sit to stand transfer with Supervision  3. Gait  x 150 feet with Supervision   4. Ascend/descend 4 stair with no Handrails Contact Guard Assistance  5. Stand for 3 minutes with Supervision     Outcome: Ongoing, Progressing   Goals are appropriate. 2022

## 2022-07-06 PROBLEM — D62 ACUTE BLOOD LOSS ANEMIA: Status: ACTIVE | Noted: 2022-07-06

## 2022-07-06 PROBLEM — E03.9 HYPOTHYROIDISM: Status: ACTIVE | Noted: 2022-07-06

## 2022-07-06 PROBLEM — Z74.09 IMPAIRED MOBILITY AND ADLS: Status: ACTIVE | Noted: 2022-07-06

## 2022-07-06 PROBLEM — Z78.9 IMPAIRED MOBILITY AND ADLS: Status: ACTIVE | Noted: 2022-07-06

## 2022-07-06 PROBLEM — I50.9 CHF (CONGESTIVE HEART FAILURE): Status: ACTIVE | Noted: 2022-07-06

## 2022-07-06 LAB
ALBUMIN SERPL BCP-MCNC: 2.9 G/DL (ref 3.5–5.2)
ALP SERPL-CCNC: 69 U/L (ref 55–135)
ALT SERPL W/O P-5'-P-CCNC: 23 U/L (ref 10–44)
ANION GAP SERPL CALC-SCNC: 11 MMOL/L (ref 8–16)
ASCENDING AORTA: 3.27 CM
AST SERPL-CCNC: 39 U/L (ref 10–40)
AV INDEX (PROSTH): 0.57
AV MEAN GRADIENT: 6 MMHG
AV PEAK GRADIENT: 8 MMHG
AV VALVE AREA: 2.22 CM2
AV VELOCITY RATIO: 0.63
BASOPHILS # BLD AUTO: 0.04 K/UL (ref 0–0.2)
BASOPHILS NFR BLD: 0.4 % (ref 0–1.9)
BILIRUB SERPL-MCNC: 1.4 MG/DL (ref 0.1–1)
BSA FOR ECHO PROCEDURE: 2.37 M2
BUN SERPL-MCNC: 37 MG/DL (ref 8–23)
CALCIUM SERPL-MCNC: 8.8 MG/DL (ref 8.7–10.5)
CHLORIDE SERPL-SCNC: 98 MMOL/L (ref 95–110)
CO2 SERPL-SCNC: 29 MMOL/L (ref 23–29)
CREAT SERPL-MCNC: 1.4 MG/DL (ref 0.5–1.4)
CV ECHO LV RWT: 0.29 CM
DIFFERENTIAL METHOD: ABNORMAL
DOP CALC AO PEAK VEL: 1.44 M/S
DOP CALC AO VTI: 20.49 CM
DOP CALC LVOT AREA: 3.9 CM2
DOP CALC LVOT DIAMETER: 2.23 CM
DOP CALC LVOT PEAK VEL: 0.9 M/S
DOP CALC LVOT STROKE VOLUME: 45.4 CM3
DOP CALC MV VTI: 40.11 CM
DOP CALCLVOT PEAK VEL VTI: 11.63 CM
ECHO LV POSTERIOR WALL: 1.01 CM (ref 0.6–1.1)
EJECTION FRACTION: 15 %
EOSINOPHIL # BLD AUTO: 0.1 K/UL (ref 0–0.5)
EOSINOPHIL NFR BLD: 0.7 % (ref 0–8)
ERYTHROCYTE [DISTWIDTH] IN BLOOD BY AUTOMATED COUNT: 16.9 % (ref 11.5–14.5)
EST. GFR  (AFRICAN AMERICAN): 56.8 ML/MIN/1.73 M^2
EST. GFR  (NON AFRICAN AMERICAN): 49.1 ML/MIN/1.73 M^2
FRACTIONAL SHORTENING: 7 % (ref 28–44)
GLUCOSE SERPL-MCNC: 109 MG/DL (ref 70–110)
HCT VFR BLD AUTO: 31.7 % (ref 40–54)
HGB BLD-MCNC: 9.9 G/DL (ref 14–18)
HR MV ECHO: 65 BPM
IMM GRANULOCYTES # BLD AUTO: 0.19 K/UL (ref 0–0.04)
IMM GRANULOCYTES NFR BLD AUTO: 1.8 % (ref 0–0.5)
INTERVENTRICULAR SEPTUM: 0.79 CM (ref 0.6–1.1)
LA MAJOR: 6.51 CM
LA MINOR: 5.07 CM
LA WIDTH: 5.03 CM
LEFT ATRIUM SIZE: 4.8 CM
LEFT ATRIUM VOLUME INDEX MOD: 44.7 ML/M2
LEFT ATRIUM VOLUME INDEX: 50.4 ML/M2
LEFT ATRIUM VOLUME MOD: 103.71 CM3
LEFT ATRIUM VOLUME: 116.99 CM3
LEFT INTERNAL DIMENSION IN SYSTOLE: 6.43 CM (ref 2.1–4)
LEFT VENTRICLE DIASTOLIC VOLUME INDEX: 107.31 ML/M2
LEFT VENTRICLE DIASTOLIC VOLUME: 248.97 ML
LEFT VENTRICLE MASS INDEX: 119 G/M2
LEFT VENTRICLE SYSTOLIC VOLUME INDEX: 91 ML/M2
LEFT VENTRICLE SYSTOLIC VOLUME: 211.08 ML
LEFT VENTRICULAR INTERNAL DIMENSION IN DIASTOLE: 6.92 CM (ref 3.5–6)
LEFT VENTRICULAR MASS: 276.56 G
LYMPHOCYTES # BLD AUTO: 1.1 K/UL (ref 1–4.8)
LYMPHOCYTES NFR BLD: 10.2 % (ref 18–48)
MAGNESIUM SERPL-MCNC: 2.2 MG/DL (ref 1.6–2.6)
MCH RBC QN AUTO: 25.1 PG (ref 27–31)
MCHC RBC AUTO-ENTMCNC: 31.2 G/DL (ref 32–36)
MCV RBC AUTO: 80 FL (ref 82–98)
MONOCYTES # BLD AUTO: 1.5 K/UL (ref 0.3–1)
MONOCYTES NFR BLD: 14.1 % (ref 4–15)
MV MEAN GRADIENT: 2 MMHG
MV PEAK GRADIENT: 9 MMHG
MV VALVE AREA BY CONTINUITY EQUATION: 1.13 CM2
NEUTROPHILS # BLD AUTO: 7.7 K/UL (ref 1.8–7.7)
NEUTROPHILS NFR BLD: 72.8 % (ref 38–73)
NRBC BLD-RTO: 0 /100 WBC
PHOSPHATE SERPL-MCNC: 3.7 MG/DL (ref 2.7–4.5)
PISA TR MAX VEL: 2.55 M/S
PLATELET # BLD AUTO: 188 K/UL (ref 150–450)
PMV BLD AUTO: 10.9 FL (ref 9.2–12.9)
POCT GLUCOSE: 120 MG/DL (ref 70–110)
POCT GLUCOSE: 166 MG/DL (ref 70–110)
POCT GLUCOSE: 184 MG/DL (ref 70–110)
POTASSIUM SERPL-SCNC: 4 MMOL/L (ref 3.5–5.1)
PROT SERPL-MCNC: 6.1 G/DL (ref 6–8.4)
RA MAJOR: 5.85 CM
RA PRESSURE: 15 MMHG
RA WIDTH: 4.45 CM
RBC # BLD AUTO: 3.95 M/UL (ref 4.6–6.2)
RIGHT VENTRICULAR END-DIASTOLIC DIMENSION: 4.72 CM
RV TISSUE DOPPLER FREE WALL SYSTOLIC VELOCITY 1 (APICAL 4 CHAMBER VIEW): 7.62 CM/S
SINUS: 3.16 CM
SODIUM SERPL-SCNC: 138 MMOL/L (ref 136–145)
STJ: 2.68 CM
TDI LATERAL: 0.09 M/S
TDI SEPTAL: 0.04 M/S
TDI: 0.07 M/S
TR MAX PG: 26 MMHG
TRICUSPID ANNULAR PLANE SYSTOLIC EXCURSION: 1.23 CM
TV REST PULMONARY ARTERY PRESSURE: 41 MMHG
WBC # BLD AUTO: 10.57 K/UL (ref 3.9–12.7)

## 2022-07-06 PROCEDURE — 27000646 HC AEROBIKA DEVICE

## 2022-07-06 PROCEDURE — 84100 ASSAY OF PHOSPHORUS: CPT | Performed by: THORACIC SURGERY (CARDIOTHORACIC VASCULAR SURGERY)

## 2022-07-06 PROCEDURE — 97535 SELF CARE MNGMENT TRAINING: CPT

## 2022-07-06 PROCEDURE — 63600175 PHARM REV CODE 636 W HCPCS: Performed by: PHYSICIAN ASSISTANT

## 2022-07-06 PROCEDURE — 25000003 PHARM REV CODE 250: Performed by: STUDENT IN AN ORGANIZED HEALTH CARE EDUCATION/TRAINING PROGRAM

## 2022-07-06 PROCEDURE — 80053 COMPREHEN METABOLIC PANEL: CPT | Performed by: STUDENT IN AN ORGANIZED HEALTH CARE EDUCATION/TRAINING PROGRAM

## 2022-07-06 PROCEDURE — 25000242 PHARM REV CODE 250 ALT 637 W/ HCPCS: Performed by: PHYSICIAN ASSISTANT

## 2022-07-06 PROCEDURE — 63600175 PHARM REV CODE 636 W HCPCS

## 2022-07-06 PROCEDURE — 36415 COLL VENOUS BLD VENIPUNCTURE: CPT | Performed by: STUDENT IN AN ORGANIZED HEALTH CARE EDUCATION/TRAINING PROGRAM

## 2022-07-06 PROCEDURE — 25000003 PHARM REV CODE 250

## 2022-07-06 PROCEDURE — 94761 N-INVAS EAR/PLS OXIMETRY MLT: CPT

## 2022-07-06 PROCEDURE — 27000221 HC OXYGEN, UP TO 24 HOURS

## 2022-07-06 PROCEDURE — 85025 COMPLETE CBC W/AUTO DIFF WBC: CPT | Performed by: STUDENT IN AN ORGANIZED HEALTH CARE EDUCATION/TRAINING PROGRAM

## 2022-07-06 PROCEDURE — 97530 THERAPEUTIC ACTIVITIES: CPT

## 2022-07-06 PROCEDURE — 63600175 PHARM REV CODE 636 W HCPCS: Performed by: STUDENT IN AN ORGANIZED HEALTH CARE EDUCATION/TRAINING PROGRAM

## 2022-07-06 PROCEDURE — 25000003 PHARM REV CODE 250: Performed by: PHYSICIAN ASSISTANT

## 2022-07-06 PROCEDURE — 20600001 HC STEP DOWN PRIVATE ROOM

## 2022-07-06 PROCEDURE — 94664 DEMO&/EVAL PT USE INHALER: CPT

## 2022-07-06 PROCEDURE — 83735 ASSAY OF MAGNESIUM: CPT | Performed by: THORACIC SURGERY (CARDIOTHORACIC VASCULAR SURGERY)

## 2022-07-06 PROCEDURE — 94640 AIRWAY INHALATION TREATMENT: CPT

## 2022-07-06 PROCEDURE — 99221 PR INITIAL HOSPITAL CARE,LEVL I: ICD-10-PCS | Mod: ICN,CMP,, | Performed by: NURSE PRACTITIONER

## 2022-07-06 PROCEDURE — 99900035 HC TECH TIME PER 15 MIN (STAT)

## 2022-07-06 PROCEDURE — 99221 1ST HOSP IP/OBS SF/LOW 40: CPT | Mod: ICN,CMP,, | Performed by: NURSE PRACTITIONER

## 2022-07-06 RX ORDER — FUROSEMIDE 10 MG/ML
20 INJECTION INTRAMUSCULAR; INTRAVENOUS ONCE
Status: COMPLETED | OUTPATIENT
Start: 2022-07-06 | End: 2022-07-06

## 2022-07-06 RX ORDER — MONTELUKAST SODIUM 10 MG/1
10 TABLET ORAL DAILY
Status: DISCONTINUED | OUTPATIENT
Start: 2022-07-06 | End: 2022-07-07 | Stop reason: HOSPADM

## 2022-07-06 RX ORDER — ACETAMINOPHEN 500 MG
1000 TABLET ORAL EVERY 6 HOURS
Status: DISCONTINUED | OUTPATIENT
Start: 2022-07-06 | End: 2022-07-07 | Stop reason: HOSPADM

## 2022-07-06 RX ORDER — IPRATROPIUM BROMIDE AND ALBUTEROL SULFATE 2.5; .5 MG/3ML; MG/3ML
3 SOLUTION RESPIRATORY (INHALATION)
Status: DISCONTINUED | OUTPATIENT
Start: 2022-07-06 | End: 2022-07-07 | Stop reason: HOSPADM

## 2022-07-06 RX ADMIN — FAMOTIDINE 20 MG: 20 TABLET ORAL at 10:07

## 2022-07-06 RX ADMIN — MONTELUKAST 10 MG: 10 TABLET, FILM COATED ORAL at 10:07

## 2022-07-06 RX ADMIN — CARVEDILOL 3.12 MG: 3.12 TABLET, FILM COATED ORAL at 10:07

## 2022-07-06 RX ADMIN — FUROSEMIDE 40 MG: 10 INJECTION, SOLUTION INTRAMUSCULAR; INTRAVENOUS at 10:07

## 2022-07-06 RX ADMIN — OXYCODONE HYDROCHLORIDE 10 MG: 10 TABLET ORAL at 10:07

## 2022-07-06 RX ADMIN — ASPIRIN 325 MG ORAL TABLET 325 MG: 325 PILL ORAL at 10:07

## 2022-07-06 RX ADMIN — IPRATROPIUM BROMIDE AND ALBUTEROL SULFATE 3 ML: 2.5; .5 SOLUTION RESPIRATORY (INHALATION) at 08:07

## 2022-07-06 RX ADMIN — HEPARIN SODIUM 5000 UNITS: 5000 INJECTION INTRAVENOUS; SUBCUTANEOUS at 01:07

## 2022-07-06 RX ADMIN — ACETAMINOPHEN 1000 MG: 500 TABLET ORAL at 11:07

## 2022-07-06 RX ADMIN — ACETAMINOPHEN 1000 MG: 500 TABLET ORAL at 05:07

## 2022-07-06 RX ADMIN — HEPARIN SODIUM 5000 UNITS: 5000 INJECTION INTRAVENOUS; SUBCUTANEOUS at 05:07

## 2022-07-06 RX ADMIN — LEVOTHYROXINE SODIUM 100 MCG: 100 TABLET ORAL at 05:07

## 2022-07-06 RX ADMIN — HEPARIN SODIUM 5000 UNITS: 5000 INJECTION INTRAVENOUS; SUBCUTANEOUS at 10:07

## 2022-07-06 RX ADMIN — FUROSEMIDE 20 MG: 10 INJECTION, SOLUTION INTRAMUSCULAR; INTRAVENOUS at 11:07

## 2022-07-06 RX ADMIN — GUAIFENESIN 600 MG: 600 TABLET, EXTENDED RELEASE ORAL at 10:07

## 2022-07-06 RX ADMIN — IPRATROPIUM BROMIDE AND ALBUTEROL SULFATE 3 ML: 2.5; .5 SOLUTION RESPIRATORY (INHALATION) at 02:07

## 2022-07-06 NOTE — PLAN OF CARE
Problem: Adult Inpatient Plan of Care  Goal: Plan of Care Review  Outcome: Ongoing, Progressing  Goal: Patient-Specific Goal (Individualized)  Outcome: Ongoing, Progressing  Goal: Absence of Hospital-Acquired Illness or Injury  Outcome: Ongoing, Progressing  Goal: Optimal Comfort and Wellbeing  Outcome: Ongoing, Progressing  Goal: Readiness for Transition of Care  Outcome: Ongoing, Progressing     Problem: Activity Intolerance (Cardiovascular Surgery)  Goal: Improved Activity Tolerance  Outcome: Ongoing, Progressing     Problem: Adjustment to Surgery (Cardiovascular Surgery)  Goal: Optimal Coping with Heart Surgery  Outcome: Ongoing, Progressing     Problem: Bleeding (Cardiovascular Surgery)  Goal: Bleeding (Cardiovascular Surgery)  Outcome: Ongoing, Progressing     Problem: Bowel Motility Impaired (Cardiovascular Surgery)  Goal: Effective Bowel Elimination (Cardiovascular Surgery)  Outcome: Ongoing, Progressing     Problem: Cardiac Function Impaired (Cardiovascular Surgery)  Goal: Effective Cardiac Function  Outcome: Ongoing, Progressing     Problem: Cerebral Tissue Perfusion (Cardiovascular Surgery)  Goal: Optimal Cerebral Tissue Perfusion (Cardiovascular Surgery)  Outcome: Ongoing, Progressing     Problem: Fluid and Electrolyte Imbalance (Cardiovascular Surgery)  Goal: Fluid and Electrolyte Balance (Cardiovascular Surgery)  Outcome: Ongoing, Progressing     Problem: Glycemic Control Impaired (Cardiovascular Surgery)  Goal: Blood Glucose Level Within Targeted Range (Cardiovascular Surgery)  Outcome: Ongoing, Progressing     Problem: Infection (Cardiovascular Surgery)  Goal: Absence of Infection Signs and Symptoms  Outcome: Ongoing, Progressing     Problem: Ongoing Anesthesia Effects (Cardiovascular Surgery)  Goal: Anesthesia/Sedation Recovery  Outcome: Ongoing, Progressing     Problem: Pain (Cardiovascular Surgery)  Goal: Acceptable Pain Control  Outcome: Ongoing, Progressing     Problem: Postoperative Nausea  and Vomiting (Cardiovascular Surgery)  Goal: Nausea and Vomiting Relief (Cardiovascular Surgery)  Outcome: Ongoing, Progressing     Problem: Postoperative Urinary Retention (Cardiovascular Surgery)  Goal: Effective Urinary Elimination (Cardiovascular Surgery)  Outcome: Ongoing, Progressing     Problem: Respiratory Compromise (Cardiovascular Surgery)  Goal: Effective Oxygenation and Ventilation (Cardiovascular Surgery)  Outcome: Ongoing, Progressing     Problem: Infection  Goal: Absence of Infection Signs and Symptoms  Outcome: Ongoing, Progressing     Problem: Fall Injury Risk  Goal: Absence of Fall and Fall-Related Injury  Outcome: Ongoing, Progressing     Problem: Skin Injury Risk Increased  Goal: Skin Health and Integrity  Outcome: Ongoing, Progressing

## 2022-07-06 NOTE — PLAN OF CARE
"   07/06/22 1312   Post-Acute Status   Post-Acute Authorization Placement   Post-Acute Placement Status Set-up Complete/Auth obtained     EMY met with pt at bedside to discuss discharge planning.  EMY explained that both [facility] in [place] and [facility] in [place] have accepted pt and that he can decide which one he wants.  Pt reported that his wife has gone to "a facility off [address] in [place]" that he wanted to go to, but after some research SW came back and explained that she was unable to figure out which facility that was.  SW again provided pt with the name and address of both facilities and pt stated that he would go with [facility].  SW relayed this to Bree at [facility].    EMY later received a call from Inge with [facility] reporting that pt's daughter was calling her reporting that pt was "confused" about which facility he wanted and that they wanted [facility] because that is where pt's wife had gone before.  EMY then received a call from pt's daughter Robyn explaining the same thing and stated that she was currently in the room (Robyn had not been present previously and pt had not asked SW to contact her).  EMY met with pt and Robyn at bedside who both confirmed that [facility] was the facility of choice.  EMY relayed this to both Inge at [facility] and Bree at [facility].  Planning to transfer pt today.      UPDATE 3:01 PM  Per Inge with [facility] they will be able to take pt tomorrow morning.  EMY relayed this to [facility] VIKI Agrawal who voiced agreement with plan.  Transportation scheduled via wheelchair van for 9:00am tomorrow 7/7 to transfer to [facility].  RN to call report to charge nurse at [phone].  ROSEANNA Olivo was notified of the above information.  EMY also notified pt at bedside of transfer time.      Ashlyn Sosa, EMY  [facility]  [id number]    "

## 2022-07-06 NOTE — HOSPITAL COURSE
On 6/30/22, the patient was taken to the Operating Room for the above stated procedure. Please see the previously dictated operative report for complete details. Postoperatively, the patient was taken from the  Operating Room to the ICU where the vital signs were monitored and pain was kept under control. The patient was weaned from the drips and extubated in the ICU per protocol. Once hemodynamically stable, the patient was transferred to the Cardiac Step-Down floor for continued strengthening and ambulation. On postoperative day 6, the patient was ready for discharge to home. At the time of discharge, the patient was ambulating unassisted. Pain was well controlled with oral analgesics and the patient was tolerating the diet.     MOBILITY AND ACTIVITY: As tolerated. Patient may shower. No heavy lifting of greater than 5 pounds and no driving.     DIET: An 1800-calorie ADA with a 1500 mL fluid restriction.     WOUND CARE INSTRUCTIONS: Check for redness, swelling and drainage around the  incision or wound. Patient is to call for any obvious bleeding, drainage, pus from the wound, unusual problems or difficulties or temperature of greater than 101   degrees.     FOLLOWUP: Follow up with Dr. Machado in approximately 3 weeks. Prior to this  appointment, the patient will have a chest x-ray and EKG.     Patient not placed on [employer]-Inhibitor at the time of discharge due to potential for hypotension     DISCHARGE CONDITION: At the time of discharge, the patient was in sinus rhythm and afebrile with stable vital signs.

## 2022-07-06 NOTE — ASSESSMENT & PLAN NOTE
-S/P Mitral valve repair on 06/30.  -On IV Lasix currently.   - On NC O2.  -Noted to have intermittent SOB with exertion.  -PT/OT rec IPR.

## 2022-07-06 NOTE — DISCHARGE SUMMARY
[facility] - [facility] Stepdown  Cardiothoracic Surgery  Discharge Summary      Patient Name: Artemio Ogden  MRN: [id number]  Admission Date: 6/30/2022  Hospital Length of Stay: 7 days  Discharge Date and Time:  07/07/2022 2:32 PM  Attending Physician: Kurtis Machado MD   Discharging Provider: Alice Ojeda PA-C  Primary Care Provider: Danna Lanza MD    HPI:   Mr. Ogden is a very pleasant 72-year-old gentleman who initially presented with dyspnea on exertion, fatigue, and orthopnea.  He underwent a thorough evaluation which demonstrated moderate to severe mitral regurgitation as well as atrial fibrillation. His ejection fraction is 35%. He now presents for surgical correction.      Procedure(s) (LRB):  Valvuloplasty, Mitral (N/A)  MAZE (N/A)  INSERTION, INTRA-AORTIC BALLOON PUMP (Right)      Hospital Course: On 6/30/22, the patient was taken to the Operating Room for the above stated procedure. Please see the previously dictated operative report for complete details. Postoperatively, the patient was taken from the  Operating Room to the ICU where the vital signs were monitored and pain was kept under control. The patient was weaned from the drips and extubated in the ICU per protocol. Once hemodynamically stable, the patient was transferred to the Cardiac Step-Down floor for continued strengthening and ambulation. On postoperative day 7, the patient was ready for discharge to rehab. At the time of discharge, the patient was ambulating with assistance. Pain was well controlled with oral analgesics and the patient was tolerating the diet.     MOBILITY AND ACTIVITY: As tolerated. Patient may shower. No heavy lifting of greater than 5 pounds and no driving.     DIET: An 1800-calorie ADA with a 1500 mL fluid restriction.     WOUND CARE INSTRUCTIONS: Check for redness, swelling and drainage around the  incision or wound. Patient is to call for any obvious bleeding, drainage, pus from the wound, unusual  problems or difficulties or temperature of greater than 101   degrees.     FOLLOWUP: Follow up with Dr. Machado in approximately 3 weeks. Prior to this  appointment, the patient will have a chest x-ray and EKG.     Patient not placed on Ace-Inhibitor at the time of discharge due to potential for hypotension     DISCHARGE CONDITION: At the time of discharge, the patient was in sinus rhythm and afebrile with stable vital signs.        Goals of Care Treatment Preferences:  Code Status: Full Code      Consults (From admission, onward)        Status Ordering Provider     Inpatient consult to Physical Medicine Rehab  Once        Provider:  (Not yet assigned)    Completed MARIA MACHADO     Inpatient consult to Physical Medicine Rehab  Once        Provider:  (Not yet assigned)    Completed YAN FUENTES     Consult to Endocrinology  Once        Provider:  (Not yet assigned)    Completed DEACON HART     Consult Case Management/Social Work  Once        Provider:  (Not yet assigned)    Acknowledged DEACON HART     Inpatient consult to Registered Dietitian/Nutritionist  Once        Provider:  (Not yet assigned)    Completed SCOTTY DEVINE          Significant Diagnostic Studies:    Pending Diagnostic Studies:     Procedure Component Value Units Date/Time    Specimen to Pathology, Surgery Cardiovascular [[id number]] Collected: 06/30/22 1122    Order Status: Sent Lab Status: In process Updated: 06/30/22 1559    Specimen: Tissue           No new Assessment & Plan notes have been filed under this hospital service since the last note was generated.  Service: Cardiothoracic Surgery    Final Active Diagnoses:    Diagnosis Date Noted POA    PRINCIPAL PROBLEM:  Mitral regurgitation [I34.0] 05/31/2022 Yes    Acute blood loss anemia [D62] 07/06/2022 Unknown    Hypothyroidism [E03.9] 07/06/2022 Unknown    CHF (congestive heart failure) [I50.9] 07/06/2022 Unknown    Impaired mobility and ADLs [Z74.09, Z78.9]  07/06/2022 No    Stress hyperglycemia [R73.9] 07/01/2022 Unknown    S/P mitral valve repair [Z98.890] 07/01/2022 Not Applicable    Encounter for weaning from ventilator [Z99.11] 06/30/2022 Not Applicable    Paroxysmal atrial fibrillation [I48.0] 05/31/2022 Yes      Problems Resolved During this Admission:      Discharged Condition: stable    Disposition: Rehab Facility    Follow Up:    Patient Instructions:   No discharge procedures on file.  Medications:    See plan of care note     Time spent on the discharge of patient: 30 minutes    Alice Ojeda PA-C  Cardiothoracic Surgery  [facility] - [facility] Stepdown

## 2022-07-06 NOTE — HPI
Per chart review, Mr. Ogden is a 72 year old male with a hx of spinal stenosis, bilateral carotid artery stenosis, paroxysmal Afib, and severe mitral regurgitation. Now s/p Mitral valve repair, MAZE, Left Atrial Appendage Resection, and placement of intra-aortic balloon pump on 6/30/22. PM & R was consulted to evaluate pt for rehab.     Functional History: Patient lives with wife, whom he takes care of due to having had a stroke,  in one story home with 4 steps to enter.  Prior to admission, pt was independent with ADLs and mobility.  DME: None.

## 2022-07-06 NOTE — PT/OT/SLP PROGRESS
Occupational Therapy   Treatment    Name: Artemio Ogden  MRN: [id number]  Admitting Diagnosis:  Mitral regurgitation  6 Days Post-Op    Recommendations:     Discharge Recommendations: rehabilitation facility  Discharge Equipment Recommendations:  none  Barriers to discharge:  Inaccessible home environment, Decreased caregiver support    Assessment:     Artemio Ogden is a 72 y.o. male with a medical diagnosis of Mitral regurgitation.  He presents with MVR and has sternal precautions.  Was able to walk to bathroom and perform grooming and self care.  Pt c max c/o SOB and O2 SATs were at 94 at the end of tx session after 2 minutes of rest.  Required cues to maintain sternal precautions. Performance deficits affecting function are impaired self care skills, impaired functional mobilty, weakness, impaired endurance, impaired balance.     Rehab Prognosis:  Good; patient would benefit from acute skilled OT services to address these deficits and reach maximum level of function.       Plan:     Patient to be seen 5 x/week to address the above listed problems via self-care/home management, therapeutic activities, therapeutic exercises  · Plan of Care Expires: 07/16/22  · Plan of Care Reviewed with: patient    Subjective     Pain/Comfort:  · Pain Rating 1: 0/10    Objective:     Communicated with: RN prior to session.  Patient found up in chair with oxygen, telemetry upon OT entry to room.    General Precautions: Standard, fall, sternal   Orthopedic Precautions:N/A   Braces: N/A  Respiratory Status: Nasal cannula, flow 1 L/min     Occupational Performance:     Functional Mobility/Transfers:  · Patient completed Sit <> Stand Transfer with contact guard assistance  with  no assistive device   · Functional Mobility: Pt was able to walk to bathroom c CGA    Activities of Daily Living:  · Grooming: stand by assistance to brush teeth and wash off face while standing at sink.  · Lower Body Dressing: stand by assistance to  don/doff socks while sitting UIC.      AMPAC 6 Click ADL: 18    Patient left up in chair with all lines intact, call button in reach and RN notifiedEducation:      GOALS:   Multidisciplinary Problems     Occupational Therapy Goals        Problem: Occupational Therapy    Goal Priority Disciplines Outcome Interventions   Occupational Therapy Goal     OT, PT/OT Ongoing, Progressing    Description: Goals to be met by: 7/16/22     Patient will increase functional independence with ADLs by performing:    UE Dressing with [place].  LE Dressing with [place].  Grooming while standing at sink with [place].  Toileting from toilet with [place] for hygiene and clothing management.   Bathing from  shower chair/bench with [place].  Toilet transfer to toilet with [place].  Increased functional strength to WFL for B UE.  Upper extremity exercise program x15 reps per handout, with assistance as needed.                     Time Tracking:     OT Date of Treatment: 07/06/22  OT Start Time: 0826  OT Stop Time: 0851  OT Total Time (min): 25 min    Billable Minutes:Self Care/Home Management 15  Therapeutic Activity 10               7/6/2022

## 2022-07-06 NOTE — SUBJECTIVE & OBJECTIVE
Past Medical History:   Diagnosis Date    A-fib     Digestive disorder     reflux    Mitral regurgitation 5/31/2022    Sleep apnea     No CPAP    Thyroid disease      Past Surgical History:   Procedure Laterality Date    ABLATION N/A 6/30/2022    Procedure: MAZE;  Surgeon: Kurtis Machado MD;  Location: [facility] OR [address] [place] [address];  Service: Cardiothoracic;  Laterality: N/A;    CARDIAC SURGERY      AICD    EYE SURGERY Left 04/05/2012    Cataract     EYE SURGERY Left 02/14/2013    YAG Laser    EYE SURGERY Bilateral 01/14/2011    Lasik     HERNIA REPAIR  01/01/1967    INSERTION OF INTRA-AORTIC BALLOON ASSIST DEVICE Right 6/30/2022    Procedure: INSERTION, INTRA-AORTIC BALLOON PUMP;  Surgeon: Kurtis Machado MD;  Location: [facility] OR [address] [place] [address];  Service: Cardiothoracic;  Laterality: Right;    LEFT HEART CATHETERIZATION Left 6/2/2022    Procedure: CATHETERIZATION, HEART, LEFT;  Surgeon: Nikhil Lechuga III, MD;  Location: [facility];  Service: Cardiology;  Laterality: Left;    PROSTATE SURGERY  01/01/1999    THYROID LOBECTOMY  09/11/2006     Review of patient's allergies indicates:   Allergen Reactions    Ace inhibitors      Other reaction(s): cough    Arb-angiotensin receptor antagonist      Other reaction(s): cough       Scheduled Medications:    acetaminophen  1,000 mg Oral Q6H    albuterol-ipratropium  3 mL Nebulization Q6H WAKE    aspirin  325 mg Oral Daily    carvediloL  3.125 mg Oral BID    famotidine  20 mg Oral BID    furosemide (LASIX) injection  20 mg Intravenous Once    furosemide (LASIX) injection  40 mg Intravenous BID    guaiFENesin  600 mg Oral BID    heparin (porcine)  5,000 Units Subcutaneous Q8H    levothyroxine  100 mcg Oral Before breakfast    montelukast  10 mg Oral Daily    polyethylene glycol  17 g Oral Daily    senna-docusate 8.6-50 mg  1 tablet Oral BID       PRN Medications: dextrose 10%, dextrose 10%, ondansetron, oxyCODONE, oxyCODONE    Family History       Family history is unknown by patient.           Tobacco Use    Smoking status: Former Smoker    Smokeless tobacco: Never Used   Substance and Sexual Activity    Alcohol use: No    Drug use: No    Sexual activity: Yes     Partners: Female     Review of Systems   Constitutional:  Positive for activity change.   Objective:     Vital Signs (Most Recent):  Temp: 97.6 °F (36.4 °C) (07/06/22 0823)  Pulse: 74 (07/06/22 0823)  Resp: 18 (07/06/22 1019)  BP: 113/71 (07/06/22 0823)  SpO2: 95 % (07/06/22 0823)    Vital Signs (24h Range):  Temp:  [97.6 °F (36.4 °C)-99 °F (37.2 °C)] 97.6 °F (36.4 °C)  Pulse:  [69-97] 74  Resp:  [18-32] 18  SpO2:  [77 %-98 %] 95 %  BP: ()/(57-78) 113/71     Body mass index is 31.35 kg/m².    Physical Exam  Vitals and nursing note reviewed.   Constitutional:       General: He is awake.      Appearance: Normal appearance. He is obese.   HENT:      Head: Normocephalic and atraumatic.   Eyes:      Extraocular Movements: Extraocular movements intact.   Pulmonary:      Comments: SOB with exertion  Abdominal:      Palpations: Abdomen is soft.   Musculoskeletal:         General: Normal range of motion.      Cervical back: Normal range of motion and neck supple.   Skin:     General: Skin is warm and dry.      Comments: Midline chest incision with steri strips clean, dry and intact.    Neurological:      General: No focal deficit present.      Mental Status: He is alert and oriented to person, place, and time.      GCS: GCS eye subscore is 4. GCS verbal subscore is 5. GCS motor subscore is 6.      Sensory: Sensation is intact.      Motor: Weakness present.      Gait: Gait abnormal.   Psychiatric:         Mood and Affect: Mood normal.         Behavior: Behavior normal. Behavior is cooperative.     NEUROLOGICAL EXAMINATION:     MENTAL STATUS   Oriented to person, place, and time.     Diagnostic Results: Labs: Reviewed

## 2022-07-06 NOTE — ASSESSMENT & PLAN NOTE
ASA  BB   Lasix and K   Will give extra push of lasix today and convert to PO tomorrow   Wean O2 as tolerated   Breathing treatments added  Pepcid   Mucinex   Bowel regimen   PRN pain meds   SubQ heparin   Home synthroid

## 2022-07-06 NOTE — PLAN OF CARE
[facility]     Department of Hospital Medicine     [address] [place] [address]     [phone] after hours  [phone] fax                                        FACILITY TRANSFER ORDERS     Patient Name: Artemio Ogden  YOB: [birth year]    Admit to:  Rehab     Diagnoses:  Active Hospital Problems    Diagnosis  POA    *Mitral regurgitation [I34.0]  Yes    Acute blood loss anemia [D62]  Unknown    Hypothyroidism [E03.9]  Unknown    CHF (congestive heart failure) [I50.9]  Unknown    Stress hyperglycemia [R73.9]  Unknown    S/P mitral valve repair [Z98.890]  Not Applicable    Encounter for weaning from ventilator [Z99.11]  Not Applicable    Paroxysmal atrial fibrillation [I48.0]  Yes      Resolved Hospital Problems   No resolved problems to display.       Vital Signs: Routine.    Allergies:  Review of patient's allergies indicates:   Allergen Reactions    Ace inhibitors      Other reaction(s): cough    Arb-angiotensin receptor antagonist      Other reaction(s): cough       Diet: cardiac diet     Acitivities: activity as tolerated, no pushing or pulling with arms, no lifting more than 5 pounds, no driving for 4 weeks after surgery     Nursing: Wash incision and chest tube sites daily with soap and water. Pat dry. Do not apply and lotions or creams. Notify MD of any erythema, drainage, or signs of dehiscence. Wean oxygen as tolerated.     Labs: BMP, CBC, magnesium daily. Keep K>4 and Mg > 2    CONSULTS:        Physical Therapy to evaluate and treat     Occupational Therapy to evaluate and treat    Report CBG < 60 or > 350 to physician.                                          Insulin Sliding Scale          Glucose  Novolog Insulin Subcutaneous        0 - 60   Orange juice or glucose tablet, hold insulin      No insulin   201-250  2 units   251-300  4 units   301-350  6 units   351-400  8 units   >400   10 units then call  physician    Medications:   Scheduled Meds:   acetaminophen  1,000 mg Oral Q6H    albuterol-ipratropium  3 mL Nebulization Q6H WAKE    aspirin  325 mg Oral Daily    carvediloL  3.125 mg Oral BID    famotidine  20 mg Oral BID    furosemide (LASIX)   40 mg Oral  BID    guaiFENesin  600 mg Oral BID    heparin (porcine)  5,000 Units Subcutaneous Q8H    levothyroxine  100 mcg Oral Before breakfast    montelukast  10 mg Oral Daily    polyethylene glycol  17 g Oral Daily    senna-docusate 8.6-50 mg  1 tablet Oral BID     Potassium chloride  30mEQ tablet  Oral BID     Oxycodone 5 mg Q4H PRN pain           _________________________________  Alice Ojeda PA-C  07/06/2022   Tranexamic Acid Pregnancy And Lactation Text: It is unknown if this medication is safe during pregnancy or breast feeding.

## 2022-07-06 NOTE — PT/OT/SLP PROGRESS
Physical Therapy      Patient Name:  Artemio Ogden   MRN:  [id number]    Patient not seen today. Pt planning to discharge to IP Rehab today. Will follow-up if pt remains in the hospital.

## 2022-07-06 NOTE — NURSING
Pt resting, v/s stable, resp even and unlabored, skin intact, redness to groin site (R) side of inner thight.  M.D made aware

## 2022-07-06 NOTE — HOSPITAL COURSE
OT- 07/06    Functional Mobility/Transfers:  Patient completed Sit <> Stand Transfer with contact guard assistance  with  no assistive device   Functional Mobility: Pt was able to walk to bathroom c CGA     Activities of Daily Living:  Grooming: stand by assistance to brush teeth and wash off face while standing at sink.  Lower Body Dressing: stand by assistance.    PT- 07/02      Bed Mobility:  Not performed 2nd to pt found in chair      Transfers:   Sit <> Stand Transfer: minimum assistance with no assistive device from chair           Gait:      Patient ambulated: 6 steps forward/backward   Patient required: minimal assist  Patient used: no assistive device

## 2022-07-06 NOTE — CONSULTS
Inpatient consult to Physical Medicine Rehab  Consult performed by: Florina Palumbo NP  Consult ordered by: Kurtis Machado MD      Consult received.  Reviewed patient history and current admission.  PM&R following.  Florina Palumbo NP  Physical Medicine & Rehabilitation   07/06/2022

## 2022-07-06 NOTE — CONSULTS
[facility] - [facility] Step[facility]  Physical Medicine & Rehab  Consult Note    Patient Name: Artemio Ogden  MRN: [id number]  Admission Date: 6/30/2022  Hospital Length of Stay: 6 days  Attending Physician: Kurtis Machado MD  Consults  Subjective:     Principal Problem: Mitral regurgitation    HPI: Per chart review, Mr. Ogden is a 72 year old male with a hx of spinal stenosis, bilateral carotid artery stenosis, paroxysmal Afib, and severe mitral regurgitation. Now s/p Mitral valve repair, MAZE, Left Atrial Appendage Resection, and placement of intra-aortic balloon pump on 6/30/22. PM & R was consulted to evaluate pt for rehab.     Functional History: Patient lives with wife, whom he takes care of due to having had a stroke,  in one story home with 4 steps to enter.  Prior to admission, pt was independent with ADLs and mobility.  DME: None.         Hospital Course: OT- 07/06    Functional Mobility/Transfers:  · Patient completed Sit <> Stand Transfer with contact guard assistance  with  no assistive device   · Functional Mobility: Pt was able to walk to bathroom c CGA     Activities of Daily Living:  · Grooming: stand by assistance to brush teeth and wash off face while standing at sink.  · Lower Body Dressing: stand by assistance.    PT- 07/02      Bed Mobility:  Not performed 2nd to pt found in chair      Transfers:   · Sit <> Stand Transfer: minimum assistance with no assistive device from chair           Gait:      · Patient ambulated: 6 steps forward/backward   · Patient required: minimal assist  · Patient used: no assistive device        Past Medical History:   Diagnosis Date    A-fib     Digestive disorder     reflux    Mitral regurgitation 5/31/2022    Sleep apnea     No CPAP    Thyroid disease      Past Surgical History:   Procedure Laterality Date    ABLATION N/A 6/30/2022    Procedure: MAZE;  Surgeon: Kurtis Machado MD;  Location: [facility] OR [address] [place] [address];  Service: Cardiothoracic;  Laterality: N/A;     CARDIAC SURGERY      AICD    EYE SURGERY Left 04/05/2012    Cataract     EYE SURGERY Left 02/14/2013    YAG Laser    EYE SURGERY Bilateral 01/14/2011    Lasik     HERNIA REPAIR  01/01/1967    INSERTION OF INTRA-AORTIC BALLOON ASSIST DEVICE Right 6/30/2022    Procedure: INSERTION, INTRA-AORTIC BALLOON PUMP;  Surgeon: Kurtis Machado MD;  Location: [facility] OR [address] [place] [address];  Service: Cardiothoracic;  Laterality: Right;    LEFT HEART CATHETERIZATION Left 6/2/2022    Procedure: CATHETERIZATION, HEART, LEFT;  Surgeon: Nikhil Lechuga III, MD;  Location: [facility];  Service: Cardiology;  Laterality: Left;    PROSTATE SURGERY  01/01/1999    THYROID LOBECTOMY  09/11/2006     Review of patient's allergies indicates:   Allergen Reactions    Ace inhibitors      Other reaction(s): cough    Arb-angiotensin receptor antagonist      Other reaction(s): cough       Scheduled Medications:    acetaminophen  1,000 mg Oral Q6H    albuterol-ipratropium  3 mL Nebulization Q6H WAKE    aspirin  325 mg Oral Daily    carvediloL  3.125 mg Oral BID    famotidine  20 mg Oral BID    furosemide (LASIX) injection  20 mg Intravenous Once    furosemide (LASIX) injection  40 mg Intravenous BID    guaiFENesin  600 mg Oral BID    heparin (porcine)  5,000 Units Subcutaneous Q8H    levothyroxine  100 mcg Oral Before breakfast    montelukast  10 mg Oral Daily    polyethylene glycol  17 g Oral Daily    senna-docusate 8.6-50 mg  1 tablet Oral BID       PRN Medications: dextrose 10%, dextrose 10%, ondansetron, oxyCODONE, oxyCODONE    Family History       Family history is unknown by patient.          Tobacco Use    Smoking status: Former Smoker    Smokeless tobacco: Never Used   Substance and Sexual Activity    Alcohol use: No    Drug use: No    Sexual activity: Yes     Partners: Female     Review of Systems   Constitutional:  Positive for activity change.   Objective:     Vital Signs (Most Recent):  Temp: 97.6 °F (36.4 °C)  (07/06/22 0823)  Pulse: 74 (07/06/22 0823)  Resp: 18 (07/06/22 1019)  BP: 113/71 (07/06/22 0823)  SpO2: 95 % (07/06/22 0823)    Vital Signs (24h Range):  Temp:  [97.6 °F (36.4 °C)-99 °F (37.2 °C)] 97.6 °F (36.4 °C)  Pulse:  [69-97] 74  Resp:  [18-32] 18  SpO2:  [77 %-98 %] 95 %  BP: ()/(57-78) 113/71     Body mass index is 31.35 kg/m².    Physical Exam  Vitals and nursing note reviewed.   Constitutional:       General: He is awake.      Appearance: Normal appearance. He is obese.   HENT:      Head: Normocephalic and atraumatic.   Eyes:      Extraocular Movements: Extraocular movements intact.   Pulmonary:      Comments: SOB with exertion  Abdominal:      Palpations: Abdomen is soft.   Musculoskeletal:         General: Normal range of motion.      Cervical back: Normal range of motion and neck supple.   Skin:     General: Skin is warm and dry.      Comments: Midline chest incision with steri strips clean, dry and intact.    Neurological:      General: No focal deficit present.      Mental Status: He is alert and oriented to person, place, and time.      GCS: GCS eye subscore is 4. GCS verbal subscore is 5. GCS motor subscore is 6.      Sensory: Sensation is intact.      Motor: Weakness present.      Gait: Gait abnormal.   Psychiatric:         Mood and Affect: Mood normal.         Behavior: Behavior normal. Behavior is cooperative.     NEUROLOGICAL EXAMINATION:     MENTAL STATUS   Oriented to person, place, and time.     Diagnostic Results: Labs: Reviewed    Assessment/Plan:     * Mitral regurgitation  -S/P Mitral valve repair on 06/30.  -On IV Lasix currently.   - On NC O2.  -Noted to have intermittent SOB with exertion.  -PT/OT rec IPR.         Impaired mobility and ADLs  See hospital course for functional status.    Recommendations  -  Encourage mobility, OOB in chair, and early ambulation as appropriate  -  PT/OT evaluate and treat  -  Pain management  -  DVT prophylaxis if appropriate   -  Monitor for and  prevent skin breakdown and pressure ulcers  · Early mobility, repositioning/weight shifting every 20-30 minutes when sitting, turn patient every 2 hours, proper mattress/overlay and chair cushioning, pressure relief/heel protector boots      Acute blood loss anemia  -Monitor CBC daily.     S/P mitral valve repair  -S/P MAZE.  -Monitor HR and Heart Rhythm closely.     Stress hyperglycemia  -Endocrine following.     Paroxysmal atrial fibrillation  -S/P MAZE. Monitor HR and rhythm closely.     PM&R Recommendation:     At this time, the PM&R team has reviewed this patient's ongoing medical case including inpatient diagnosis, medical history, clinical examination, labs, vitals, current social and functional history to provide the post-acute recommendation as follows:     RECOMMENDATIONS:npatient rehabilitation due to fair to good potential for improvement with therapies and need of physician oversight for management of ongoing active medical issues, once medically stable. Pt is anticipating to be doing inpatient rehab at a rehab facility closer to home in the [place]. 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       We will sign off.     Thank you for your consult.     Florina Palumbo NP  Department of Physical Medicine & Rehab  [facility] Step[facility]

## 2022-07-06 NOTE — SUBJECTIVE & OBJECTIVE
Interval History: NAEON. Stepped down yesterday afternoon. Reports feeling SOB when laying flat. Will give an extra push of lasix. Wean oxygen as tolerated. Breathing treatments and home Singulair added. Chest tube sites cleaned and new dressing placed. Will add Boost with all meals. Ok to use platform walker. Patient takes care of his wife who had a stroke so is very motivated to return to prior level of functioning.     Review of Systems   Constitutional: Negative for malaise/fatigue.   Cardiovascular:  Positive for dyspnea on exertion. Negative for chest pain and palpitations.   Respiratory:  Positive for cough.    Hematologic/Lymphatic: Negative for bleeding problem.   Neurological:  Positive for weakness.   Medications:  Continuous Infusions:  Scheduled Meds:   albuterol-ipratropium  3 mL Nebulization Q6H WAKE    aspirin  325 mg Oral Daily    carvediloL  3.125 mg Oral BID    famotidine  20 mg Oral BID    furosemide (LASIX) injection  20 mg Intravenous Once    furosemide (LASIX) injection  40 mg Intravenous BID    guaiFENesin  600 mg Oral BID    heparin (porcine)  5,000 Units Subcutaneous Q8H    levothyroxine  100 mcg Oral Before breakfast    montelukast  10 mg Oral Daily    polyethylene glycol  17 g Oral Daily    senna-docusate 8.6-50 mg  1 tablet Oral BID     PRN Meds:dextrose 10%, dextrose 10%, ondansetron, oxyCODONE, oxyCODONE     Objective:     Vital Signs (Most Recent):  Temp: 97.6 °F (36.4 °C) (07/06/22 0823)  Pulse: 74 (07/06/22 0823)  Resp: 18 (07/06/22 1019)  BP: 113/71 (07/06/22 0823)  SpO2: 95 % (07/06/22 0823) Vital Signs (24h Range):  Temp:  [97.6 °F (36.4 °C)-99.2 °F (37.3 °C)] 97.6 °F (36.4 °C)  Pulse:  [69-97] 74  Resp:  [18-32] 18  SpO2:  [77 %-98 %] 95 %  BP: ()/(53-78) 113/71     Weight: 107.8 kg (237 lb 10.5 oz)  Body mass index is 31.35 kg/m².    SpO2: 95 %  O2 Device (Oxygen Therapy): nasal cannula    Intake/Output - Last 3 Shifts         07/04 0700 07/05 0659 07/05 0700 07/06 0659  07/06 0700  07/07 0659    P.O. 1420 500     IV Piggyback       Total Intake(mL/kg) 1420 (13.1) 500 (4.6)     Urine (mL/kg/hr) 1765 (0.7) 905 (0.3)     Stool 0 0     Chest Tube       Total Output 1765 905     Net -345 -405            Urine Occurrence  1 x     Stool Occurrence 1 x 1 x             Lines/Drains/Airways       Peripheral Intravenous Line  Duration                  Peripheral IV - Single Lumen 07/03/22 1713 20 G Left Antecubital 2 days         Peripheral IV - Single Lumen 07/05/22 0645 20 G Anterior;Left Forearm 1 day                    Physical Exam  Constitutional:       General: He is not in acute distress.  HENT:      Head: Normocephalic and atraumatic.   Eyes:      Pupils: Pupils are equal, round, and reactive to light.   Cardiovascular:      Rate and Rhythm: Normal rate and regular rhythm.      Comments: Chest tube site c/d/I   PPM   Pulmonary:      Effort: Pulmonary effort is normal. No respiratory distress.      Comments: 2L O2   Abdominal:      General: There is no distension.   Musculoskeletal:         General: Swelling (minimal) present. Normal range of motion.      Cervical back: Normal range of motion.   Skin:     Coloration: Skin is not pale.      Comments: Midline sternal incision c/d/i   Neurological:      General: No focal deficit present.      Mental Status: He is alert and oriented to person, place, and time.   Psychiatric:         Mood and Affect: Mood normal.         Behavior: Behavior normal.       Significant Labs:  BMP:   Recent Labs   Lab 07/06/22  0558         K 4.0   CL 98   CO2 29   BUN 37*   CREATININE 1.4   CALCIUM 8.8   MG 2.2     CBC:   Recent Labs   Lab 07/06/22  0558   WBC 10.57   RBC 3.95*   HGB 9.9*   HCT 31.7*      MCV 80*   MCH 25.1*   MCHC 31.2*     CMP:   Recent Labs   Lab 07/06/22 0558      CALCIUM 8.8   ALBUMIN 2.9*   PROT 6.1      K 4.0   CO2 29   CL 98   BUN 37*   CREATININE 1.4   ALKPHOS 69   ALT 23   AST 39   BILITOT 1.4*      Coagulation: No results for input(s): PT, INR, APTT in the last 48 hours.    Significant Diagnostics:  I have reviewed all pertinent imaging results/findings within the past 24 hours.

## 2022-07-06 NOTE — HPI
Mr. Ogden is a very pleasant 72-year-old gentleman who initially presented with dyspnea on exertion, fatigue, and orthopnea.  He underwent a thorough evaluation which demonstrated moderate to severe mitral regurgitation as well as atrial fibrillation. His ejection fraction is 35%. He now presents for surgical correction.

## 2022-07-06 NOTE — CONSULTS
Inpatient consult to Physical Medicine Rehab  Consult performed by: Florina Palumbo NP  Consult ordered by: Nuha Rashid MD      Consult received.  Reviewed patient history and current admission.  PM&R following.  Florina Palumbo NP  Physical Medicine & Rehabilitation   07/06/2022

## 2022-07-06 NOTE — PROGRESS NOTES
Pt transferred to . Report given to pt's primary nurse. VSS during transfer, pt transferred on 2L NC. Transferred with belonging and chart. ROSEANNA Herrera notified of pt's arrival, Sharon notified pt's primary nurse and present in room to receive pt and take pt vitals. Notified that pt needs telemetry box as it was not available at time of transfer and pt transferred on continuous cardiac monitor.

## 2022-07-06 NOTE — PROGRESS NOTES
[facility] - Cardiology Stepdown  Cardiothoracic Surgery  Progress Note    Patient Name: Artemio Ogden  MRN: [id number]  Admission Date: 6/30/2022  Hospital Length of Stay: 6 days  Code Status: Full Code   Attending Physician: Kurtis Machado MD   Referring Provider: Kurtis Machado MD  Principal Problem:Mitral regurgitation      Subjective:     Post-Op Info:  Procedure(s) (LRB):  Valvuloplasty, Mitral (N/A)  MAZE (N/A)  INSERTION, INTRA-AORTIC BALLOON PUMP (Right)   6 Days Post-Op     Interval History: NAEON. Stepped down yesterday afternoon. Reports feeling SOB when laying flat. Will give an extra push of lasix. Wean oxygen as tolerated. Breathing treatments and home Singulair added. Chest tube sites cleaned and new dressing placed. Will add Boost with all meals. Ok to use platform walker. Patient takes care of his wife who had a stroke so is very motivated to return to prior level of functioning.     Review of Systems   Constitutional: Negative for malaise/fatigue.   Cardiovascular:  Positive for dyspnea on exertion. Negative for chest pain and palpitations.   Respiratory:  Positive for cough.    Hematologic/Lymphatic: Negative for bleeding problem.   Neurological:  Positive for weakness.   Medications:  Continuous Infusions:  Scheduled Meds:   albuterol-ipratropium  3 mL Nebulization Q6H WAKE    aspirin  325 mg Oral Daily    carvediloL  3.125 mg Oral BID    famotidine  20 mg Oral BID    furosemide (LASIX) injection  20 mg Intravenous Once    furosemide (LASIX) injection  40 mg Intravenous BID    guaiFENesin  600 mg Oral BID    heparin (porcine)  5,000 Units Subcutaneous Q8H    levothyroxine  100 mcg Oral Before breakfast    montelukast  10 mg Oral Daily    polyethylene glycol  17 g Oral Daily    senna-docusate 8.6-50 mg  1 tablet Oral BID     PRN Meds:dextrose 10%, dextrose 10%, ondansetron, oxyCODONE, oxyCODONE     Objective:     Vital Signs (Most Recent):  Temp: 97.6 °F (36.4 °C) (07/06/22  0823)  Pulse: 74 (07/06/22 0823)  Resp: 18 (07/06/22 1019)  BP: 113/71 (07/06/22 0823)  SpO2: 95 % (07/06/22 0823) Vital Signs (24h Range):  Temp:  [97.6 °F (36.4 °C)-99.2 °F (37.3 °C)] 97.6 °F (36.4 °C)  Pulse:  [69-97] 74  Resp:  [18-32] 18  SpO2:  [77 %-98 %] 95 %  BP: ()/(53-78) 113/71     Weight: 107.8 kg (237 lb 10.5 oz)  Body mass index is 31.35 kg/m².    SpO2: 95 %  O2 Device (Oxygen Therapy): nasal cannula    Intake/Output - Last 3 Shifts         07/04 0700  07/05 0659 07/05 0700 07/06 0659 07/06 0700  07/07 0659    P.O. 1420 500     IV Piggyback       Total Intake(mL/kg) 1420 (13.1) 500 (4.6)     Urine (mL/kg/hr) 1765 (0.7) 905 (0.3)     Stool 0 0     Chest Tube       Total Output 1765 905     Net -345 -405            Urine Occurrence  1 x     Stool Occurrence 1 x 1 x             Lines/Drains/Airways       Peripheral Intravenous Line  Duration                  Peripheral IV - Single Lumen 07/03/22 1713 20 G Left Antecubital 2 days         Peripheral IV - Single Lumen 07/05/22 0645 20 G Anterior;Left Forearm 1 day                    Physical Exam  Constitutional:       General: He is not in acute distress.  HENT:      Head: Normocephalic and atraumatic.   Eyes:      Pupils: Pupils are equal, round, and reactive to light.   Cardiovascular:      Rate and Rhythm: Normal rate and regular rhythm.      Comments: Chest tube site c/d/I   PPM   Pulmonary:      Effort: Pulmonary effort is normal. No respiratory distress.      Comments: 2L O2   Abdominal:      General: There is no distension.   Musculoskeletal:         General: Swelling (minimal) present. Normal range of motion.      Cervical back: Normal range of motion.   Skin:     Coloration: Skin is not pale.      Comments: Midline sternal incision c/d/i   Neurological:      General: No focal deficit present.      Mental Status: He is alert and oriented to person, place, and time.   Psychiatric:         Mood and Affect: Mood normal.         Behavior:  Behavior normal.       Significant Labs:  BMP:   Recent Labs   Lab 07/06/22  0558         K 4.0   CL 98   CO2 29   BUN 37*   CREATININE 1.4   CALCIUM 8.8   MG 2.2     CBC:   Recent Labs   Lab 07/06/22  0558   WBC 10.57   RBC 3.95*   HGB 9.9*   HCT 31.7*      MCV 80*   MCH 25.1*   MCHC 31.2*     CMP:   Recent Labs   Lab 07/06/22  0558      CALCIUM 8.8   ALBUMIN 2.9*   PROT 6.1      K 4.0   CO2 29   CL 98   BUN 37*   CREATININE 1.4   ALKPHOS 69   ALT 23   AST 39   BILITOT 1.4*     Coagulation: No results for input(s): PT, INR, APTT in the last 48 hours.    Significant Diagnostics:  I have reviewed all pertinent imaging results/findings within the past 24 hours.    Assessment/Plan:     CHF (congestive heart failure)  Coreg   BP too soft for ACE/ARB at this time. Patient also with cough from these medications     Hypothyroidism  Home synthroid     Acute blood loss anemia  Expected post operatively   CBC daily     S/P mitral valve repair  ASA  BB   Lasix and K   Will give extra push of lasix today and convert to PO tomorrow   Wean O2 as tolerated   Breathing treatments added  Pepcid   Mucinex   Bowel regimen   PRN pain meds   SubQ heparin   Home synthroid   TTE ordered     Stress hyperglycemia  Endocrine following     Paroxysmal atrial fibrillation  S/P MAZE   NSR on monitor       Dispo: CSU. Recs for rehab     Alice Ojeda PA-C  Cardiothoracic Surgery  [facility] - Cardiology Stepdown

## 2022-07-07 VITALS
WEIGHT: 236.75 LBS | HEIGHT: 73 IN | TEMPERATURE: 97 F | DIASTOLIC BLOOD PRESSURE: 64 MMHG | SYSTOLIC BLOOD PRESSURE: 126 MMHG | BODY MASS INDEX: 31.38 KG/M2 | OXYGEN SATURATION: 91 % | HEART RATE: 59 BPM | RESPIRATION RATE: 18 BRPM

## 2022-07-07 LAB
ANION GAP SERPL CALC-SCNC: 10 MMOL/L (ref 8–16)
BASOPHILS # BLD AUTO: 0.04 K/UL (ref 0–0.2)
BASOPHILS NFR BLD: 0.4 % (ref 0–1.9)
BUN SERPL-MCNC: 40 MG/DL (ref 8–23)
CALCIUM SERPL-MCNC: 8.7 MG/DL (ref 8.7–10.5)
CHLORIDE SERPL-SCNC: 96 MMOL/L (ref 95–110)
CO2 SERPL-SCNC: 30 MMOL/L (ref 23–29)
CREAT SERPL-MCNC: 1.5 MG/DL (ref 0.5–1.4)
DIFFERENTIAL METHOD: ABNORMAL
EOSINOPHIL # BLD AUTO: 0.3 K/UL (ref 0–0.5)
EOSINOPHIL NFR BLD: 2.7 % (ref 0–8)
ERYTHROCYTE [DISTWIDTH] IN BLOOD BY AUTOMATED COUNT: 17.2 % (ref 11.5–14.5)
EST. GFR  (AFRICAN AMERICAN): 52.3 ML/MIN/1.73 M^2
EST. GFR  (NON AFRICAN AMERICAN): 45.2 ML/MIN/1.73 M^2
GLUCOSE SERPL-MCNC: 107 MG/DL (ref 70–110)
HCT VFR BLD AUTO: 30.9 % (ref 40–54)
HGB BLD-MCNC: 9.1 G/DL (ref 14–18)
IMM GRANULOCYTES # BLD AUTO: 0.25 K/UL (ref 0–0.04)
IMM GRANULOCYTES NFR BLD AUTO: 2.4 % (ref 0–0.5)
LYMPHOCYTES # BLD AUTO: 1.2 K/UL (ref 1–4.8)
LYMPHOCYTES NFR BLD: 11 % (ref 18–48)
MAGNESIUM SERPL-MCNC: 2.3 MG/DL (ref 1.6–2.6)
MCH RBC QN AUTO: 24.3 PG (ref 27–31)
MCHC RBC AUTO-ENTMCNC: 29.4 G/DL (ref 32–36)
MCV RBC AUTO: 83 FL (ref 82–98)
MONOCYTES # BLD AUTO: 1.1 K/UL (ref 0.3–1)
MONOCYTES NFR BLD: 10.2 % (ref 4–15)
NEUTROPHILS # BLD AUTO: 7.6 K/UL (ref 1.8–7.7)
NEUTROPHILS NFR BLD: 73.3 % (ref 38–73)
NRBC BLD-RTO: 0 /100 WBC
PHOSPHATE SERPL-MCNC: 4.5 MG/DL (ref 2.7–4.5)
PLATELET # BLD AUTO: 236 K/UL (ref 150–450)
PMV BLD AUTO: 10.6 FL (ref 9.2–12.9)
POCT GLUCOSE: 118 MG/DL (ref 70–110)
POTASSIUM SERPL-SCNC: 3.8 MMOL/L (ref 3.5–5.1)
RBC # BLD AUTO: 3.74 M/UL (ref 4.6–6.2)
SODIUM SERPL-SCNC: 136 MMOL/L (ref 136–145)
WBC # BLD AUTO: 10.41 K/UL (ref 3.9–12.7)

## 2022-07-07 PROCEDURE — 25000003 PHARM REV CODE 250: Performed by: STUDENT IN AN ORGANIZED HEALTH CARE EDUCATION/TRAINING PROGRAM

## 2022-07-07 PROCEDURE — 25000003 PHARM REV CODE 250: Performed by: PHYSICIAN ASSISTANT

## 2022-07-07 PROCEDURE — 84100 ASSAY OF PHOSPHORUS: CPT | Performed by: THORACIC SURGERY (CARDIOTHORACIC VASCULAR SURGERY)

## 2022-07-07 PROCEDURE — 63600175 PHARM REV CODE 636 W HCPCS: Performed by: STUDENT IN AN ORGANIZED HEALTH CARE EDUCATION/TRAINING PROGRAM

## 2022-07-07 PROCEDURE — 83735 ASSAY OF MAGNESIUM: CPT | Performed by: THORACIC SURGERY (CARDIOTHORACIC VASCULAR SURGERY)

## 2022-07-07 PROCEDURE — 80048 BASIC METABOLIC PNL TOTAL CA: CPT | Performed by: PHYSICIAN ASSISTANT

## 2022-07-07 PROCEDURE — 36415 COLL VENOUS BLD VENIPUNCTURE: CPT | Performed by: THORACIC SURGERY (CARDIOTHORACIC VASCULAR SURGERY)

## 2022-07-07 PROCEDURE — 85025 COMPLETE CBC W/AUTO DIFF WBC: CPT | Performed by: STUDENT IN AN ORGANIZED HEALTH CARE EDUCATION/TRAINING PROGRAM

## 2022-07-07 RX ORDER — FUROSEMIDE 40 MG/1
40 TABLET ORAL 2 TIMES DAILY
Status: DISCONTINUED | OUTPATIENT
Start: 2022-07-07 | End: 2022-07-07 | Stop reason: HOSPADM

## 2022-07-07 RX ORDER — POTASSIUM CHLORIDE 750 MG/1
30 CAPSULE, EXTENDED RELEASE ORAL 2 TIMES DAILY
Status: DISCONTINUED | OUTPATIENT
Start: 2022-07-07 | End: 2022-07-07 | Stop reason: HOSPADM

## 2022-07-07 RX ADMIN — HEPARIN SODIUM 5000 UNITS: 5000 INJECTION INTRAVENOUS; SUBCUTANEOUS at 06:07

## 2022-07-07 RX ADMIN — ACETAMINOPHEN 1000 MG: 500 TABLET ORAL at 06:07

## 2022-07-07 RX ADMIN — LEVOTHYROXINE SODIUM 100 MCG: 100 TABLET ORAL at 06:07

## 2022-07-07 RX ADMIN — OXYCODONE HYDROCHLORIDE 10 MG: 10 TABLET ORAL at 09:07

## 2022-07-07 NOTE — PT/OT/SLP PROGRESS
Physical Therapy      Patient Name:  Artemio Ogden   MRN:  [id number]    Patient not seen today secondary to  (pt not seen due to being transferred to in rehab.)     7/7/2022

## 2022-07-07 NOTE — PLAN OF CARE
POC and meds reviewed with patient. Questions encouraged and answered. Patient verbalized understanding. V/S stable throughout shift; please see flowsheets for V/S information and full assessment data. NAEO. Siderails up x 2, bed locked in lowest position, call bell in reach, O2 & suction at bedside, emergency equipment at bedside; denies needs at this time.      Problem: Adult Inpatient Plan of Care  Goal: Plan of Care Review  Outcome: Ongoing, Progressing  Goal: Patient-Specific Goal (Individualized)  Outcome: Ongoing, Progressing  Goal: Absence of Hospital-Acquired Illness or Injury  Outcome: Ongoing, Progressing  Goal: Optimal Comfort and Wellbeing  Outcome: Ongoing, Progressing     Problem: Activity Intolerance (Cardiovascular Surgery)  Goal: Improved Activity Tolerance  Outcome: Ongoing, Progressing     Problem: Adjustment to Surgery (Cardiovascular Surgery)  Goal: Optimal Coping with Heart Surgery  Outcome: Ongoing, Progressing     Problem: Cardiac Function Impaired (Cardiovascular Surgery)  Goal: Effective Cardiac Function  Outcome: Ongoing, Progressing     Problem: Cerebral Tissue Perfusion (Cardiovascular Surgery)  Goal: Optimal Cerebral Tissue Perfusion (Cardiovascular Surgery)  Outcome: Ongoing, Progressing     Problem: Pain (Cardiovascular Surgery)  Goal: Acceptable Pain Control  Outcome: Ongoing, Progressing     Problem: Fall Injury Risk  Goal: Absence of Fall and Fall-Related Injury  Outcome: Ongoing, Progressing

## 2022-07-07 NOTE — PLAN OF CARE
[facility] - Cardiology Stepdown  Discharge Final Note    Primary Care Provider: Danna Lanza MD    Expected Discharge Date: 7/7/2022    Final Discharge Note (most recent)     Final Note - 07/07/22 1002        Final Note    Assessment Type Final Discharge Note     Anticipated Discharge Disposition Rehab Facility             Pt discharged to [facility] in [place].  SW left voicemail for Inge at [facility] informing her that orders have been updated.    UPDATE 10:38 AM  SW informed by Inge at [facility] that updated orders were received.    Ashlyn Sosa LMSW  [facility]  [id number]      Important Message from Medicare  Important Message from Medicare regarding Discharge Appeal Rights: Other (comments) (pt. going to Rehab)     Date IMM was signed: 07/07/22  Time IMM was signed: 0901       Future Appointments   Date Time Provider Department Center   7/28/2022  1:45 PM EKG, APPT [facility] EKG [facility]   7/28/2022  2:00 PM [facility] [id number] 485 LB LIMIT [facility] XRAY OP [facility]   7/28/2022  2:15 PM Kurtis Machado MD [facility] CARDVAS [facility]

## 2022-07-08 ENCOUNTER — TELEPHONE (OUTPATIENT)
Dept: CARDIOTHORACIC SURGERY | Facility: CLINIC | Age: 72
End: 2022-07-08
Payer: MEDICARE

## 2022-07-08 NOTE — TELEPHONE ENCOUNTER
Called pt following hospital discharge.  Reiterated the need for pt to clean his incisions everyday with soap and water, and to walk as much as he can.  Reminded pt not to drive for the first 4 weeks after surgery, and to refrain from lifting, pushing, and pulling anything greater than 5 pounds for the first 6 weeks following his surgery.  Instructed pt to perform daily weights.  Pt instructed to notify the clinic if he has a weight gain greater than 3 pounds in one day.  Pt informed of post-op appts on 8/2.  Pt instructed to call the clinic with any questions or concerns.  Pt verbalized understanding to all instructions.

## 2022-07-10 LAB — POCT GLUCOSE: 122 MG/DL (ref 70–110)

## 2022-07-11 LAB
FIO2: 60
FIO2: 65
GLUCOSE SERPL-MCNC: 131 MG/DL (ref 70–110)
GLUCOSE SERPL-MCNC: 143 MG/DL (ref 70–110)
GLUCOSE SERPL-MCNC: 150 MG/DL (ref 70–110)
GLUCOSE SERPL-MCNC: 156 MG/DL (ref 70–110)
HCO3 UR-SCNC: 24.4 MMOL/L (ref 24–28)
HCO3 UR-SCNC: 26 MMOL/L (ref 24–28)
HCO3 UR-SCNC: 27.6 MMOL/L (ref 24–28)
HCO3 UR-SCNC: 28.9 MMOL/L (ref 24–28)
HCT VFR BLD CALC: 33 %PCV (ref 36–54)
HCT VFR BLD CALC: 35 %PCV (ref 36–54)
HCT VFR BLD CALC: 36 %PCV (ref 36–54)
HCT VFR BLD CALC: 36 %PCV (ref 36–54)
LDH SERPL L TO P-CCNC: 0.73 MMOL/L (ref 0.36–1.25)
LDH SERPL L TO P-CCNC: 1.73 MMOL/L (ref 0.36–1.25)
LDH SERPL L TO P-CCNC: 5.03 MMOL/L (ref 0.36–1.25)
PCO2 BLDA: 39.7 MMHG (ref 35–45)
PCO2 BLDA: 41.5 MMHG (ref 35–45)
PCO2 BLDA: 49.6 MMHG (ref 35–45)
PCO2 BLDA: 58.7 MMHG (ref 35–45)
PH SMN: 7.28 [PH] (ref 7.35–7.45)
PH SMN: 7.37 [PH] (ref 7.35–7.45)
PH SMN: 7.4 [PH] (ref 7.35–7.45)
PH SMN: 7.41 [PH] (ref 7.35–7.45)
PO2 BLDA: 205 MMHG (ref 80–100)
PO2 BLDA: 205 MMHG (ref 80–100)
PO2 BLDA: 247 MMHG (ref 80–100)
PO2 BLDA: 55 MMHG (ref 40–60)
POC BE: 0 MMOL/L
POC BE: 1 MMOL/L
POC BE: 1 MMOL/L
POC BE: 4 MMOL/L
POC IONIZED CALCIUM: 1.05 MMOL/L (ref 1.06–1.42)
POC IONIZED CALCIUM: 1.06 MMOL/L (ref 1.06–1.42)
POC IONIZED CALCIUM: 1.06 MMOL/L (ref 1.06–1.42)
POC IONIZED CALCIUM: 1.13 MMOL/L (ref 1.06–1.42)
POC SATURATED O2: 100 % (ref 95–100)
POC SATURATED O2: 83 % (ref 95–100)
POC TCO2: 26 MMOL/L (ref 23–27)
POC TCO2: 27 MMOL/L (ref 23–27)
POC TCO2: 29 MMOL/L (ref 24–29)
POC TCO2: 30 MMOL/L (ref 23–27)
POTASSIUM BLD-SCNC: 4.6 MMOL/L (ref 3.5–5.1)
POTASSIUM BLD-SCNC: 4.7 MMOL/L (ref 3.5–5.1)
POTASSIUM BLD-SCNC: 4.9 MMOL/L (ref 3.5–5.1)
POTASSIUM BLD-SCNC: 4.9 MMOL/L (ref 3.5–5.1)
SAMPLE: ABNORMAL
SAMPLE: NORMAL
SODIUM BLD-SCNC: 140 MMOL/L (ref 136–145)
SODIUM BLD-SCNC: 141 MMOL/L (ref 136–145)

## 2022-07-12 LAB
LDH SERPL L TO P-CCNC: 4.99 MMOL/L (ref 0.36–1.25)
SAMPLE: ABNORMAL

## 2022-07-13 LAB
FINAL PATHOLOGIC DIAGNOSIS: NORMAL
Lab: NORMAL

## 2022-07-22 ENCOUNTER — TELEPHONE (OUTPATIENT)
Dept: CARDIOTHORACIC SURGERY | Facility: CLINIC | Age: 72
End: 2022-07-22
Payer: MEDICARE

## 2022-07-22 NOTE — TELEPHONE ENCOUNTER
Called pt to follow up. Pt states he is currently hospitalized at [facility] and will not be able to attend post-op appointment. Will push appointments back one week and follow up with pt next week to see how he is doing. Pt verbalized understanding.

## 2022-07-28 ENCOUNTER — TELEPHONE (OUTPATIENT)
Dept: CARDIOTHORACIC SURGERY | Facility: CLINIC | Age: 72
End: 2022-07-28
Payer: MEDICARE

## 2022-07-28 NOTE — TELEPHONE ENCOUNTER
Called pt to follow up. Pt is now home from hospital. Pt not ready to follow back up with our clinic. Will follow back up with pt next week and reschedule post op if needed. Pt verbalized understanding.

## 2022-08-10 ENCOUNTER — TELEPHONE (OUTPATIENT)
Dept: CARDIOTHORACIC SURGERY | Facility: CLINIC | Age: 72
End: 2022-08-10
Payer: MEDICARE

## 2022-08-10 NOTE — TELEPHONE ENCOUNTER
Called pt to touch base. [place] schedule a follow up at this time. Pt has been following closely with his cardiologist. Pt states it is OK if we procure recent imaging from cardiologist for our records and review. Pt states he will call when/if he is ready to follow up in the future.

## 2022-09-15 ENCOUNTER — HOSPITAL ENCOUNTER (INPATIENT)
Facility: HOSPITAL | Age: 72
LOS: 8 days | Discharge: HOME | DRG: 292 | End: 2022-09-23
Attending: INTERNAL MEDICINE | Admitting: INTERNAL MEDICINE
Payer: MEDICARE

## 2022-09-15 DIAGNOSIS — I50.43 ACUTE ON CHRONIC COMBINED SYSTOLIC AND DIASTOLIC HEART FAILURE: ICD-10-CM

## 2022-09-15 DIAGNOSIS — I50.43 ACUTE ON CHRONIC COMBINED SYSTOLIC AND DIASTOLIC CONGESTIVE HEART FAILURE: ICD-10-CM

## 2022-09-15 DIAGNOSIS — I50.9 CHF (CONGESTIVE HEART FAILURE), NYHA CLASS IV: ICD-10-CM

## 2022-09-15 DIAGNOSIS — R00.0 TACHYCARDIA: ICD-10-CM

## 2022-09-15 DIAGNOSIS — I50.9 DECOMPENSATED HEART FAILURE: ICD-10-CM

## 2022-09-15 PROCEDURE — 93005 ELECTROCARDIOGRAM TRACING: CPT

## 2022-09-15 PROCEDURE — 83605 ASSAY OF LACTIC ACID: CPT | Performed by: STUDENT IN AN ORGANIZED HEALTH CARE EDUCATION/TRAINING PROGRAM

## 2022-09-15 PROCEDURE — 93010 EKG 12-LEAD: ICD-10-PCS | Mod: ,,, | Performed by: INTERNAL MEDICINE

## 2022-09-15 PROCEDURE — 20600001 HC STEP DOWN PRIVATE ROOM

## 2022-09-15 PROCEDURE — 93010 ELECTROCARDIOGRAM REPORT: CPT | Mod: ,,, | Performed by: INTERNAL MEDICINE

## 2022-09-15 PROCEDURE — 80053 COMPREHEN METABOLIC PANEL: CPT | Performed by: STUDENT IN AN ORGANIZED HEALTH CARE EDUCATION/TRAINING PROGRAM

## 2022-09-15 PROCEDURE — 36415 COLL VENOUS BLD VENIPUNCTURE: CPT | Performed by: STUDENT IN AN ORGANIZED HEALTH CARE EDUCATION/TRAINING PROGRAM

## 2022-09-16 ENCOUNTER — DOCUMENTATION ONLY (OUTPATIENT)
Dept: CARDIOLOGY | Facility: HOSPITAL | Age: 72
End: 2022-09-16
Payer: MEDICARE

## 2022-09-16 PROBLEM — I50.43 ACUTE ON CHRONIC COMBINED SYSTOLIC AND DIASTOLIC HEART FAILURE: Status: ACTIVE | Noted: 2022-09-16

## 2022-09-16 LAB
ALBUMIN SERPL BCP-MCNC: 4 G/DL (ref 3.5–5.2)
ALBUMIN SERPL BCP-MCNC: 4.1 G/DL (ref 3.5–5.2)
ALBUMIN SERPL BCP-MCNC: 4.2 G/DL (ref 3.5–5.2)
ALP SERPL-CCNC: 78 U/L (ref 55–135)
ALP SERPL-CCNC: 85 U/L (ref 55–135)
ALP SERPL-CCNC: 97 U/L (ref 55–135)
ALT SERPL W/O P-5'-P-CCNC: 325 U/L (ref 10–44)
ALT SERPL W/O P-5'-P-CCNC: 339 U/L (ref 10–44)
ALT SERPL W/O P-5'-P-CCNC: 350 U/L (ref 10–44)
ANION GAP SERPL CALC-SCNC: 11 MMOL/L (ref 8–16)
ANION GAP SERPL CALC-SCNC: 11 MMOL/L (ref 8–16)
ANION GAP SERPL CALC-SCNC: 12 MMOL/L (ref 8–16)
ANION GAP SERPL CALC-SCNC: 14 MMOL/L (ref 8–16)
APTT BLDCRRT: 41.7 SEC (ref 21–32)
APTT BLDCRRT: 46.4 SEC (ref 21–32)
ASCENDING AORTA: 3.67 CM
AST SERPL-CCNC: 239 U/L (ref 10–40)
AST SERPL-CCNC: 274 U/L (ref 10–40)
AST SERPL-CCNC: 296 U/L (ref 10–40)
AV INDEX (PROSTH): 0.38
AV MEAN GRADIENT: 9 MMHG
AV PEAK GRADIENT: 18 MMHG
AV VALVE AREA: 1.65 CM2
AV VELOCITY RATIO: 0.35
BASOPHILS # BLD AUTO: 0.05 K/UL (ref 0–0.2)
BASOPHILS NFR BLD: 0.6 % (ref 0–1.9)
BILIRUB SERPL-MCNC: 1.5 MG/DL (ref 0.1–1)
BILIRUB SERPL-MCNC: 1.6 MG/DL (ref 0.1–1)
BILIRUB SERPL-MCNC: 1.7 MG/DL (ref 0.1–1)
BSA FOR ECHO PROCEDURE: 2.3 M2
BUN SERPL-MCNC: 26 MG/DL (ref 8–23)
BUN SERPL-MCNC: 27 MG/DL (ref 8–23)
BUN SERPL-MCNC: 28 MG/DL (ref 8–23)
BUN SERPL-MCNC: 33 MG/DL (ref 8–23)
CALCIUM SERPL-MCNC: 8.9 MG/DL (ref 8.7–10.5)
CALCIUM SERPL-MCNC: 9 MG/DL (ref 8.7–10.5)
CALCIUM SERPL-MCNC: 9.1 MG/DL (ref 8.7–10.5)
CALCIUM SERPL-MCNC: 9.3 MG/DL (ref 8.7–10.5)
CHLORIDE SERPL-SCNC: 100 MMOL/L (ref 95–110)
CHLORIDE SERPL-SCNC: 98 MMOL/L (ref 95–110)
CHLORIDE SERPL-SCNC: 99 MMOL/L (ref 95–110)
CHLORIDE SERPL-SCNC: 99 MMOL/L (ref 95–110)
CO2 SERPL-SCNC: 22 MMOL/L (ref 23–29)
CO2 SERPL-SCNC: 24 MMOL/L (ref 23–29)
CO2 SERPL-SCNC: 28 MMOL/L (ref 23–29)
CO2 SERPL-SCNC: 29 MMOL/L (ref 23–29)
CREAT SERPL-MCNC: 1.1 MG/DL (ref 0.5–1.4)
CREAT SERPL-MCNC: 1.2 MG/DL (ref 0.5–1.4)
CREAT SERPL-MCNC: 1.2 MG/DL (ref 0.5–1.4)
CREAT SERPL-MCNC: 1.3 MG/DL (ref 0.5–1.4)
CV ECHO LV RWT: 0.24 CM
DIFFERENTIAL METHOD: ABNORMAL
DOP CALC AO PEAK VEL: 2.11 M/S
DOP CALC AO VTI: 33.66 CM
DOP CALC LVOT AREA: 4.4 CM2
DOP CALC LVOT DIAMETER: 2.36 CM
DOP CALC LVOT PEAK VEL: 0.73 M/S
DOP CALC LVOT STROKE VOLUME: 55.44 CM3
DOP CALC MV VTI: 45.56 CM
DOP CALCLVOT PEAK VEL VTI: 12.68 CM
E WAVE DECELERATION TIME: 332.14 MSEC
E/A RATIO: 1.98
E/E' RATIO: 46.29 M/S
ECHO LV POSTERIOR WALL: 0.91 CM (ref 0.6–1.1)
EJECTION FRACTION: 20 %
EOSINOPHIL # BLD AUTO: 0 K/UL (ref 0–0.5)
EOSINOPHIL NFR BLD: 0.5 % (ref 0–8)
ERYTHROCYTE [DISTWIDTH] IN BLOOD BY AUTOMATED COUNT: 17.8 % (ref 11.5–14.5)
EST. GFR  (NO RACE VARIABLE): 57.6 ML/MIN/1.73 M^2
EST. GFR  (NO RACE VARIABLE): >60 ML/MIN/1.73 M^2
FRACTIONAL SHORTENING: 5 % (ref 28–44)
GLUCOSE SERPL-MCNC: 102 MG/DL (ref 70–110)
GLUCOSE SERPL-MCNC: 155 MG/DL (ref 70–110)
GLUCOSE SERPL-MCNC: 84 MG/DL (ref 70–110)
GLUCOSE SERPL-MCNC: 87 MG/DL (ref 70–110)
HCT VFR BLD AUTO: 37.4 % (ref 40–54)
HGB BLD-MCNC: 12 G/DL (ref 14–18)
IMM GRANULOCYTES # BLD AUTO: 0.02 K/UL (ref 0–0.04)
IMM GRANULOCYTES NFR BLD AUTO: 0.3 % (ref 0–0.5)
INTERVENTRICULAR SEPTUM: 0.86 CM (ref 0.6–1.1)
LA MAJOR: 7.96 CM
LA MINOR: 8.7 CM
LA WIDTH: 5.07 CM
LACTATE SERPL-SCNC: 2 MMOL/L (ref 0.5–2.2)
LACTATE SERPL-SCNC: 2.3 MMOL/L (ref 0.5–2.2)
LEFT ATRIUM SIZE: 5.54 CM
LEFT ATRIUM VOLUME INDEX MOD: 58.6 ML/M2
LEFT ATRIUM VOLUME INDEX: 87.4 ML/M2
LEFT ATRIUM VOLUME MOD: 133.13 CM3
LEFT ATRIUM VOLUME: 198.48 CM3
LEFT INTERNAL DIMENSION IN SYSTOLE: 7.07 CM (ref 2.1–4)
LEFT VENTRICLE DIASTOLIC VOLUME INDEX: 130.14 ML/M2
LEFT VENTRICLE DIASTOLIC VOLUME: 295.42 ML
LEFT VENTRICLE MASS INDEX: 137 G/M2
LEFT VENTRICLE SYSTOLIC VOLUME INDEX: 115.1 ML/M2
LEFT VENTRICLE SYSTOLIC VOLUME: 261.37 ML
LEFT VENTRICULAR INTERNAL DIMENSION IN DIASTOLE: 7.47 CM (ref 3.5–6)
LEFT VENTRICULAR MASS: 310.15 G
LV LATERAL E/E' RATIO: 40.5 M/S
LV SEPTAL E/E' RATIO: 54 M/S
LYMPHOCYTES # BLD AUTO: 1 K/UL (ref 1–4.8)
LYMPHOCYTES NFR BLD: 13.2 % (ref 18–48)
MAGNESIUM SERPL-MCNC: 2 MG/DL (ref 1.6–2.6)
MCH RBC QN AUTO: 26.1 PG (ref 27–31)
MCHC RBC AUTO-ENTMCNC: 32.1 G/DL (ref 32–36)
MCV RBC AUTO: 82 FL (ref 82–98)
MONOCYTES # BLD AUTO: 0.8 K/UL (ref 0.3–1)
MONOCYTES NFR BLD: 9.6 % (ref 4–15)
MV MEAN GRADIENT: 1 MMHG
MV PEAK A VEL: 0.82 M/S
MV PEAK E VEL: 1.62 M/S
MV PEAK GRADIENT: 13 MMHG
MV STENOSIS PRESSURE HALF TIME: 96.32 MS
MV VALVE AREA BY CONTINUITY EQUATION: 1.22 CM2
MV VALVE AREA P 1/2 METHOD: 2.28 CM2
NEUTROPHILS # BLD AUTO: 6 K/UL (ref 1.8–7.7)
NEUTROPHILS NFR BLD: 75.8 % (ref 38–73)
NRBC BLD-RTO: 0 /100 WBC
PISA MRMAX VEL: 0.05 M/S
PISA TR MAX VEL: 2.8 M/S
PLATELET # BLD AUTO: 132 K/UL (ref 150–450)
PMV BLD AUTO: 12.2 FL (ref 9.2–12.9)
POTASSIUM SERPL-SCNC: 2.9 MMOL/L (ref 3.5–5.1)
POTASSIUM SERPL-SCNC: 3 MMOL/L (ref 3.5–5.1)
POTASSIUM SERPL-SCNC: 3.1 MMOL/L (ref 3.5–5.1)
POTASSIUM SERPL-SCNC: 3.2 MMOL/L (ref 3.5–5.1)
PROT SERPL-MCNC: 6.9 G/DL (ref 6–8.4)
PROT SERPL-MCNC: 6.9 G/DL (ref 6–8.4)
PROT SERPL-MCNC: 7 G/DL (ref 6–8.4)
RA MAJOR: 7.32 CM
RA PRESSURE: 15 MMHG
RA WIDTH: 6.23 CM
RBC # BLD AUTO: 4.59 M/UL (ref 4.6–6.2)
RIGHT VENTRICULAR END-DIASTOLIC DIMENSION: 5.56 CM
RV TISSUE DOPPLER FREE WALL SYSTOLIC VELOCITY 1 (APICAL 4 CHAMBER VIEW): 249.08 CM/S
SINUS: 3.58 CM
SODIUM SERPL-SCNC: 134 MMOL/L (ref 136–145)
SODIUM SERPL-SCNC: 134 MMOL/L (ref 136–145)
SODIUM SERPL-SCNC: 139 MMOL/L (ref 136–145)
SODIUM SERPL-SCNC: 140 MMOL/L (ref 136–145)
STJ: 2.93 CM
T4 FREE SERPL-MCNC: 1.06 NG/DL (ref 0.71–1.51)
TDI LATERAL: 0.04 M/S
TDI SEPTAL: 0.03 M/S
TDI: 0.04 M/S
TR MAX PG: 31 MMHG
TRICUSPID ANNULAR PLANE SYSTOLIC EXCURSION: 1.46 CM
TSH SERPL DL<=0.005 MIU/L-ACNC: 0.26 UIU/ML (ref 0.4–4)
TV REST PULMONARY ARTERY PRESSURE: 46 MMHG
WBC # BLD AUTO: 7.85 K/UL (ref 3.9–12.7)

## 2022-09-16 PROCEDURE — 99223 1ST HOSP IP/OBS HIGH 75: CPT | Mod: AI,,, | Performed by: INTERNAL MEDICINE

## 2022-09-16 PROCEDURE — 63600175 PHARM REV CODE 636 W HCPCS: Performed by: STUDENT IN AN ORGANIZED HEALTH CARE EDUCATION/TRAINING PROGRAM

## 2022-09-16 PROCEDURE — 25000003 PHARM REV CODE 250: Performed by: INTERNAL MEDICINE

## 2022-09-16 PROCEDURE — 85730 THROMBOPLASTIN TIME PARTIAL: CPT | Mod: 91 | Performed by: INTERNAL MEDICINE

## 2022-09-16 PROCEDURE — C1751 CATH, INF, PER/CENT/MIDLINE: HCPCS

## 2022-09-16 PROCEDURE — 93010 ELECTROCARDIOGRAM REPORT: CPT | Mod: ,,, | Performed by: INTERNAL MEDICINE

## 2022-09-16 PROCEDURE — 93005 ELECTROCARDIOGRAM TRACING: CPT

## 2022-09-16 PROCEDURE — 83735 ASSAY OF MAGNESIUM: CPT | Mod: 91 | Performed by: INTERNAL MEDICINE

## 2022-09-16 PROCEDURE — 93010 EKG 12-LEAD: ICD-10-PCS | Mod: ,,, | Performed by: INTERNAL MEDICINE

## 2022-09-16 PROCEDURE — 80048 BASIC METABOLIC PNL TOTAL CA: CPT | Mod: XB | Performed by: INTERNAL MEDICINE

## 2022-09-16 PROCEDURE — 84439 ASSAY OF FREE THYROXINE: CPT | Performed by: STUDENT IN AN ORGANIZED HEALTH CARE EDUCATION/TRAINING PROGRAM

## 2022-09-16 PROCEDURE — A4216 STERILE WATER/SALINE, 10 ML: HCPCS | Performed by: INTERNAL MEDICINE

## 2022-09-16 PROCEDURE — 25000003 PHARM REV CODE 250: Performed by: PHYSICIAN ASSISTANT

## 2022-09-16 PROCEDURE — 76937 US GUIDE VASCULAR ACCESS: CPT

## 2022-09-16 PROCEDURE — 83735 ASSAY OF MAGNESIUM: CPT | Performed by: STUDENT IN AN ORGANIZED HEALTH CARE EDUCATION/TRAINING PROGRAM

## 2022-09-16 PROCEDURE — 80053 COMPREHEN METABOLIC PANEL: CPT | Performed by: STUDENT IN AN ORGANIZED HEALTH CARE EDUCATION/TRAINING PROGRAM

## 2022-09-16 PROCEDURE — 99223 PR INITIAL HOSPITAL CARE,LEVL III: ICD-10-PCS | Mod: AI,,, | Performed by: INTERNAL MEDICINE

## 2022-09-16 PROCEDURE — 36573 INSJ PICC RS&I 5 YR+: CPT

## 2022-09-16 PROCEDURE — 25000003 PHARM REV CODE 250: Performed by: STUDENT IN AN ORGANIZED HEALTH CARE EDUCATION/TRAINING PROGRAM

## 2022-09-16 PROCEDURE — 84443 ASSAY THYROID STIM HORMONE: CPT | Performed by: STUDENT IN AN ORGANIZED HEALTH CARE EDUCATION/TRAINING PROGRAM

## 2022-09-16 PROCEDURE — 83605 ASSAY OF LACTIC ACID: CPT | Performed by: STUDENT IN AN ORGANIZED HEALTH CARE EDUCATION/TRAINING PROGRAM

## 2022-09-16 PROCEDURE — 20600001 HC STEP DOWN PRIVATE ROOM

## 2022-09-16 PROCEDURE — 85025 COMPLETE CBC W/AUTO DIFF WBC: CPT | Performed by: STUDENT IN AN ORGANIZED HEALTH CARE EDUCATION/TRAINING PROGRAM

## 2022-09-16 RX ORDER — POTASSIUM CHLORIDE 750 MG/1
30 CAPSULE, EXTENDED RELEASE ORAL ONCE
Status: COMPLETED | OUTPATIENT
Start: 2022-09-16 | End: 2022-09-16

## 2022-09-16 RX ORDER — SODIUM CHLORIDE 0.9 % (FLUSH) 0.9 %
10 SYRINGE (ML) INJECTION EVERY 6 HOURS
Status: DISCONTINUED | OUTPATIENT
Start: 2022-09-16 | End: 2022-09-23

## 2022-09-16 RX ORDER — LIOTHYRONINE SODIUM 5 UG/1
10 TABLET ORAL DAILY
Status: DISCONTINUED | OUTPATIENT
Start: 2022-09-16 | End: 2022-09-23 | Stop reason: HOSPADM

## 2022-09-16 RX ORDER — ASPIRIN 325 MG
325 TABLET ORAL DAILY
Status: DISCONTINUED | OUTPATIENT
Start: 2022-09-16 | End: 2022-09-23 | Stop reason: HOSPADM

## 2022-09-16 RX ORDER — FUROSEMIDE 10 MG/ML
80 INJECTION INTRAMUSCULAR; INTRAVENOUS ONCE
Status: COMPLETED | OUTPATIENT
Start: 2022-09-16 | End: 2022-09-16

## 2022-09-16 RX ORDER — SODIUM CHLORIDE 0.9 % (FLUSH) 0.9 %
10 SYRINGE (ML) INJECTION
Status: DISCONTINUED | OUTPATIENT
Start: 2022-09-16 | End: 2022-09-23 | Stop reason: HOSPADM

## 2022-09-16 RX ORDER — POTASSIUM CHLORIDE 20 MEQ/1
40 TABLET, EXTENDED RELEASE ORAL ONCE
Status: COMPLETED | OUTPATIENT
Start: 2022-09-16 | End: 2022-09-16

## 2022-09-16 RX ORDER — CETIRIZINE HYDROCHLORIDE 10 MG/1
10 TABLET ORAL DAILY
Status: DISCONTINUED | OUTPATIENT
Start: 2022-09-16 | End: 2022-09-21

## 2022-09-16 RX ORDER — POTASSIUM CHLORIDE 20 MEQ/1
40 TABLET, EXTENDED RELEASE ORAL ONCE
Status: DISCONTINUED | OUTPATIENT
Start: 2022-09-17 | End: 2022-09-17

## 2022-09-16 RX ORDER — LEVOTHYROXINE SODIUM 100 UG/1
100 TABLET ORAL
Status: DISCONTINUED | OUTPATIENT
Start: 2022-09-16 | End: 2022-09-23 | Stop reason: HOSPADM

## 2022-09-16 RX ORDER — AMIODARONE HYDROCHLORIDE 200 MG/1
400 TABLET ORAL 2 TIMES DAILY
Status: DISCONTINUED | OUTPATIENT
Start: 2022-09-16 | End: 2022-09-23 | Stop reason: HOSPADM

## 2022-09-16 RX ORDER — DOBUTAMINE HYDROCHLORIDE 400 MG/100ML
2.5 INJECTION INTRAVENOUS CONTINUOUS
Status: DISPENSED | OUTPATIENT
Start: 2022-09-16 | End: 2022-09-20

## 2022-09-16 RX ORDER — HEPARIN SODIUM,PORCINE/D5W 25000/250
0-40 INTRAVENOUS SOLUTION INTRAVENOUS CONTINUOUS
Status: DISCONTINUED | OUTPATIENT
Start: 2022-09-16 | End: 2022-09-22

## 2022-09-16 RX ORDER — POTASSIUM CHLORIDE 20 MEQ/1
40 TABLET, EXTENDED RELEASE ORAL ONCE
Status: COMPLETED | OUTPATIENT
Start: 2022-09-17 | End: 2022-09-16

## 2022-09-16 RX ORDER — SODIUM CHLORIDE 0.9 % (FLUSH) 0.9 %
10 SYRINGE (ML) INJECTION
Status: DISCONTINUED | OUTPATIENT
Start: 2022-09-16 | End: 2022-09-23

## 2022-09-16 RX ADMIN — LEVOTHYROXINE SODIUM 100 MCG: 100 TABLET ORAL at 06:09

## 2022-09-16 RX ADMIN — FUROSEMIDE 80 MG: 10 INJECTION, SOLUTION INTRAMUSCULAR; INTRAVENOUS at 02:09

## 2022-09-16 RX ADMIN — CETIRIZINE HYDROCHLORIDE 10 MG: 10 TABLET, FILM COATED ORAL at 08:09

## 2022-09-16 RX ADMIN — ASPIRIN 325 MG ORAL TABLET 325 MG: 325 PILL ORAL at 08:09

## 2022-09-16 RX ADMIN — FUROSEMIDE 40 MG/HR: 10 INJECTION, SOLUTION INTRAMUSCULAR; INTRAVENOUS at 02:09

## 2022-09-16 RX ADMIN — POTASSIUM CHLORIDE 40 MEQ: 1500 TABLET, EXTENDED RELEASE ORAL at 11:09

## 2022-09-16 RX ADMIN — POTASSIUM CHLORIDE 40 MEQ: 1500 TABLET, EXTENDED RELEASE ORAL at 09:09

## 2022-09-16 RX ADMIN — Medication 10 ML: at 05:09

## 2022-09-16 RX ADMIN — POTASSIUM CHLORIDE 40 MEQ: 1500 TABLET, EXTENDED RELEASE ORAL at 08:09

## 2022-09-16 RX ADMIN — POTASSIUM CHLORIDE 30 MEQ: 10 CAPSULE, COATED, EXTENDED RELEASE ORAL at 08:09

## 2022-09-16 RX ADMIN — HEPARIN SODIUM 12 UNITS/KG/HR: 5000 INJECTION INTRAVENOUS; SUBCUTANEOUS at 06:09

## 2022-09-16 RX ADMIN — DOBUTAMINE HYDROCHLORIDE 5 MCG/KG/MIN: 400 INJECTION INTRAVENOUS at 05:09

## 2022-09-16 RX ADMIN — DOBUTAMINE HYDROCHLORIDE 5 MCG/KG/MIN: 400 INJECTION INTRAVENOUS at 02:09

## 2022-09-16 RX ADMIN — LIOTHYRONINE SODIUM 10 MCG: 5 TABLET ORAL at 08:09

## 2022-09-16 RX ADMIN — AMIODARONE HYDROCHLORIDE 400 MG: 200 TABLET ORAL at 08:09

## 2022-09-16 RX ADMIN — POTASSIUM CHLORIDE 40 MEQ: 1500 TABLET, EXTENDED RELEASE ORAL at 02:09

## 2022-09-16 NOTE — H&P
[facility] - Cardiology Stepdown  Heart Transplant  H&P    Patient Name: Artemio Ogden  MRN: [id number]  Admission Date: 9/15/2022  Attending Physician: Tate Roman Jr.,*  Primary Care Provider: Danna Lanza MD  Principal Problem:Acute on chronic combined systolic and diastolic heart failure    Subjective:     History of Present Illness:  Mr. Everette Ogden is a 73 y/o M w/ PMH of NICM, HFrEF (EF 15% as of 7/6/22) s/p ICD and pacemaker, severe MR s/p MV repair (6/30/22), paroxysmal AFIB s/p MAZE and left atrial appendage resection (6/30/22), cardiac tamponade s/p pericardial window (7/17/22), bilateral carotid artery stenosis, hypothyroidism and lumbar spinal stenosis who initially presented to [facility] on 9/11 endorsing worsening SOB and continuous hacking cough with pink sputum. He was admitted with acute decompensated heart failure and started on Lasix drip of 5 and dobutamine 2.5. During his hospitalization there patient underwent several procedures including a CTA for an elevated d-dimer which was negative for PE but showed a small right sided pleural effusion. Patient also had a TTE (9/12/22) and RUCHI (9/13/22) done. TTE showed EF 15-20%, LV that was moderately to severely dilated, LA that was moderately to severely dilated, RV that was moderately dilated, RA that appeared dilated, MV that moderate to severe regurgitation, AV that showed trivial regurgitation, PV with mild to moderate regurgitation, TV w/ moderate regurgitation, Pericardium w/ no effusion, PASP was upper normal, and LVIDd of 7cm. RUCHI revealed severely depressed LV systolic function, EF of 15-20%, moderate MR after failed mitral valve repair, dilated right chambers. Patient was transferred to [facility] for higher level of care for his decompensated heart failure presenting with  2.5 and Lasix 5 drip.     Of note patient is s/p MVR/MAZE/ablation at [facility] on 6/5/22 and subsequently was admitted to [place] on 7/17/22 w/ a large pericardial effusion w/ cardiac tamponade and sternal dehiscence. He underwent pericardial window, thoracostomy, and sternal debridement per Dr. Meeks. Left Heart Cath (5/20/22) showed mild to moderate CAD.     At the time of examination patient denied any SOB, chest pain, dizziness, fever, chills, abdominal pain, nausea, vomiting. He states he has been compliant with his medications and diet. This is his first hospitalization for HF since his surgery in June of 2022.     Home medications:   Atenolol 50mg daily (stopped by home cardiologist prior to transferring)  Levothyroxine 100 mcg daily  Liothyronine (T3) 5mcg daily   Aspirin 325mg daily  Lasix 40mg daily  Zyrtec 10 mg daily  Singulair 10mg daily   Amiodarone 400mg bid (started during hospitalization due to NSVT and frequent PVCs)    Of note as per patient, he has taken Coreg (SOB), metoprolol (muscle cramps, depression, visual disturbance), spironolactone (parasthesia, cramps, cough), Bystolic (edema, muscle cramps, wheezing), lisinopril (muscle cramps), and losartan (cough and muscle cramps) in the past and they were discontinued by his Cardiologist after patient reported apparent side effects as seen in parenthesis.  Patient was also on anticoagulation with Eliquis and then Xarelto but was taken off due to cramping and constipation since August 2022.     FMH: Father possible HF 2/2 viral mycarditis. Grandparents (mother) ICM.     Social: Stopped smoking and alcohol 32 yrs ago. Did have 25 yr hx of 1ppd. No drugs. Lives at home with wife of 41 years. 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 Now daughter who lives next door with children takes care of both parents.       Past Medical History:   Diagnosis Date    A-fib     Digestive disorder     reflux    Mitral regurgitation 5/31/2022    Sleep apnea     No CPAP    Thyroid disease        Past Surgical History:   Procedure  Laterality Date    ABLATION N/A 6/30/2022    Procedure: MAZE;  Surgeon: Kurtis Machado MD;  [facility];  Service: Cardiothoracic;  Laterality: N/A;    CARDIAC SURGERY      AICD    EYE SURGERY Left 04/05/2012    Cataract     EYE SURGERY Left 02/14/2013    YAG Laser    EYE SURGERY Bilateral 01/14/2011    Lasik     HERNIA REPAIR  01/01/1967    INSERTION OF INTRA-AORTIC BALLOON ASSIST DEVICE Right 6/30/2022    Procedure: INSERTION, INTRA-AORTIC BALLOON PUMP;  Surgeon: Kurtis Machado MD;  [facility];  Service: Cardiothoracic;  Laterality: Right;    LEFT HEART CATHETERIZATION Left 6/2/2022    Procedure: CATHETERIZATION, HEART, LEFT;  Surgeon: Nikhil Lechuga III, MD;  Location[facility] CATH;  Service: Cardiology;  Laterality: Left;    PROSTATE SURGERY  01/01/1999    THYROID LOBECTOMY  09/11/2006       Review of patient's allergies indicates:   Allergen Reactions    Ace inhibitors      Other reaction(s): cough    Arb-angiotensin receptor antagonist      Other reaction(s): cough       Current Facility-Administered Medications   Medication    amiodarone tablet 400 mg    aspirin tablet 325 mg    cetirizine tablet 10 mg    DOBUtamine 500 mg in D5W 125 mL infusion    furosemide (LASIX) 500 mg infusion (conc: 10 mg/mL)    heparin 25,000 units in dextrose 5% 250 mL (100 units/mL) infusion LOW INTENSITY nomogram - [facility]    levothyroxine tablet 100 mcg    liothyronine tablet 10 mcg    sodium chloride 0.9% flush 10 mL     Family History       Family history is unknown by patient.          Tobacco Use    Smoking status: Former    Smokeless tobacco: Never   Substance and Sexual Activity    Alcohol use: No    Drug use: No    Sexual activity: Yes     Partners: Female     Review of Systems 12 point ROS negative except for HPI.   Objective:       Vital Signs (Most Recent):  Temp: 98.7 °F (37.1 °C) (09/15/22 2306)  Pulse: 74 (09/15/22 2325)  Resp: 20 (09/15/22 2306)  BP: 106/72  (09/15/22 2306)  SpO2: 97 % (09/15/22 2306) Vital Signs (24h Range):  Temp:  [97.3 °F (36.3 °C)-98.7 °F (37.1 °C)] 98.7 °F (37.1 °C)  Pulse:  [66-81] 74  Resp:  [18-25] 20  SpO2:  [93 %-98 %] 97 %  BP: ()/(56-75) 106/72     Patient Vitals for the past 72 hrs (Last 3 readings):   Weight   09/15/22 2306 103.2 kg (227 lb 8.2 oz)     Body mass index is 30.02 kg/m².      Intake/Output Summary (Last 24 hours) at 9/16/2022 0312  Last data filed at 9/16/2022 0209  Gross per 24 hour   Intake 240 ml   Output 650 ml   Net -410 ml       Physical Exam    Constitutional:       Comments: Alert and oriented x3, no acute distress. Sitting up at 45 degree angle (sleeps on recliner at home).    HENT:      Head: Normocephalic and atraumatic.   Eyes:      General: Vision grossly intact. No scleral icterus.  Neck:      Comments: Elevated JVD about 16cm or greater at 45 degress. Full ROM.      Cardiovascular:      Rate and Rhythm: Normal rate and regular rhythm.    Chest shows wound dressing at sternum.   Pulmonary:      Effort: Pulmonary effort is normal.      Breath sounds: Normal breath sounds.   Abdominal:      Comments: Soft, non-tender to palpation, non-distended, liver span 10cm at mid-clavicular line, no hepatojugular reflex noted.    Musculoskeletal:      Comments: 3+ pitting edema bilaterally to below the knee.    Skin:     Comments: Dry, intact, lower extremities warm to touch. B/L hands cold to touch, decreased capillary refill.    Neurological:      Mental Status: He is alert and oriented to person, place, and time.      Comments: Normal speech.    Significant Labs:  CBC:  Recent Labs   Lab 09/09/22  0940   WBC 6.05   RBC 4.77   HGB 12.3*   HCT 39.9*      MCV 84   MCH 25.8*   MCHC 30.8*     BNP:  No results for input(s): BNP in the last 168 hours.    Invalid input(s): BNPTRIAGELBLO  CMP:  Recent Labs   Lab 09/09/22  0940 09/15/22  4329   * 87   CALCIUM 8.5 9.3   ALBUMIN 3.2* 4.2   PROT 6.1 6.9     134*   K 3.9 3.2*   CO2 27 24   CL 98 99   BUN 20 33*   CREATININE 1.01 1.2   ALKPHOS 94 85   ALT 57* 350*   AST 56 296*   BILITOT 0.7 1.5*      Coagulation:   No results for input(s): PT, INR, APTT in the last 168 hours.  LDH:  No results for input(s): LDH in the last 72 hours.  Microbiology:  Microbiology Results (last 7 days)       ** No results found for the last 168 hours. **            I have reviewed all pertinent labs within the past 24 hours.    Diagnostic Results:  I have reviewed all pertinent imaging results/findings within the past 24 hours.    Assessment/Plan:     * Acute on chronic combined systolic and diastolic heart failure  Mr. Everette Ogden is a 71 y/o M w/ PMH of NICM, HFrEF (EF 15% as of 7/6/22) s/p ICD and pacemaker, severe MR s/p MV repair (6/30/22), paroxysmal AFIB s/p MAZE and left atrial appendage resection (6/30/22), cardiac tamponade s/p pericardial window (7/17/22), bilateral carotid artery stenosis, hypothyroidism and lumbar spinal stenosis admitted for acute on chronic decompensate HF secondary to unknown cause. Possibly due to failed MV repair as noted on RUCHI at [place]. TTE (9/12/22) and RUCHI (9/13/22) done. TTE showed EF 15-20%, LV that was moderately to severely dilated, LA that was moderately to severely dilated, RV that was moderately dilated, RA that appeared dilated, MV that moderate to severe regurgitation, AV that showed trivial regurgitation, PV with mild to moderate regurgitation, TV w/ moderate regurgitation, Pericardium w/ no effusion, PASP was upper normal, and LVIDd of 7cm. Patient noted to have elevated LA of 2.3 and AST/ALT of 296 and 350 respectively. Creatinine 1.2 near baseline of 1.05. States he has been producing good urine.     - Start lasix 80mg IVP and 40mg/hr drip.   - Start  5.   - Echo ordered.   - Continue to trend CMP and lactic acid.   - Maintain K>4 and Mg>2        Paroxysmal atrial fibrillation  - restart amiodarone 400mg bid.   - start heparin  drip.     Hypothyroidism  - restart home medications levothyroxine and lithothyronine.   - TSH ordered.         Christian Hill MD  Heart Transplant  [facility] - Cardiology Stepdown

## 2022-09-16 NOTE — CONSULTS
Triple lumen PICC placed in Right basilic vein for inotropes, 41 cm in length and 0 cm exposed.  Arm circumference 30 cm lot #[id number].

## 2022-09-16 NOTE — SUBJECTIVE & OBJECTIVE
Past Medical History:   Diagnosis Date    A-fib     Digestive disorder     reflux    Mitral regurgitation 5/31/2022    Sleep apnea     No CPAP    Thyroid disease        Past Surgical History:   Procedure Laterality Date    ABLATION N/A 6/30/2022    Procedure: MAZE;  Surgeon: Kurtis Machado MD;  Location: [facility] OR [address] [place] [address];  Service: Cardiothoracic;  Laterality: N/A;    CARDIAC SURGERY      AICD    EYE SURGERY Left 04/05/2012    Cataract     EYE SURGERY Left 02/14/2013    YAG Laser    EYE SURGERY Bilateral 01/14/2011    Lasik     HERNIA REPAIR  01/01/1967    INSERTION OF INTRA-AORTIC BALLOON ASSIST DEVICE Right 6/30/2022    Procedure: INSERTION, INTRA-AORTIC BALLOON PUMP;  Surgeon: Kurtis Machado MD;  Location: [facility] OR [address] [place] [address];  Service: Cardiothoracic;  Laterality: Right;    LEFT HEART CATHETERIZATION Left 6/2/2022    Procedure: CATHETERIZATION, HEART, LEFT;  Surgeon: Nikhil Lechuga III, MD;  Location: [facility] CATH;  Service: Cardiology;  Laterality: Left;    PROSTATE SURGERY  01/01/1999    THYROID LOBECTOMY  09/11/2006       Review of patient's allergies indicates:   Allergen Reactions    Ace inhibitors      Other reaction(s): cough    Arb-angiotensin receptor antagonist      Other reaction(s): cough       Current Facility-Administered Medications   Medication    amiodarone tablet 400 mg    aspirin tablet 325 mg    cetirizine tablet 10 mg    DOBUtamine 500 mg in D5W 125 mL infusion    furosemide (LASIX) 500 mg infusion (conc: 10 mg/mL)    heparin 25,000 units in dextrose 5% 250 mL (100 units/mL) infusion LOW INTENSITY nomogram - [facility]    levothyroxine tablet 100 mcg    liothyronine tablet 10 mcg    sodium chloride 0.9% flush 10 mL     Family History       Family history is unknown by patient.          Tobacco Use    Smoking status: Former    Smokeless tobacco: Never   Substance and Sexual Activity    Alcohol use: No    Drug use: No    Sexual activity: Yes     Partners: Female     Review of Systems 12 point ROS  negative except for HPI.   Objective:       Vital Signs (Most Recent):  Temp: 98.7 °F (37.1 °C) (09/15/22 2306)  Pulse: 74 (09/15/22 2325)  Resp: 20 (09/15/22 2306)  BP: 106/72 (09/15/22 2306)  SpO2: 97 % (09/15/22 2306) Vital Signs (24h Range):  Temp:  [97.3 °F (36.3 °C)-98.7 °F (37.1 °C)] 98.7 °F (37.1 °C)  Pulse:  [66-81] 74  Resp:  [18-25] 20  SpO2:  [93 %-98 %] 97 %  BP: ()/(56-75) 106/72     Patient Vitals for the past 72 hrs (Last 3 readings):   Weight   09/15/22 2306 103.2 kg (227 lb 8.2 oz)     Body mass index is 30.02 kg/m².      Intake/Output Summary (Last 24 hours) at 9/16/2022 0312  Last data filed at 9/16/2022 0209  Gross per 24 hour   Intake 240 ml   Output 650 ml   Net -410 ml       Physical Exam    Constitutional:       Comments: Alert and oriented x3, no acute distress. Sitting up at 45 degree angle (sleeps on recliner at home).    HENT:      Head: Normocephalic and atraumatic.   Eyes:      General: Vision grossly intact. No scleral icterus.  Neck:      Comments: Elevated JVD about 16cm or greater at 45 degress. Full ROM.      Cardiovascular:      Rate and Rhythm: Normal rate and regular rhythm.    Chest shows wound dressing at sternum.   Pulmonary:      Effort: Pulmonary effort is normal.      Breath sounds: Normal breath sounds.   Abdominal:      Comments: Soft, non-tender to palpation, non-distended, liver span 10cm at mid-clavicular line, no hepatojugular reflex noted.    Musculoskeletal:      Comments: 3+ pitting edema bilaterally to below the knee.    Skin:     Comments: Dry, intact, lower extremities warm to touch. B/L hands cold to touch, decreased capillary refill.    Neurological:      Mental Status: He is alert and oriented to person, place, and time.      Comments: Normal speech.    Significant Labs:  CBC:  Recent Labs   Lab 09/09/22  0940   WBC 6.05   RBC 4.77   HGB 12.3*   HCT 39.9*      MCV 84   MCH 25.8*   MCHC 30.8*     BNP:  No results for input(s): BNP in the last  168 hours.    Invalid input(s): BNPCAIT  CMP:  Recent Labs   Lab 09/09/22  0940 09/15/22  2319   * 87   CALCIUM 8.5 9.3   ALBUMIN 3.2* 4.2   PROT 6.1 6.9    134*   K 3.9 3.2*   CO2 27 24   CL 98 99   BUN 20 33*   CREATININE 1.01 1.2   ALKPHOS 94 85   ALT 57* 350*   AST 56 296*   BILITOT 0.7 1.5*      Coagulation:   No results for input(s): PT, INR, APTT in the last 168 hours.  LDH:  No results for input(s): LDH in the last 72 hours.  Microbiology:  Microbiology Results (last 7 days)       ** No results found for the last 168 hours. **            I have reviewed all pertinent labs within the past 24 hours.    Diagnostic Results:  I have reviewed all pertinent imaging results/findings within the past 24 hours.

## 2022-09-16 NOTE — PROGRESS NOTES
Pt admitted to CSU. Pt arrived to floor via EMS transferred to CSU monitor.  VSS. NAD. No complaints at this time.  Plan of care initiated. CSU orders imitated. Liz HEATON MD notified of patient's arrival. MD at bedside. Bed in lowest position, SR up x2, call bell in reach. Will continue to monitor pt.

## 2022-09-16 NOTE — PROCEDURES
"Artemio Ogden is a 72 y.o. male patient.    Temp: 98.8 °F (37.1 °C) (09/16/22 0732)  Pulse: 77 (09/16/22 1127)  Resp: 18 (09/16/22 0732)  BP: 126/78 (09/16/22 0742)  SpO2: 99 % (09/16/22 0732)  Weight: 103 kg (227 lb) (09/16/22 0742)  Height: 6' 1" (185.4 cm) (09/16/22 0742)    PICC  Date/Time: 9/16/2022 11:20 AM  Performed by: Mary Kate Kwok RN  Assisting provider: Glenda King RN  Post-operative diagnosis: PICC line placement  Consent Done: Yes  Time out: Immediately prior to procedure a time out was called to verify the correct patient, procedure, equipment, support staff and site/side marked as required  Indications: med administration and vascular access  Anesthesia: local infiltration  Local anesthetic: lidocaine 1% without epinephrine  Anesthetic Total (mL): 3  Description of findings: PICC line placement  Preparation: skin prepped with ChloraPrep  Skin prep agent dried: skin prep agent completely dried prior to procedure  Sterile barriers: all five maximum sterile barriers used - cap, mask, sterile gown, sterile gloves, and large sterile sheet  Hand hygiene: hand hygiene performed prior to central venous catheter insertion  Location details: right basilic  Catheter type: triple lumen  Catheter size: 5 Fr  Catheter Length: 41cm    Ultrasound guidance: yes  Vessel Caliber: medium and patent, compressibility normal  Needle advanced into vessel with real time Ultrasound guidance.  Guidewire confirmed in vessel.  Image recorded and saved.  Sterile sheath used.  Number of attempts: 1  Post-procedure: blood return through all ports, chlorhexidine patch and sterile dressing applied  Technical procedures used: 3CG  Specimens: No  Implants: No  Assessment: placement verified by x-ray  Complications: none        Name JU King RN  9/16/2022    "

## 2022-09-16 NOTE — ASSESSMENT & PLAN NOTE
Mr. Everette Ogden is a 71 y/o M w/ PMH of NICM, HFrEF (EF 15% as of 7/6/22) s/p ICD and pacemaker, severe MR s/p MV repair (6/30/22), paroxysmal AFIB s/p MAZE and left atrial appendage resection (6/30/22), cardiac tamponade s/p pericardial window (7/17/22), bilateral carotid artery stenosis, hypothyroidism and lumbar spinal stenosis admitted for acute on chronic decompensate HF secondary to unknown cause. Possibly due to failed MV repair as noted on RUCHI at [place]. TTE (9/12/22) and RUCHI (9/13/22) done. TTE showed EF 15-20%, LV that was moderately to severely dilated, LA that was moderately to severely dilated, RV that was moderately dilated, RA that appeared dilated, MV that moderate to severe regurgitation, AV that showed trivial regurgitation, PV with mild to moderate regurgitation, TV w/ moderate regurgitation, Pericardium w/ no effusion, PASP was upper normal, and LVIDd of 7cm. Patient noted to have elevated LA of 2.3 and AST/ALT of 296 and 350 respectively. Creatinine 1.2 near baseline of 1.05. States he has been producing good urine.     - Start lasix 80mg IVP and 40mg/hr drip.   - Start  5.   - Echo ordered.   - Continue to trend CMP and lactic acid.   - Maintain K>4 and Mg>2

## 2022-09-16 NOTE — ASSESSMENT & PLAN NOTE
Mr. Everette Ogden is a 73 y/o M w/ PMH of NICM, HFrEF (EF 15% as of 7/6/22) s/p ICD and pacemaker, severe MR s/p MV repair (6/30/22), paroxysmal AFIB s/p MAZE and left atrial appendage resection (6/30/22), cardiac tamponade s/p pericardial window (7/17/22), bilateral carotid artery stenosis, hypothyroidism and lumbar spinal stenosis admitted for acute on chronic decompensate HF secondary to unknown cause. Possibly due to failed MV repair as noted on RUCHI at [place]. TTE (9/12/22) and RUCHI (9/13/22) done. TTE showed EF 15-20%, LV that was moderately to severely dilated, LA that was moderately to severely dilated, RV that was moderately dilated, RA that appeared dilated, MV that moderate to severe regurgitation, AV that showed trivial regurgitation, PV with mild to moderate regurgitation, TV w/ moderate regurgitation, Pericardium w/ no effusion, PASP was upper normal, and LVIDd of 7cm. Patient noted to have elevated LA of 2.3 and AST/ALT of 296 and 350 respectively. Creatinine 1.2 near baseline of 1.05. States he has been producing good urine.     - Start lasix 80mg IVP and 40mg/hr drip.   - Start  5.   - Echo ordered.   - Continue to trend CMP and lactic acid.   - Maintain K>4 and Mg>2

## 2022-09-16 NOTE — PROGRESS NOTES
Awaiting STAT lab draw according to pharmacy to start heparin gtt. Pharmacy need a platelet count prior to verification. Charge nurse notified on reason of delaying of administration of heparin gtt. Lab notified of STAT lab order. Awaiting labs arrival.

## 2022-09-16 NOTE — HPI
Mr. Everette Ogden is a 73 y/o M w/ PMH of NICM, HFrEF (EF 15% as of 7/6/22) s/p ICD and pacemaker, severe MR s/p MV repair (6/30/22), paroxysmal AFIB s/p MAZE and left atrial appendage resection (6/30/22), cardiac tamponade s/p pericardial window (7/17/22), bilateral carotid artery stenosis, hypothyroidism and lumbar spinal stenosis who initially presented to [facility] on 9/11 endorsing worsening SOB and continuous hacking cough with pink sputum. He was admitted with acute decompensated heart failure and started on Lasix drip of 5 and dobutamine 2.5. During his hospitalization there patient underwent several procedures including a CTA for an elevated d-dimer which was negative for PE but showed a small right sided pleural effusion. Patient also had a TTE (9/12/22) and RUCHI (9/13/22) done. TTE showed EF 15-20%, LV that was moderately to severely dilated, LA that was moderately to severely dilated, RV that was moderately dilated, RA that appeared dilated, MV that moderate to severe regurgitation, AV that showed trivial regurgitation, PV with mild to moderate regurgitation, TV w/ moderate regurgitation, Pericardium w/ no effusion, PASP was upper normal, and LVIDd of 7cm. RUCHI revealed severely depressed LV systolic function, EF of 15-20%, moderate MR after failed mitral valve repair, dilated right chambers. Patient was transferred to [facility] for higher level of care for his decompensated heart failure presenting with  2.5 and Lasix 5 drip.     Of note patient is s/p MVR/MAZE/ablation at [facility] on 6/5/22 and subsequently was admitted to [facility] on 7/17/22 w/ a large pericardial effusion w/ cardiac tamponade and sternal dehiscence. He underwent pericardial window, thoracostomy, and sternal debridement per Dr. Meeks. Left Heart Cath (5/20/22) showed mild to moderate CAD.     At the time of examination patient denied any SOB, chest pain, dizziness, fever, chills, abdominal  pain, nausea, vomiting. He states he has been compliant with his medications and diet. This is his first hospitalization for HF since his surgery in June of 2022.     Home medications:   Atenolol 50mg daily (stopped by home cardiologist prior to transferring)  Levothyroxine 100 mcg daily  Liothyronine (T3) 5mcg daily   Aspirin 325mg daily  Lasix 40mg daily  Zyrtec 10 mg daily  Singulair 10mg daily   Amiodarone 400mg bid (started during hospitalization due to NSVT and frequent PVCs)    Of note as per patient, he has taken Coreg (SOB), metoprolol (muscle cramps, depression, visual disturbance), spironolactone (parasthesia, cramps, cough), Bystolic (edema, muscle cramps, wheezing), lisinopril (muscle cramps), and losartan (cough and muscle cramps) in the past and they were discontinued by his Cardiologist after patient reported apparent side effects as seen in parenthesis.  Patient was also on anticoagulation with Eliquis and then Xarelto but was taken off due to cramping and constipation since August 2022.     FMH: Father possible HF 2/2 viral mycarditis. Grandparents (mother) ICM.     Social: Stopped smoking and alcohol 32 yrs ago. Did have 25 yr hx of 1ppd. No drugs. Lives at home with wife of 41 years. 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 Now daughter who lives next door with children takes care of both parents.

## 2022-09-16 NOTE — PROGRESS NOTES
Admit Note     Met with patient, daughter, and Grandchild Elliot  to assess needs. Patient is a 72 y.o.  male, admitted for for heart failure.  Pt's wife Mrs. Miles recently had stroke and still experiencing aphasia.     Patient admitted from [facility] on 9/15/2022 .  At this time, patient presents as alert and oriented x 4, pleasant, good eye contact, concentration/judgement good, calm, communicative, cooperative, and asking and answering questions appropriately.  At this time, patients caregiver presents as alert and oriented x 4, pleasant, good eye contact, recall good, concentration/judgement good, calm, communicative, and cooperative.    Household/Family Systems     Patient resides with patient's wife, at     [address]  [facility] [address].    *Pt's daughter Robyn and her  Al live next door and often spends the night in her parents home.       Support system includes     Ginger Ogden (wife)   Robyn Moe (daughter)  [phone].  Two granddaughters.        Patient does not have dependents that are need of being cared for.     Patients primary caregiver is Robyn, namita daughter, phone number [phone].  Confirmed patients contact information is [phone] (home);   Telephone Information:   Mobile [phone]   .    During admission, patient's caregiver plans to stay at home.  Confirmed patient and patients caregivers do have access to reliable transportation.    Cognitive Status/Learning     Patient reports reading ability as college and states patient does not have difficulty with reading, writing, seeing, comprehension, learning, and memory.  Pt states he has some trouble hearing. Patient reports patient learns best by doing.    Needed: No.   Highest education level: Associate/Bachelor Degree    Vocation/Disability   .  Working for Income: No  If no, reason not working: Patient Choice - Retired  Patient is retired from .    Adherence     Patient reports a medium  level of adherence to patients health care regimen. Pt states eating a low sodium diet has been difficult.  Adherence counseling and education provided. Patient verbalizes understanding.    Substance Use    Patient reports the following substance usage.    Tobacco: none, patient denies any use.  Alcohol: none, patient denies any use.  Illicit Drugs/Non-prescribed Medications: none, patient denies any use.  Patient states clear understanding of the potential impact of substance use.  Substance abstinence/cessation counseling, education and resources provided and reviewed.     Services Utilizing/ADLS    Infusion Service: Prior to admission, patient utilizing? no  Home Health: Prior to admission, patient utilizing? yes - [facility], [phone]. Contact is Rose Mary Clifford at [phone].  DME: Prior to admission, yes walker, shower chair.   Pulmonary/Cardiac Rehab: Prior to admission, no  Dialysis:  Prior to admission, no  Transplant Specialty Pharmacy:  Prior to admission, no.    Prior to admission, patient reports patient was not independent with ADLS and was not driving.  Patient reports patient is not able to care for self at this time due to compromised medical condition (as documented in medical record) and physical weakness..  Patient indicates a willingness to care for self once medically cleared to do so.    Insurance/Medications    Insured by   Payer/Plan Subscr  Sex Relation Sub. Ins. ID Effective Group Num   1. MEDICARE - [place]* GRANT ROBLES 1950 Male Self [id number]                                    [address]   2. [facility]* GRANT ROBLES 1950 Male Self [id number]                                    [address]      Primary Insurance (for UNOS reporting): Public Insurance - Medicare & Choice  Secondary Insurance (for UNOS reporting): None    Patient reports patient is able to obtain and afford medications at this time and at time of discharge.    Living  Will/Healthcare Power of     Patient states patient does not have a LW and/or HCPA.   provided education regarding LW and HCPA and the completion of forms. Pt states he is okay with his wife and daughter making medical decisions for him at this time if he is not able to.     Coping/Mental Health    Patient is coping adequately with the aid of  family members, friends, and engaging in hobbies: cooking and being outside. .  Patient indicates mental health difficulties, Pt states he has some medical trauma and also professing his own mortality.     Discharge Planning    At time of discharge, patient plans to return to patient's home under the care of [facility] .  Patients daughter will transport patient.  Per rounds today, expected discharge date has not been medically determined at this time. Patient and patients caregiver  verbalize understanding and are involved in treatment planning and discharge process.    Additional Concerns    Patient's caretaker denies additional needs and/or concerns at this time. Patient is being followed for needs, education, resources, information, emotional support, supportive counseling, and for supportive and skilled discharge plan of care.  providing ongoing psychosocial support, education, resources and d/c planning as needed.  SW remains available.  provided resource list, patient choice, psychosocial and supportive counseling, resources, education, assistance and discharge planning with patient and caregiver involvement, ongoing SW availability and services as appropriate.  remains available. Patient's caregiver verbalizes understanding and agreement with information reviewed,  availability and how to access available resources as needed. Patient denies additional needs and/or concerns at this time. Patient verbalizes understanding and agreement with information reviewed, social work availability, and how to  access available resources as needed.

## 2022-09-16 NOTE — H&P
[facility] - Cardiology Stepdown  Heart Transplant  H&P    Patient Name: Artemio Ogden  MRN: [id number]  Admission Date: 9/15/2022  Attending Physician: Tate Roman Jr.,*  Primary Care Provider: Danna Lanza MD  Principal Problem:Acute on chronic combined systolic and diastolic heart failure    Subjective:     History of Present Illness:  Mr. Everette Ogden is a 71 y/o M w/ PMH of NICM, HFrEF (EF 15% as of 7/6/22) s/p ICD and pacemaker, severe MR s/p MV repair (6/30/22), paroxysmal AFIB s/p MAZE and left atrial appendage resection (6/30/22), cardiac tamponade s/p pericardial window (7/17/22), bilateral carotid artery stenosis, hypothyroidism and lumbar spinal stenosis who initially presented to [facility] on 9/11 endorsing worsening SOB and continuous hacking cough with pink sputum. He was admitted with acute decompensated heart failure and started on Lasix drip of 5 and dobutamine 2.5. During his hospitalization there patient underwent several procedures including a CTA for an elevated d-dimer which was negative for PE but showed a small right sided pleural effusion. Patient also had a TTE (9/12/22) and RUCHI (9/13/22) done. TTE showed EF 15-20%, LV that was moderately to severely dilated, LA that was moderately to severely dilated, RV that was moderately dilated, RA that appeared dilated, MV that moderate to severe regurgitation, AV that showed trivial regurgitation, PV with mild to moderate regurgitation, TV w/ moderate regurgitation, Pericardium w/ no effusion, PASP was upper normal, and LVIDd of 7cm. RUCHI revealed severely depressed LV systolic function, EF of 15-20%, moderate MR after failed mitral valve repair, dilated right chambers. Patient was transferred to [facility] for higher level of care for his decompensated heart failure presenting with  2.5 and Lasix 5 drip.     Of note patient is s/p MVR/MAZE/ablation at [facility] on 6/5/22 and subsequently was admitted to [facility] on 7/17/22 w/ a large pericardial effusion w/ cardiac tamponade and sternal dehiscence. He underwent pericardial window, thoracostomy, and sternal debridement per Dr. Meeks. Left Heart Cath (5/20/22) showed mild to moderate CAD.     At the time of examination patient denied any SOB, chest pain, dizziness, fever, chills, abdominal pain, nausea, vomiting. He states he has been compliant with his medications and diet. This is his first hospitalization for HF since his surgery in June of 2022.     Home medications:   Atenolol 50mg daily (stopped by home cardiologist prior to transferring)  Levothyroxine 100 mcg daily  Liothyronine (T3) 5mcg daily   Aspirin 325mg daily  Lasix 40mg daily  Zyrtec 10 mg daily  Singulair 10mg daily   Amiodarone 400mg bid (started during hospitalization due to NSVT and frequent PVCs)    Of note as per patient, he has taken Coreg (SOB), metoprolol (muscle cramps, depression, visual disturbance), spironolactone (parasthesia, cramps, cough), Bystolic (edema, muscle cramps, wheezing), lisinopril (muscle cramps), and losartan (cough and muscle cramps) in the past and they were discontinued by his Cardiologist after patient reported apparent side effects as seen in parenthesis.  Patient was also on anticoagulation with Eliquis and then Xarelto but was taken off due to cramping and constipation since August 2022.     FMH: Father possible HF 2/2 viral mycarditis. Grandparents (mother) ICM.     Social: Stopped smoking and alcohol 32 yrs ago. Did have 25 yr hx of 1ppd. No drugs. Lives at home with wife of 41 years. 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 Now daughter who lives next door with children takes care of both parents.       Past Medical History:   Diagnosis Date    A-fib     Digestive disorder     reflux    Mitral regurgitation 5/31/2022    Sleep apnea     No CPAP    Thyroid disease        Past Surgical History:   Procedure  Laterality Date    ABLATION N/A 6/30/2022    Procedure: MAZE;  Surgeon: Kurtis Machado MD;  [facility];  Service: Cardiothoracic;  Laterality: N/A;    CARDIAC SURGERY      AICD    EYE SURGERY Left 04/05/2012    Cataract     EYE SURGERY Left 02/14/2013    YAG Laser    EYE SURGERY Bilateral 01/14/2011    Lasik     HERNIA REPAIR  01/01/1967    INSERTION OF INTRA-AORTIC BALLOON ASSIST DEVICE Right 6/30/2022    Procedure: INSERTION, INTRA-AORTIC BALLOON PUMP;  Surgeon: Kurtis Machado MD;  [facility];  Service: Cardiothoracic;  Laterality: Right;    LEFT HEART CATHETERIZATION Left 6/2/2022    Procedure: CATHETERIZATION, HEART, LEFT;  Surgeon: Nikhil Lechuga III, MD;  Location: [facility] CATH;  Service: Cardiology;  Laterality: Left;    PROSTATE SURGERY  01/01/1999    THYROID LOBECTOMY  09/11/2006       Review of patient's allergies indicates:   Allergen Reactions    Ace inhibitors      Other reaction(s): cough    Arb-angiotensin receptor antagonist      Other reaction(s): cough       Current Facility-Administered Medications   Medication    amiodarone tablet 400 mg    aspirin tablet 325 mg    cetirizine tablet 10 mg    DOBUtamine 500 mg in D5W 125 mL infusion    furosemide (LASIX) 500 mg infusion (conc: 10 mg/mL)    heparin 25,000 units in dextrose 5% 250 mL (100 units/mL) infusion LOW INTENSITY nomogram - [facility]    levothyroxine tablet 100 mcg    liothyronine tablet 10 mcg    sodium chloride 0.9% flush 10 mL     Family History       Family history is unknown by patient.          Tobacco Use    Smoking status: Former    Smokeless tobacco: Never   Substance and Sexual Activity    Alcohol use: No    Drug use: No    Sexual activity: Yes     Partners: Female     Review of Systems 12 point ROS negative except for HPI.   Objective:     Vital Signs (Most Recent):  Temp: 98.7 °F (37.1 °C) (09/15/22 2306)  Pulse: 74 (09/15/22 2325)  Resp: 20 (09/15/22 2306)  BP: 106/72  (09/15/22 2306)  SpO2: 97 % (09/15/22 2306) Vital Signs (24h Range):  Temp:  [97.3 °F (36.3 °C)-98.7 °F (37.1 °C)] 98.7 °F (37.1 °C)  Pulse:  [66-81] 74  Resp:  [18-25] 20  SpO2:  [93 %-98 %] 97 %  BP: ()/(56-75) 106/72     Patient Vitals for the past 72 hrs (Last 3 readings):   Weight   09/15/22 2306 103.2 kg (227 lb 8.2 oz)     Body mass index is 30.02 kg/m².      Intake/Output Summary (Last 24 hours) at 9/16/2022 0219  Last data filed at 9/16/2022 0209  Gross per 24 hour   Intake 240 ml   Output 650 ml   Net -410 ml       Physical Exam  Constitutional:       Comments: Alert and oriented x3, no acute distress. Sitting up at 45 degree angle (sleeps on recliner at home).    HENT:      Head: Normocephalic and atraumatic.   Eyes:      General: Vision grossly intact. No scleral icterus.  Neck:      Comments: Elevated JVD about 16cm or greater at 45 degress. Full ROM.     Cardiovascular:      Rate and Rhythm: Normal rate and regular rhythm.   Pulmonary:      Effort: Pulmonary effort is normal.      Breath sounds: Normal breath sounds.   Abdominal:      Comments: Soft, non-tender to palpation, non-distended, liver span 10cm at mid-clavicular line, no hepatojugular reflex noted.    Musculoskeletal:      Comments: 3+ pitting edema bilaterally to below the knee.    Skin:     Comments: Dry, intact, lower extremities warm to touch. B/L hands cold to touch, decreased capillary refill.    Neurological:      Mental Status: He is alert and oriented to person, place, and time.      Comments: Normal speech.        Significant Labs:  CBC:  Recent Labs   Lab 09/09/22 0940   WBC 6.05   RBC 4.77   HGB 12.3*   HCT 39.9*      MCV 84   MCH 25.8*   MCHC 30.8*     BNP:  No results for input(s): BNP in the last 168 hours.    Invalid input(s): BNPTRIAGELBLO  CMP:  Recent Labs   Lab 09/09/22 0940 09/15/22  2319   * 87   CALCIUM 8.5 9.3   ALBUMIN 3.2* 4.2   PROT 6.1 6.9    134*   K 3.9 3.2*   CO2 27 24   CL 98 99    BUN 20 33*   CREATININE 1.01 1.2   ALKPHOS 94 85   ALT 57* 350*   AST 56 296*   BILITOT 0.7 1.5*      Coagulation:   No results for input(s): PT, INR, APTT in the last 168 hours.  LDH:  No results for input(s): LDH in the last 72 hours.  Microbiology:  Microbiology Results (last 7 days)       ** No results found for the last 168 hours. **            BMP:   Recent Labs   Lab 09/15/22  2319   GLU 87   *   K 3.2*   CL 99   CO2 24   BUN 33*   CREATININE 1.2   CALCIUM 9.3     I have reviewed all pertinent labs within the past 24 hours.    Diagnostic Results:  I have reviewed all pertinent imaging results/findings within the past 24 hours.    Assessment/Plan:     * Acute on chronic combined systolic and diastolic heart failure  Mr. Everette Ogden is a 73 y/o M w/ PMH of NICM, HFrEF (EF 15% as of 7/6/22) s/p ICD and pacemaker, severe MR s/p MV repair (6/30/22), paroxysmal AFIB s/p MAZE and left atrial appendage resection (6/30/22), cardiac tamponade s/p pericardial window (7/17/22), bilateral carotid artery stenosis, hypothyroidism and lumbar spinal stenosis admitted for acute on chronic decompensate HF secondary to unknown cause. Possibly due to failed MV repair as noted on RUCHI at [place]. TTE (9/12/22) and RUCHI (9/13/22) done. TTE showed EF 15-20%, LV that was moderately to severely dilated, LA that was moderately to severely dilated, RV that was moderately dilated, RA that appeared dilated, MV that moderate to severe regurgitation, AV that showed trivial regurgitation, PV with mild to moderate regurgitation, TV w/ moderate regurgitation, Pericardium w/ no effusion, PASP was upper normal, and LVIDd of 7cm. Patient noted to have elevated LA of 2.3 and AST/ALT of 296 and 350 respectively. Creatinine 1.2 near baseline of 1.05. States he has been producing good urine.     - Start lasix 80mg IVP and 40mg/hr drip.   - Start  5.   - Echo ordered.   - Continue to trend CMP and lactic acid.   - Maintain K>4 and  Mg>2        Paroxysmal atrial fibrillation  - restart amiodarone 400mg bid.   - start heparin drip.     Hypothyroidism  - restart home medications levothyroxine and lithothyronine.   - TSH ordered.         Christian Hill MD  Heart Transplant  [facility] - Cardiology Stepdown

## 2022-09-16 NOTE — ASSESSMENT & PLAN NOTE
Mr. Everette Ogden is a 71 y/o M w/ PMH of NICM, HFrEF (EF 15% as of 7/6/22) s/p ICD and pacemaker, severe MR s/p MV repair (6/30/22), paroxysmal AFIB s/p MAZE and left atrial appendage resection (6/30/22), cardiac tamponade s/p pericardial window (7/17/22), bilateral carotid artery stenosis, hypothyroidism and lumbar spinal stenosis admitted for acute on chronic decompensate HF secondary to unknown cause. Possibly due to failed MV repair as noted on RUCHI at [place]. TTE (9/12/22) and RUCHI (9/13/22) done. TTE showed EF 15-20%, LV that was moderately to severely dilated, LA that was moderately to severely dilated, RV that was moderately dilated, RA that appeared dilated, MV that moderate to severe regurgitation, AV that showed trivial regurgitation, PV with mild to moderate regurgitation, TV w/ moderate regurgitation, Pericardium w/ no effusion, PASP was upper normal, and LVIDd of 7cm. Patient noted to have elevated LA of 2.3 and AST/ALT of 296 and 350 respectively. Creatinine 1.2 near baseline of 1.05. States he has been producing good urine.     - Diuresing well. Will decrease gtt to 20mg/hr and start entresto.   - Cont  5.   -GDMT, Add entresto and spironolactone.   - Echo 9/16: EF 20, Mild-Mod MR, Mod-sev TR, LVIdd 7.47, TAPSE 1.46.   - Maintain K>4 and Mg>2

## 2022-09-16 NOTE — SUBJECTIVE & OBJECTIVE
Past Medical History:   Diagnosis Date    A-fib     Digestive disorder     reflux    Mitral regurgitation 5/31/2022    Sleep apnea     No CPAP    Thyroid disease        Past Surgical History:   Procedure Laterality Date    ABLATION N/A 6/30/2022    Procedure: MAZE;  Surgeon: Kurtis Machado MD;  Location: [facility] OR [address] [place] [address];  Service: Cardiothoracic;  Laterality: N/A;    CARDIAC SURGERY      AICD    EYE SURGERY Left 04/05/2012    Cataract     EYE SURGERY Left 02/14/2013    YAG Laser    EYE SURGERY Bilateral 01/14/2011    Lasik     HERNIA REPAIR  01/01/1967    INSERTION OF INTRA-AORTIC BALLOON ASSIST DEVICE Right 6/30/2022    Procedure: INSERTION, INTRA-AORTIC BALLOON PUMP;  Surgeon: Kurtis Machado MD;  Location: [facility] OR [address] [place] [address];  Service: Cardiothoracic;  Laterality: Right;    LEFT HEART CATHETERIZATION Left 6/2/2022    Procedure: CATHETERIZATION, HEART, LEFT;  Surgeon: Nikhil Lechuga III, MD;  [facility] C[facility];  Service: Cardiology;  Laterality: Left;    PROSTATE SURGERY  01/01/1999    THYROID LOBECTOMY  09/11/2006       Review of patient's allergies indicates:   Allergen Reactions    Ace inhibitors      Other reaction(s): cough    Arb-angiotensin receptor antagonist      Other reaction(s): cough       Current Facility-Administered Medications   Medication    amiodarone tablet 400 mg    aspirin tablet 325 mg    cetirizine tablet 10 mg    DOBUtamine 500 mg in D5W 125 mL infusion    furosemide (LASIX) 500 mg infusion (conc: 10 mg/mL)    heparin 25,000 units in dextrose 5% 250 mL (100 units/mL) infusion LOW INTENSITY nomogram - [facility]    levothyroxine tablet 100 mcg    liothyronine tablet 10 mcg    sodium chloride 0.9% flush 10 mL     Family History       Family history is unknown by patient.          Tobacco Use    Smoking status: Former    Smokeless tobacco: Never   Substance and Sexual Activity    Alcohol use: No    Drug use: No    Sexual activity: Yes     Partners: Female     Review of Systems 12 point ROS  negative except for HPI.   Objective:     Vital Signs (Most Recent):  Temp: 98.7 °F (37.1 °C) (09/15/22 2306)  Pulse: 74 (09/15/22 2325)  Resp: 20 (09/15/22 2306)  BP: 106/72 (09/15/22 2306)  SpO2: 97 % (09/15/22 2306) Vital Signs (24h Range):  Temp:  [97.3 °F (36.3 °C)-98.7 °F (37.1 °C)] 98.7 °F (37.1 °C)  Pulse:  [66-81] 74  Resp:  [18-25] 20  SpO2:  [93 %-98 %] 97 %  BP: ()/(56-75) 106/72     Patient Vitals for the past 72 hrs (Last 3 readings):   Weight   09/15/22 2306 103.2 kg (227 lb 8.2 oz)     Body mass index is 30.02 kg/m².      Intake/Output Summary (Last 24 hours) at 9/16/2022 0219  Last data filed at 9/16/2022 0209  Gross per 24 hour   Intake 240 ml   Output 650 ml   Net -410 ml       Physical Exam  Constitutional:       Comments: Alert and oriented x3, no acute distress. Sitting up at 45 degree angle (sleeps on recliner at home).    HENT:      Head: Normocephalic and atraumatic.   Eyes:      General: Vision grossly intact. No scleral icterus.  Neck:      Comments: Elevated JVD about 16cm or greater at 45 degress. Full ROM.     Cardiovascular:      Rate and Rhythm: Normal rate and regular rhythm.   Pulmonary:      Effort: Pulmonary effort is normal.      Breath sounds: Normal breath sounds.   Abdominal:      Comments: Soft, non-tender to palpation, non-distended, liver span 10cm at mid-clavicular line, no hepatojugular reflex noted.    Musculoskeletal:      Comments: 3+ pitting edema bilaterally to below the knee.    Skin:     Comments: Dry, intact, lower extremities warm to touch. B/L hands cold to touch, decreased capillary refill.    Neurological:      Mental Status: He is alert and oriented to person, place, and time.      Comments: Normal speech.        Significant Labs:  CBC:  Recent Labs   Lab 09/09/22  0940   WBC 6.05   RBC 4.77   HGB 12.3*   HCT 39.9*      MCV 84   MCH 25.8*   MCHC 30.8*     BNP:  No results for input(s): BNP in the last 168 hours.    Invalid input(s):  BNPTRIAGELBLO  CMP:  Recent Labs   Lab 09/09/22  0940 09/15/22  2319   * 87   CALCIUM 8.5 9.3   ALBUMIN 3.2* 4.2   PROT 6.1 6.9    134*   K 3.9 3.2*   CO2 27 24   CL 98 99   BUN 20 33*   CREATININE 1.01 1.2   ALKPHOS 94 85   ALT 57* 350*   AST 56 296*   BILITOT 0.7 1.5*      Coagulation:   No results for input(s): PT, INR, APTT in the last 168 hours.  LDH:  No results for input(s): LDH in the last 72 hours.  Microbiology:  Microbiology Results (last 7 days)       ** No results found for the last 168 hours. **            BMP:   Recent Labs   Lab 09/15/22  2319   GLU 87   *   K 3.2*   CL 99   CO2 24   BUN 33*   CREATININE 1.2   CALCIUM 9.3     I have reviewed all pertinent labs within the past 24 hours.    Diagnostic Results:  I have reviewed all pertinent imaging results/findings within the past 24 hours.

## 2022-09-17 LAB
ALBUMIN SERPL BCP-MCNC: 3.9 G/DL (ref 3.5–5.2)
ALBUMIN SERPL BCP-MCNC: 4.3 G/DL (ref 3.5–5.2)
ALP SERPL-CCNC: 104 U/L (ref 55–135)
ALP SERPL-CCNC: 94 U/L (ref 55–135)
ALT SERPL W/O P-5'-P-CCNC: 302 U/L (ref 10–44)
ALT SERPL W/O P-5'-P-CCNC: 336 U/L (ref 10–44)
ANION GAP SERPL CALC-SCNC: 13 MMOL/L (ref 8–16)
ANION GAP SERPL CALC-SCNC: 13 MMOL/L (ref 8–16)
APTT BLDCRRT: 49.3 SEC (ref 21–32)
AST SERPL-CCNC: 181 U/L (ref 10–40)
AST SERPL-CCNC: 226 U/L (ref 10–40)
BASOPHILS # BLD AUTO: 0.05 K/UL (ref 0–0.2)
BASOPHILS NFR BLD: 0.7 % (ref 0–1.9)
BILIRUB SERPL-MCNC: 2 MG/DL (ref 0.1–1)
BILIRUB SERPL-MCNC: 2.2 MG/DL (ref 0.1–1)
BUN SERPL-MCNC: 22 MG/DL (ref 8–23)
BUN SERPL-MCNC: 25 MG/DL (ref 8–23)
CALCIUM SERPL-MCNC: 9.2 MG/DL (ref 8.7–10.5)
CALCIUM SERPL-MCNC: 9.4 MG/DL (ref 8.7–10.5)
CHLORIDE SERPL-SCNC: 97 MMOL/L (ref 95–110)
CHLORIDE SERPL-SCNC: 99 MMOL/L (ref 95–110)
CO2 SERPL-SCNC: 25 MMOL/L (ref 23–29)
CO2 SERPL-SCNC: 27 MMOL/L (ref 23–29)
CREAT SERPL-MCNC: 1.1 MG/DL (ref 0.5–1.4)
CREAT SERPL-MCNC: 1.3 MG/DL (ref 0.5–1.4)
DIFFERENTIAL METHOD: ABNORMAL
EOSINOPHIL # BLD AUTO: 0.1 K/UL (ref 0–0.5)
EOSINOPHIL NFR BLD: 1.5 % (ref 0–8)
ERYTHROCYTE [DISTWIDTH] IN BLOOD BY AUTOMATED COUNT: 17.8 % (ref 11.5–14.5)
EST. GFR  (NO RACE VARIABLE): 57.6 ML/MIN/1.73 M^2
EST. GFR  (NO RACE VARIABLE): >60 ML/MIN/1.73 M^2
GLUCOSE SERPL-MCNC: 103 MG/DL (ref 70–110)
GLUCOSE SERPL-MCNC: 286 MG/DL (ref 70–110)
HCT VFR BLD AUTO: 39.2 % (ref 40–54)
HGB BLD-MCNC: 12.6 G/DL (ref 14–18)
IMM GRANULOCYTES # BLD AUTO: 0.03 K/UL (ref 0–0.04)
IMM GRANULOCYTES NFR BLD AUTO: 0.4 % (ref 0–0.5)
LYMPHOCYTES # BLD AUTO: 1 K/UL (ref 1–4.8)
LYMPHOCYTES NFR BLD: 13.7 % (ref 18–48)
MAGNESIUM SERPL-MCNC: 1.9 MG/DL (ref 1.6–2.6)
MAGNESIUM SERPL-MCNC: 1.9 MG/DL (ref 1.6–2.6)
MCH RBC QN AUTO: 25.9 PG (ref 27–31)
MCHC RBC AUTO-ENTMCNC: 32.1 G/DL (ref 32–36)
MCV RBC AUTO: 81 FL (ref 82–98)
MONOCYTES # BLD AUTO: 0.7 K/UL (ref 0.3–1)
MONOCYTES NFR BLD: 9.5 % (ref 4–15)
NEUTROPHILS # BLD AUTO: 5.4 K/UL (ref 1.8–7.7)
NEUTROPHILS NFR BLD: 74.2 % (ref 38–73)
NRBC BLD-RTO: 0 /100 WBC
PLATELET # BLD AUTO: 181 K/UL (ref 150–450)
PMV BLD AUTO: 10.4 FL (ref 9.2–12.9)
POTASSIUM SERPL-SCNC: 3.3 MMOL/L (ref 3.5–5.1)
POTASSIUM SERPL-SCNC: 3.3 MMOL/L (ref 3.5–5.1)
PROT SERPL-MCNC: 7.2 G/DL (ref 6–8.4)
PROT SERPL-MCNC: 7.6 G/DL (ref 6–8.4)
RBC # BLD AUTO: 4.86 M/UL (ref 4.6–6.2)
SODIUM SERPL-SCNC: 135 MMOL/L (ref 136–145)
SODIUM SERPL-SCNC: 139 MMOL/L (ref 136–145)
WBC # BLD AUTO: 7.25 K/UL (ref 3.9–12.7)

## 2022-09-17 PROCEDURE — 25000003 PHARM REV CODE 250: Performed by: INTERNAL MEDICINE

## 2022-09-17 PROCEDURE — 80053 COMPREHEN METABOLIC PANEL: CPT | Performed by: STUDENT IN AN ORGANIZED HEALTH CARE EDUCATION/TRAINING PROGRAM

## 2022-09-17 PROCEDURE — 83735 ASSAY OF MAGNESIUM: CPT | Performed by: STUDENT IN AN ORGANIZED HEALTH CARE EDUCATION/TRAINING PROGRAM

## 2022-09-17 PROCEDURE — 99233 SBSQ HOSP IP/OBS HIGH 50: CPT | Mod: ,,, | Performed by: NURSE PRACTITIONER

## 2022-09-17 PROCEDURE — 63600175 PHARM REV CODE 636 W HCPCS: Performed by: STUDENT IN AN ORGANIZED HEALTH CARE EDUCATION/TRAINING PROGRAM

## 2022-09-17 PROCEDURE — 63600175 PHARM REV CODE 636 W HCPCS: Performed by: NURSE PRACTITIONER

## 2022-09-17 PROCEDURE — A4216 STERILE WATER/SALINE, 10 ML: HCPCS | Performed by: INTERNAL MEDICINE

## 2022-09-17 PROCEDURE — 25000003 PHARM REV CODE 250: Performed by: NURSE PRACTITIONER

## 2022-09-17 PROCEDURE — 85025 COMPLETE CBC W/AUTO DIFF WBC: CPT | Performed by: STUDENT IN AN ORGANIZED HEALTH CARE EDUCATION/TRAINING PROGRAM

## 2022-09-17 PROCEDURE — 20600001 HC STEP DOWN PRIVATE ROOM

## 2022-09-17 PROCEDURE — 99233 PR SUBSEQUENT HOSPITAL CARE,LEVL III: ICD-10-PCS | Mod: ,,, | Performed by: NURSE PRACTITIONER

## 2022-09-17 PROCEDURE — 85730 THROMBOPLASTIN TIME PARTIAL: CPT | Performed by: STUDENT IN AN ORGANIZED HEALTH CARE EDUCATION/TRAINING PROGRAM

## 2022-09-17 PROCEDURE — 25000003 PHARM REV CODE 250: Performed by: STUDENT IN AN ORGANIZED HEALTH CARE EDUCATION/TRAINING PROGRAM

## 2022-09-17 RX ORDER — SPIRONOLACTONE 25 MG/1
25 TABLET ORAL DAILY
Status: DISCONTINUED | OUTPATIENT
Start: 2022-09-17 | End: 2022-09-23 | Stop reason: HOSPADM

## 2022-09-17 RX ORDER — POTASSIUM CHLORIDE 20 MEQ/1
60 TABLET, EXTENDED RELEASE ORAL ONCE
Status: COMPLETED | OUTPATIENT
Start: 2022-09-17 | End: 2022-09-17

## 2022-09-17 RX ORDER — BENZONATATE 100 MG/1
100 CAPSULE ORAL 3 TIMES DAILY PRN
Status: DISCONTINUED | OUTPATIENT
Start: 2022-09-17 | End: 2022-09-23 | Stop reason: HOSPADM

## 2022-09-17 RX ORDER — POTASSIUM CHLORIDE 20 MEQ/1
40 TABLET, EXTENDED RELEASE ORAL 3 TIMES DAILY
Status: DISCONTINUED | OUTPATIENT
Start: 2022-09-17 | End: 2022-09-19

## 2022-09-17 RX ADMIN — Medication 10 ML: at 11:09

## 2022-09-17 RX ADMIN — FUROSEMIDE 40 MG/HR: 10 INJECTION, SOLUTION INTRAMUSCULAR; INTRAVENOUS at 12:09

## 2022-09-17 RX ADMIN — BENZONATATE 100 MG: 100 CAPSULE ORAL at 05:09

## 2022-09-17 RX ADMIN — SPIRONOLACTONE 25 MG: 25 TABLET, FILM COATED ORAL at 09:09

## 2022-09-17 RX ADMIN — Medication 10 ML: at 12:09

## 2022-09-17 RX ADMIN — CETIRIZINE HYDROCHLORIDE 10 MG: 10 TABLET, FILM COATED ORAL at 09:09

## 2022-09-17 RX ADMIN — AMIODARONE HYDROCHLORIDE 400 MG: 200 TABLET ORAL at 08:09

## 2022-09-17 RX ADMIN — HEPARIN SODIUM 12 UNITS/KG/HR: 5000 INJECTION INTRAVENOUS; SUBCUTANEOUS at 10:09

## 2022-09-17 RX ADMIN — BENZONATATE 100 MG: 100 CAPSULE ORAL at 10:09

## 2022-09-17 RX ADMIN — AMIODARONE HYDROCHLORIDE 400 MG: 200 TABLET ORAL at 09:09

## 2022-09-17 RX ADMIN — FUROSEMIDE 20 MG/HR: 10 INJECTION, SOLUTION INTRAMUSCULAR; INTRAVENOUS at 05:09

## 2022-09-17 RX ADMIN — ASPIRIN 325 MG ORAL TABLET 325 MG: 325 PILL ORAL at 09:09

## 2022-09-17 RX ADMIN — POTASSIUM CHLORIDE 40 MEQ: 1500 TABLET, EXTENDED RELEASE ORAL at 05:09

## 2022-09-17 RX ADMIN — POTASSIUM CHLORIDE 40 MEQ: 1500 TABLET, EXTENDED RELEASE ORAL at 09:09

## 2022-09-17 RX ADMIN — Medication 10 ML: at 06:09

## 2022-09-17 RX ADMIN — LIOTHYRONINE SODIUM 10 MCG: 5 TABLET ORAL at 09:09

## 2022-09-17 RX ADMIN — LEVOTHYROXINE SODIUM 100 MCG: 100 TABLET ORAL at 05:09

## 2022-09-17 RX ADMIN — POTASSIUM CHLORIDE 60 MEQ: 1500 TABLET, EXTENDED RELEASE ORAL at 12:09

## 2022-09-17 RX ADMIN — SACUBITRIL AND VALSARTAN 1 TABLET: 24; 26 TABLET, FILM COATED ORAL at 09:09

## 2022-09-17 RX ADMIN — POTASSIUM CHLORIDE 40 MEQ: 1500 TABLET, EXTENDED RELEASE ORAL at 08:09

## 2022-09-17 RX ADMIN — DOBUTAMINE HYDROCHLORIDE 5 MCG/KG/MIN: 400 INJECTION INTRAVENOUS at 10:09

## 2022-09-17 RX ADMIN — Medication 10 ML: at 05:09

## 2022-09-17 NOTE — PROGRESS NOTES
Patient having short runs of vtach. Patient asymptomatic.MD Liz notified. Will draw STAT BMP and MG.

## 2022-09-17 NOTE — SUBJECTIVE & OBJECTIVE
Interval History: Feeling better. Diuresing extremely well.     Continuous Infusions:   DOBUTamine IV infusion (non-titrating) 5 mcg/kg/min (09/16/22 1731)    furosemide (LASIX) 10 mg/mL infusion (non-titrating) 40 mg/hr (09/17/22 0023)    heparin (porcine) in D5W 12 Units/kg/hr (09/16/22 0626)     Scheduled Meds:   amiodarone  400 mg Oral BID    aspirin  325 mg Oral Daily    cetirizine  10 mg Oral Daily    levothyroxine  100 mcg Oral Before breakfast    liothyronine  10 mcg Oral Daily    potassium chloride  40 mEq Oral TID    sacubitriL-valsartan  1 tablet Oral BID    sodium chloride 0.9%  10 mL Intravenous Q6H    spironolactone  25 mg Oral Daily     PRN Meds:benzonatate, sodium chloride 0.9%, Flushing PICC Protocol **AND** sodium chloride 0.9% **AND** sodium chloride 0.9%    Review of patient's allergies indicates:  No Known Allergies  Objective:     Vital Signs (Most Recent):  Temp: 97.6 °F (36.4 °C) (09/17/22 0735)  Pulse: 85 (09/17/22 0735)  Resp: 16 (09/17/22 0735)  BP: 112/69 (09/17/22 0735)  SpO2: 95 % (09/17/22 0735) Vital Signs (24h Range):  Temp:  [97.3 °F (36.3 °C)-98.9 °F (37.2 °C)] 97.6 °F (36.4 °C)  Pulse:  [77-95] 85  Resp:  [16-20] 16  SpO2:  [95 %-100 %] 95 %  BP: (103-119)/(61-74) 112/69     Patient Vitals for the past 72 hrs (Last 3 readings):   Weight   09/17/22 0700 95.4 kg (210 lb 5.1 oz)   09/16/22 0742 103 kg (227 lb)   09/16/22 0400 103.2 kg (227 lb 8.2 oz)     Body mass index is 27.75 kg/m².      Intake/Output Summary (Last 24 hours) at 9/17/2022 0940  Last data filed at 9/17/2022 0700  Gross per 24 hour   Intake 510 ml   Output 6025 ml   Net -5515 ml          Telemetry: Vpcd with PVCs    Physical Exam  Vitals and nursing note reviewed.   Constitutional:       Appearance: He is well-developed.   HENT:      Head: Normocephalic.   Eyes:      Pupils: Pupils are equal, round, and reactive to light.   Neck:      Comments: JVD 3 fingers above clavicle sitting in bed.   Cardiovascular:      Rate  and Rhythm: Normal rate and regular rhythm.      Heart sounds: Murmur heard.   Pulmonary:      Effort: Pulmonary effort is normal.      Breath sounds: Normal breath sounds.   Abdominal:      General: Bowel sounds are normal.      Palpations: Abdomen is soft.   Musculoskeletal:         General: Normal range of motion.      Cervical back: Normal range of motion and neck supple.   Skin:     General: Skin is warm and dry.   Neurological:      Mental Status: He is alert and oriented to person, place, and time.   Psychiatric:         Behavior: Behavior normal.       Significant Labs:  CBC:  Recent Labs   Lab 09/16/22 0433 09/17/22  0521   WBC 7.85 7.25   RBC 4.59* 4.86   HGB 12.0* 12.6*   HCT 37.4* 39.2*   * 181   MCV 82 81*   MCH 26.1* 25.9*   MCHC 32.1 32.1     BNP:  No results for input(s): BNP in the last 168 hours.    Invalid input(s): BNPTRIAGELBLO  CMP:  Recent Labs   Lab 09/16/22 0433 09/16/22  1554 09/16/22  2314 09/17/22  0521   GLU 84 155* 102 103   CALCIUM 9.0 8.9 9.1 9.4   ALBUMIN 4.1 4.0  --  4.3   PROT 6.9 7.0  --  7.6   * 139 140 139   K 3.0* 3.1* 2.9* 3.3*   CO2 22* 29 28 27   CL 98 99 100 99   BUN 28* 27* 26* 22   CREATININE 1.2 1.3 1.1 1.1   ALKPHOS 78 97  --  94   * 325*  --  336*   * 239*  --  226*   BILITOT 1.7* 1.6*  --  2.2*      Coagulation:   Recent Labs   Lab 09/16/22 0433 09/16/22  1728 09/17/22  0521   APTT 41.7* 46.4* 49.3*     LDH:  No results for input(s): LDH in the last 72 hours.  Microbiology:  Microbiology Results (last 7 days)       ** No results found for the last 168 hours. **          I have reviewed all pertinent labs within the past 24 hours.    Estimated Creatinine Clearance: 66.6 mL/min (based on SCr of 1.1 mg/dL).    Diagnostic Results:  I have reviewed all pertinent imaging results/findings within the past 24 hours.

## 2022-09-17 NOTE — PLAN OF CARE
gtt infusing per orders. Lasix gtt infusing; UOP monitored closely. Heparin gtt infusing; PTT AM draw. No signs of bleeding present. During shift patient had runs of vtach; see notes for details. K 2.9 during shift, replacement ordered and administered. Patient remains free from falls and injuries through out shift. Patient AAO and VSS. Patient denies chest pain and SOB. Plan of care reviewed with patient. Patient verbalizes understanding of plan. Will continue to monitor.

## 2022-09-17 NOTE — PROGRESS NOTES
[facility] - Cardiology Stepdown  Heart Transplant  Progress Note    Patient Name: Artemio Ogden  MRN: [id number]  Admission Date: 9/15/2022  Hospital Length of Stay: 2 days  Attending Physician: Tate Roman Jr.,*  Primary Care Provider: Danna Lanza MD  Principal Problem:Acute on chronic combined systolic and diastolic heart failure    Subjective:     Interval History: Feeling better. Diuresing extremely well.     Continuous Infusions:   DOBUTamine IV infusion (non-titrating) 5 mcg/kg/min (09/16/22 1731)    furosemide (LASIX) 10 mg/mL infusion (non-titrating) 40 mg/hr (09/17/22 0023)    heparin (porcine) in D5W 12 Units/kg/hr (09/16/22 0626)     Scheduled Meds:   amiodarone  400 mg Oral BID    aspirin  325 mg Oral Daily    cetirizine  10 mg Oral Daily    levothyroxine  100 mcg Oral Before breakfast    liothyronine  10 mcg Oral Daily    potassium chloride  40 mEq Oral TID    sacubitriL-valsartan  1 tablet Oral BID    sodium chloride 0.9%  10 mL Intravenous Q6H    spironolactone  25 mg Oral Daily     PRN Meds:benzonatate, sodium chloride 0.9%, Flushing PICC Protocol **AND** sodium chloride 0.9% **AND** sodium chloride 0.9%    Review of patient's allergies indicates:  No Known Allergies  Objective:     Vital Signs (Most Recent):  Temp: 97.6 °F (36.4 °C) (09/17/22 0735)  Pulse: 85 (09/17/22 0735)  Resp: 16 (09/17/22 0735)  BP: 112/69 (09/17/22 0735)  SpO2: 95 % (09/17/22 0735) Vital Signs (24h Range):  Temp:  [97.3 °F (36.3 °C)-98.9 °F (37.2 °C)] 97.6 °F (36.4 °C)  Pulse:  [77-95] 85  Resp:  [16-20] 16  SpO2:  [95 %-100 %] 95 %  BP: (103-119)/(61-74) 112/69     Patient Vitals for the past 72 hrs (Last 3 readings):   Weight   09/17/22 0700 95.4 kg (210 lb 5.1 oz)   09/16/22 0742 103 kg (227 lb)   09/16/22 0400 103.2 kg (227 lb 8.2 oz)     Body mass index is 27.75 kg/m².      Intake/Output Summary (Last 24 hours) at 9/17/2022 0940  Last data filed at 9/17/2022 0700  Gross per 24 hour   Intake 510 ml    Output 6025 ml   Net -5515 ml          Telemetry: Vpcd with PVCs    Physical Exam  Vitals and nursing note reviewed.   Constitutional:       Appearance: He is well-developed.   HENT:      Head: Normocephalic.   Eyes:      Pupils: Pupils are equal, round, and reactive to light.   Neck:      Comments: JVD 3 fingers above clavicle sitting in bed.   Cardiovascular:      Rate and Rhythm: Normal rate and regular rhythm.      Heart sounds: Murmur heard.   Pulmonary:      Effort: Pulmonary effort is normal.      Breath sounds: Normal breath sounds.   Abdominal:      General: Bowel sounds are normal.      Palpations: Abdomen is soft.   Musculoskeletal:         General: Normal range of motion.      Cervical back: Normal range of motion and neck supple.   Skin:     General: Skin is warm and dry.   Neurological:      Mental Status: He is alert and oriented to person, place, and time.   Psychiatric:         Behavior: Behavior normal.       Significant Labs:  CBC:  Recent Labs   Lab 09/16/22 0433 09/17/22  0521   WBC 7.85 7.25   RBC 4.59* 4.86   HGB 12.0* 12.6*   HCT 37.4* 39.2*   * 181   MCV 82 81*   MCH 26.1* 25.9*   MCHC 32.1 32.1     BNP:  No results for input(s): BNP in the last 168 hours.    Invalid input(s): BNPTRIAGELBLO  CMP:  Recent Labs   Lab 09/16/22 0433 09/16/22  1554 09/16/22  2314 09/17/22  0521   GLU 84 155* 102 103   CALCIUM 9.0 8.9 9.1 9.4   ALBUMIN 4.1 4.0  --  4.3   PROT 6.9 7.0  --  7.6   * 139 140 139   K 3.0* 3.1* 2.9* 3.3*   CO2 22* 29 28 27   CL 98 99 100 99   BUN 28* 27* 26* 22   CREATININE 1.2 1.3 1.1 1.1   ALKPHOS 78 97  --  94   * 325*  --  336*   * 239*  --  226*   BILITOT 1.7* 1.6*  --  2.2*      Coagulation:   Recent Labs   Lab 09/16/22 0433 09/16/22  1728 09/17/22  0521   APTT 41.7* 46.4* 49.3*     LDH:  No results for input(s): LDH in the last 72 hours.  Microbiology:  Microbiology Results (last 7 days)       ** No results found for the last 168 hours. **           I have reviewed all pertinent labs within the past 24 hours.    Estimated Creatinine Clearance: 66.6 mL/min (based on SCr of 1.1 mg/dL).    Diagnostic Results:  I have reviewed all pertinent imaging results/findings within the past 24 hours.    Assessment and Plan:     Mr. Everette Ogden is a 73 y/o M w/ PMH of NICM, HFrEF (EF 15% as of 7/6/22) s/p ICD and pacemaker, severe MR s/p MV repair (6/30/22), paroxysmal AFIB s/p MAZE and left atrial appendage resection (6/30/22), cardiac tamponade s/p pericardial window (7/17/22), bilateral carotid artery stenosis, hypothyroidism and lumbar spinal stenosis who initially presented to  on 9/11 endorsing worsening SOB and continuous hacking cough with pink sputum. He was admitted with acute decompensated heart failure and started on Lasix drip of 5 and dobutamine 2.5. During his hospitalization there patient underwent several procedures including a CTA for an elevated d-dimer which was negative for PE but showed a small right sided pleural effusion. Patient also had a TTE (9/12/22) and RUCHI (9/13/22) done. TTE showed EF 15-20%, LV that was moderately to severely dilated, LA that was moderately to severely dilated, RV that was moderately dilated, RA that appeared dilated, MV that moderate to severe regurgitation, AV that showed trivial regurgitation, PV with mild to moderate regurgitation, TV w/ moderate regurgitation, Pericardium w/ no effusion, PASP was upper normal, and LVIDd of 7cm. RUCHI revealed severely depressed LV systolic function, EF of 15-20%, moderate MR after failed mitral valve repair, dilated right chambers. Patient was transferred to [facility] for higher level of care for his decompensated heart failure presenting with  2.5 and Lasix 5 drip.     Of note patient is s/p MVR/MAZE/ablation at [facility] on 6/5/22 and subsequently was admitted to [facility] on 7/17/22 w/ a large pericardial effusion w/ cardiac  tamponade and sternal dehiscence. He underwent pericardial window, thoracostomy, and sternal debridement per Dr. Meeks. Left Heart Cath (5/20/22) showed mild to moderate CAD.     At the time of examination patient denied any SOB, chest pain, dizziness, fever, chills, abdominal pain, nausea, vomiting. He states he has been compliant with his medications and diet. This is his first hospitalization for HF since his surgery in June of 2022.     Home medications:   Atenolol 50mg daily (stopped by home cardiologist prior to transferring)  Levothyroxine 100 mcg daily  Liothyronine (T3) 5mcg daily   Aspirin 325mg daily  Lasix 40mg daily  Zyrtec 10 mg daily  Singulair 10mg daily   Amiodarone 400mg bid (started during hospitalization due to NSVT and frequent PVCs)    Of note as per patient, he has taken Coreg (SOB), metoprolol (muscle cramps, depression, visual disturbance), spironolactone (parasthesia, cramps, cough), Bystolic (edema, muscle cramps, wheezing), lisinopril (muscle cramps), and losartan (cough and muscle cramps) in the past and they were discontinued by his Cardiologist after patient reported apparent side effects as seen in parenthesis.  Patient was also on anticoagulation with Eliquis and then Xarelto but was taken off due to cramping and constipation since August 2022.     FMH: Father possible HF 2/2 viral mycarditis. Grandparents (mother) ICM.     Social: Stopped smoking and alcohol 32 yrs ago. Did have 25 yr hx of 1ppd. No drugs. Lives at home with wife of 41 years. 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 Now daughter who lives next door with children takes care of both parents.       * Acute on chronic combined systolic and diastolic heart failure  Mr. Everette Ogden is a 71 y/o M w/ PMH of NICM, HFrEF (EF 15% as of 7/6/22) s/p ICD and pacemaker, severe MR s/p MV repair (6/30/22), paroxysmal AFIB s/p MAZE and left atrial appendage resection (6/30/22), cardiac tamponade  s/p pericardial window (7/17/22), bilateral carotid artery stenosis, hypothyroidism and lumbar spinal stenosis admitted for acute on chronic decompensate HF secondary to unknown cause. Possibly due to failed MV repair as noted on RUCHI at [place]. TTE (9/12/22) and RUCHI (9/13/22) done. TTE showed EF 15-20%, LV that was moderately to severely dilated, LA that was moderately to severely dilated, RV that was moderately dilated, RA that appeared dilated, MV that moderate to severe regurgitation, AV that showed trivial regurgitation, PV with mild to moderate regurgitation, TV w/ moderate regurgitation, Pericardium w/ no effusion, PASP was upper normal, and LVIDd of 7cm. Patient noted to have elevated LA of 2.3 and AST/ALT of 296 and 350 respectively. Creatinine 1.2 near baseline of 1.05. States he has been producing good urine.     - Diuresing well. Will decrease gtt to 20mg/hr and start entresto.   - Cont  5.   -GDMT, Add entresto and spironolactone.   - Echo 9/16: EF 20, Mild-Mod MR, Mod-sev TR, LVIdd 7.47, TAPSE 1.46.   - Maintain K>4 and Mg>2        Paroxysmal atrial fibrillation  - restart amiodarone 400mg bid.   - start heparin drip.     Hypothyroidism  - restart home medications levothyroxine and lithothyronine.   - TSH ordered.       Aftab Sue NP  Heart Transplant  [facility] - Cardiology Stepdown   Finasteride Male Counseling: Finasteride Counseling:  I discussed with the patient the risks of use of finasteride including but not limited to decreased libido, decreased ejaculate volume, gynecomastia, and depression. Women should not handle medication.  All of the patient's questions and concerns were addressed. Finasteride Counseling:  I discussed with the patient the risks of use of finasteride including but not limited to decreased libido, decreased ejaculate volume, gynecomastia, and depression. Women should not handle medication.  All of the patient's questions and concerns were addressed.

## 2022-09-18 LAB
ALBUMIN SERPL BCP-MCNC: 3.8 G/DL (ref 3.5–5.2)
ALBUMIN SERPL BCP-MCNC: 3.9 G/DL (ref 3.5–5.2)
ALP SERPL-CCNC: 100 U/L (ref 55–135)
ALP SERPL-CCNC: 98 U/L (ref 55–135)
ALT SERPL W/O P-5'-P-CCNC: 240 U/L (ref 10–44)
ALT SERPL W/O P-5'-P-CCNC: 271 U/L (ref 10–44)
ANION GAP SERPL CALC-SCNC: 10 MMOL/L (ref 8–16)
ANION GAP SERPL CALC-SCNC: 16 MMOL/L (ref 8–16)
APTT BLDCRRT: 46.1 SEC (ref 21–32)
AST SERPL-CCNC: 116 U/L (ref 10–40)
AST SERPL-CCNC: 139 U/L (ref 10–40)
BASOPHILS # BLD AUTO: 0.03 K/UL (ref 0–0.2)
BASOPHILS NFR BLD: 0.5 % (ref 0–1.9)
BILIRUB SERPL-MCNC: 2 MG/DL (ref 0.1–1)
BILIRUB SERPL-MCNC: 2.3 MG/DL (ref 0.1–1)
BUN SERPL-MCNC: 23 MG/DL (ref 8–23)
BUN SERPL-MCNC: 24 MG/DL (ref 8–23)
CALCIUM SERPL-MCNC: 9.4 MG/DL (ref 8.7–10.5)
CALCIUM SERPL-MCNC: 9.4 MG/DL (ref 8.7–10.5)
CHLORIDE SERPL-SCNC: 101 MMOL/L (ref 95–110)
CHLORIDE SERPL-SCNC: 96 MMOL/L (ref 95–110)
CO2 SERPL-SCNC: 22 MMOL/L (ref 23–29)
CO2 SERPL-SCNC: 29 MMOL/L (ref 23–29)
CREAT SERPL-MCNC: 1.1 MG/DL (ref 0.5–1.4)
CREAT SERPL-MCNC: 1.3 MG/DL (ref 0.5–1.4)
DIFFERENTIAL METHOD: ABNORMAL
EOSINOPHIL # BLD AUTO: 0.1 K/UL (ref 0–0.5)
EOSINOPHIL NFR BLD: 1.9 % (ref 0–8)
ERYTHROCYTE [DISTWIDTH] IN BLOOD BY AUTOMATED COUNT: 18.1 % (ref 11.5–14.5)
EST. GFR  (NO RACE VARIABLE): 57.6 ML/MIN/1.73 M^2
EST. GFR  (NO RACE VARIABLE): >60 ML/MIN/1.73 M^2
GLUCOSE SERPL-MCNC: 266 MG/DL (ref 70–110)
GLUCOSE SERPL-MCNC: 89 MG/DL (ref 70–110)
HCT VFR BLD AUTO: 41.6 % (ref 40–54)
HGB BLD-MCNC: 12.9 G/DL (ref 14–18)
IMM GRANULOCYTES # BLD AUTO: 0.01 K/UL (ref 0–0.04)
IMM GRANULOCYTES NFR BLD AUTO: 0.2 % (ref 0–0.5)
LYMPHOCYTES # BLD AUTO: 1 K/UL (ref 1–4.8)
LYMPHOCYTES NFR BLD: 15.6 % (ref 18–48)
MAGNESIUM SERPL-MCNC: 2 MG/DL (ref 1.6–2.6)
MAGNESIUM SERPL-MCNC: 2.1 MG/DL (ref 1.6–2.6)
MCH RBC QN AUTO: 25.6 PG (ref 27–31)
MCHC RBC AUTO-ENTMCNC: 31 G/DL (ref 32–36)
MCV RBC AUTO: 83 FL (ref 82–98)
MONOCYTES # BLD AUTO: 0.7 K/UL (ref 0.3–1)
MONOCYTES NFR BLD: 11.5 % (ref 4–15)
NEUTROPHILS # BLD AUTO: 4.3 K/UL (ref 1.8–7.7)
NEUTROPHILS NFR BLD: 70.3 % (ref 38–73)
NRBC BLD-RTO: 0 /100 WBC
PLATELET # BLD AUTO: 169 K/UL (ref 150–450)
PMV BLD AUTO: 10.2 FL (ref 9.2–12.9)
POTASSIUM SERPL-SCNC: 3.4 MMOL/L (ref 3.5–5.1)
POTASSIUM SERPL-SCNC: 4.3 MMOL/L (ref 3.5–5.1)
PROT SERPL-MCNC: 7.1 G/DL (ref 6–8.4)
PROT SERPL-MCNC: 7.3 G/DL (ref 6–8.4)
RBC # BLD AUTO: 5.04 M/UL (ref 4.6–6.2)
SODIUM SERPL-SCNC: 134 MMOL/L (ref 136–145)
SODIUM SERPL-SCNC: 140 MMOL/L (ref 136–145)
WBC # BLD AUTO: 6.16 K/UL (ref 3.9–12.7)

## 2022-09-18 PROCEDURE — 85730 THROMBOPLASTIN TIME PARTIAL: CPT | Performed by: STUDENT IN AN ORGANIZED HEALTH CARE EDUCATION/TRAINING PROGRAM

## 2022-09-18 PROCEDURE — 25000003 PHARM REV CODE 250: Performed by: STUDENT IN AN ORGANIZED HEALTH CARE EDUCATION/TRAINING PROGRAM

## 2022-09-18 PROCEDURE — 25000003 PHARM REV CODE 250: Performed by: NURSE PRACTITIONER

## 2022-09-18 PROCEDURE — 83735 ASSAY OF MAGNESIUM: CPT | Mod: 91 | Performed by: STUDENT IN AN ORGANIZED HEALTH CARE EDUCATION/TRAINING PROGRAM

## 2022-09-18 PROCEDURE — 20600001 HC STEP DOWN PRIVATE ROOM

## 2022-09-18 PROCEDURE — 63600175 PHARM REV CODE 636 W HCPCS: Performed by: STUDENT IN AN ORGANIZED HEALTH CARE EDUCATION/TRAINING PROGRAM

## 2022-09-18 PROCEDURE — 85025 COMPLETE CBC W/AUTO DIFF WBC: CPT | Performed by: STUDENT IN AN ORGANIZED HEALTH CARE EDUCATION/TRAINING PROGRAM

## 2022-09-18 PROCEDURE — 99233 PR SUBSEQUENT HOSPITAL CARE,LEVL III: ICD-10-PCS | Mod: ,,, | Performed by: NURSE PRACTITIONER

## 2022-09-18 PROCEDURE — 80053 COMPREHEN METABOLIC PANEL: CPT | Performed by: STUDENT IN AN ORGANIZED HEALTH CARE EDUCATION/TRAINING PROGRAM

## 2022-09-18 PROCEDURE — 94761 N-INVAS EAR/PLS OXIMETRY MLT: CPT

## 2022-09-18 PROCEDURE — 99233 SBSQ HOSP IP/OBS HIGH 50: CPT | Mod: ,,, | Performed by: NURSE PRACTITIONER

## 2022-09-18 PROCEDURE — 25000003 PHARM REV CODE 250: Performed by: INTERNAL MEDICINE

## 2022-09-18 PROCEDURE — A4216 STERILE WATER/SALINE, 10 ML: HCPCS | Performed by: INTERNAL MEDICINE

## 2022-09-18 RX ORDER — GUAIFENESIN/DEXTROMETHORPHAN 100-10MG/5
5 SYRUP ORAL EVERY 4 HOURS PRN
Status: DISCONTINUED | OUTPATIENT
Start: 2022-09-18 | End: 2022-09-23 | Stop reason: HOSPADM

## 2022-09-18 RX ORDER — PROMETHAZINE HYDROCHLORIDE AND CODEINE PHOSPHATE 6.25; 1 MG/5ML; MG/5ML
5 SOLUTION ORAL EVERY 4 HOURS PRN
Status: DISCONTINUED | OUTPATIENT
Start: 2022-09-18 | End: 2022-09-23 | Stop reason: HOSPADM

## 2022-09-18 RX ORDER — POTASSIUM CHLORIDE 20 MEQ/1
40 TABLET, EXTENDED RELEASE ORAL ONCE
Status: COMPLETED | OUTPATIENT
Start: 2022-09-18 | End: 2022-09-18

## 2022-09-18 RX ORDER — ACETAMINOPHEN 325 MG/1
650 TABLET ORAL EVERY 6 HOURS PRN
Status: DISCONTINUED | OUTPATIENT
Start: 2022-09-18 | End: 2022-09-23 | Stop reason: HOSPADM

## 2022-09-18 RX ADMIN — ACETAMINOPHEN 650 MG: 325 TABLET ORAL at 09:09

## 2022-09-18 RX ADMIN — Medication 10 ML: at 05:09

## 2022-09-18 RX ADMIN — SACUBITRIL AND VALSARTAN 1 TABLET: 24; 26 TABLET, FILM COATED ORAL at 08:09

## 2022-09-18 RX ADMIN — AMIODARONE HYDROCHLORIDE 400 MG: 200 TABLET ORAL at 08:09

## 2022-09-18 RX ADMIN — POTASSIUM CHLORIDE 40 MEQ: 1500 TABLET, EXTENDED RELEASE ORAL at 08:09

## 2022-09-18 RX ADMIN — CETIRIZINE HYDROCHLORIDE 10 MG: 10 TABLET, FILM COATED ORAL at 08:09

## 2022-09-18 RX ADMIN — LIOTHYRONINE SODIUM 10 MCG: 5 TABLET ORAL at 08:09

## 2022-09-18 RX ADMIN — ACETAMINOPHEN 650 MG: 325 TABLET ORAL at 03:09

## 2022-09-18 RX ADMIN — Medication 10 ML: at 11:09

## 2022-09-18 RX ADMIN — GUAIFENESIN AND DEXTROMETHORPHAN 5 ML: 100; 10 SYRUP ORAL at 09:09

## 2022-09-18 RX ADMIN — DOBUTAMINE HYDROCHLORIDE 5 MCG/KG/MIN: 400 INJECTION INTRAVENOUS at 08:09

## 2022-09-18 RX ADMIN — DOBUTAMINE HYDROCHLORIDE 5 MCG/KG/MIN: 400 INJECTION INTRAVENOUS at 05:09

## 2022-09-18 RX ADMIN — LEVOTHYROXINE SODIUM 100 MCG: 100 TABLET ORAL at 05:09

## 2022-09-18 RX ADMIN — ASPIRIN 325 MG ORAL TABLET 325 MG: 325 PILL ORAL at 08:09

## 2022-09-18 RX ADMIN — GUAIFENESIN AND DEXTROMETHORPHAN 5 ML: 100; 10 SYRUP ORAL at 08:09

## 2022-09-18 RX ADMIN — Medication 10 ML: at 12:09

## 2022-09-18 RX ADMIN — Medication 10 ML: at 06:09

## 2022-09-18 RX ADMIN — SPIRONOLACTONE 25 MG: 25 TABLET, FILM COATED ORAL at 08:09

## 2022-09-18 RX ADMIN — PROMETHAZINE HYDROCHLORIDE AND CODEINE PHOSPHATE 5 ML: 10; 6.25 SOLUTION ORAL at 09:09

## 2022-09-18 RX ADMIN — HEPARIN SODIUM 12 UNITS/KG/HR: 5000 INJECTION INTRAVENOUS; SUBCUTANEOUS at 12:09

## 2022-09-18 RX ADMIN — POTASSIUM CHLORIDE 40 MEQ: 1500 TABLET, EXTENDED RELEASE ORAL at 03:09

## 2022-09-18 NOTE — NURSING
Plan of care discussed with patient.  AAOx4. VSS. Patient remains free of falls and trauma. Patient ambulating with standby assist, fall precautions in place. Patient has no complaints of pain. Lasix gtt @ 20mg/hr.  gtt @ 5mcg/kg/min. Heparin gtt @ 12units/kg/min. Discussed medications and care. Patient has no questions at this time. Will continue to monitor.

## 2022-09-18 NOTE — NURSING
Patient awake at this time. NAD noted. Medications administered per MAR. Dobutamine, lasix, and heparin gtt infusing at this time. Patient remains free from falls, injury, and harm. PICC and telemetry intact. Labs drawn. Awaiting results. Bed locked in lowest position. Call bell and personal items within reach. Will continue POC

## 2022-09-19 LAB
ALBUMIN SERPL BCP-MCNC: 4 G/DL (ref 3.5–5.2)
ALBUMIN SERPL BCP-MCNC: 4 G/DL (ref 3.5–5.2)
ALP SERPL-CCNC: 101 U/L (ref 55–135)
ALP SERPL-CCNC: 110 U/L (ref 55–135)
ALT SERPL W/O P-5'-P-CCNC: 197 U/L (ref 10–44)
ALT SERPL W/O P-5'-P-CCNC: 222 U/L (ref 10–44)
ANION GAP SERPL CALC-SCNC: 10 MMOL/L (ref 8–16)
ANION GAP SERPL CALC-SCNC: 6 MMOL/L (ref 8–16)
APTT BLDCRRT: 45.3 SEC (ref 21–32)
ASCENDING AORTA: 3.29 CM
AST SERPL-CCNC: 73 U/L (ref 10–40)
AST SERPL-CCNC: 91 U/L (ref 10–40)
AV INDEX (PROSTH): 0.48
AV MEAN GRADIENT: 10 MMHG
AV PEAK GRADIENT: 18 MMHG
AV VALVE AREA: 1.93 CM2
AV VELOCITY RATIO: 0.48
BASOPHILS # BLD AUTO: 0.05 K/UL (ref 0–0.2)
BASOPHILS NFR BLD: 0.7 % (ref 0–1.9)
BILIRUB SERPL-MCNC: 1.8 MG/DL (ref 0.1–1)
BILIRUB SERPL-MCNC: 2.3 MG/DL (ref 0.1–1)
BSA FOR ECHO PROCEDURE: 2.15 M2
BUN SERPL-MCNC: 27 MG/DL (ref 8–23)
BUN SERPL-MCNC: 32 MG/DL (ref 8–23)
CALCIUM SERPL-MCNC: 10.2 MG/DL (ref 8.7–10.5)
CALCIUM SERPL-MCNC: 9.9 MG/DL (ref 8.7–10.5)
CHLORIDE SERPL-SCNC: 98 MMOL/L (ref 95–110)
CHLORIDE SERPL-SCNC: 98 MMOL/L (ref 95–110)
CO2 SERPL-SCNC: 28 MMOL/L (ref 23–29)
CO2 SERPL-SCNC: 29 MMOL/L (ref 23–29)
CREAT SERPL-MCNC: 1.3 MG/DL (ref 0.5–1.4)
CREAT SERPL-MCNC: 1.8 MG/DL (ref 0.5–1.4)
CV ECHO LV RWT: 0.2 CM
DIFFERENTIAL METHOD: ABNORMAL
DOP CALC AO PEAK VEL: 2.15 M/S
DOP CALC AO VTI: 33.8 CM
DOP CALC LVOT AREA: 4 CM2
DOP CALC LVOT DIAMETER: 2.27 CM
DOP CALC LVOT PEAK VEL: 1.04 M/S
DOP CALC LVOT STROKE VOLUME: 65.12 CM3
DOP CALC MV VTI: 8.18 CM
DOP CALCLVOT PEAK VEL VTI: 16.1 CM
E WAVE DECELERATION TIME: 297.32 MSEC
E/A RATIO: 1.31
E/E' RATIO: 23.5 M/S
ECHO LV POSTERIOR WALL: 0.81 CM (ref 0.6–1.1)
EJECTION FRACTION: 15 %
EOSINOPHIL # BLD AUTO: 0.2 K/UL (ref 0–0.5)
EOSINOPHIL NFR BLD: 2.1 % (ref 0–8)
ERYTHROCYTE [DISTWIDTH] IN BLOOD BY AUTOMATED COUNT: 18 % (ref 11.5–14.5)
EST. GFR  (NO RACE VARIABLE): 39 ML/MIN/1.73 M^2
EST. GFR  (NO RACE VARIABLE): 57.6 ML/MIN/1.73 M^2
FRACTIONAL SHORTENING: 11 % (ref 28–44)
GLUCOSE SERPL-MCNC: 100 MG/DL (ref 70–110)
GLUCOSE SERPL-MCNC: 153 MG/DL (ref 70–110)
HCT VFR BLD AUTO: 43.3 % (ref 40–54)
HGB BLD-MCNC: 13.7 G/DL (ref 14–18)
HR MV ECHO: 110 BPM
IMM GRANULOCYTES # BLD AUTO: 0.02 K/UL (ref 0–0.04)
IMM GRANULOCYTES NFR BLD AUTO: 0.3 % (ref 0–0.5)
INTERVENTRICULAR SEPTUM: 0.5 CM (ref 0.6–1.1)
IVRT: 59.94 MSEC
LA MAJOR: 7.38 CM
LA MINOR: 7.32 CM
LA WIDTH: 5.46 CM
LEFT ATRIUM SIZE: 4.72 CM
LEFT ATRIUM VOLUME INDEX MOD: 55.6 ML/M2
LEFT ATRIUM VOLUME INDEX: 75.2 ML/M2
LEFT ATRIUM VOLUME MOD: 118.91 CM3
LEFT ATRIUM VOLUME: 161 CM3
LEFT INTERNAL DIMENSION IN SYSTOLE: 7.3 CM (ref 2.1–4)
LEFT VENTRICLE DIASTOLIC VOLUME INDEX: 118.02 ML/M2
LEFT VENTRICLE DIASTOLIC VOLUME: 252.57 ML
LEFT VENTRICLE MASS INDEX: 120 G/M2
LEFT VENTRICLE SYSTOLIC VOLUME INDEX: 92.4 ML/M2
LEFT VENTRICLE SYSTOLIC VOLUME: 197.81 ML
LEFT VENTRICULAR INTERNAL DIMENSION IN DIASTOLE: 8.2 CM (ref 3.5–6)
LEFT VENTRICULAR MASS: 257.45 G
LV LATERAL E/E' RATIO: 20.14 M/S
LV SEPTAL E/E' RATIO: 28.2 M/S
LYMPHOCYTES # BLD AUTO: 1.2 K/UL (ref 1–4.8)
LYMPHOCYTES NFR BLD: 16.4 % (ref 18–48)
MAGNESIUM SERPL-MCNC: 2.2 MG/DL (ref 1.6–2.6)
MAGNESIUM SERPL-MCNC: 2.2 MG/DL (ref 1.6–2.6)
MCH RBC QN AUTO: 25.4 PG (ref 27–31)
MCHC RBC AUTO-ENTMCNC: 31.6 G/DL (ref 32–36)
MCV RBC AUTO: 80 FL (ref 82–98)
MONOCYTES # BLD AUTO: 0.7 K/UL (ref 0.3–1)
MONOCYTES NFR BLD: 10 % (ref 4–15)
MV A" WAVE DURATION": 5.71 MSEC
MV MEAN GRADIENT: 4 MMHG
MV PEAK A VEL: 1.08 M/S
MV PEAK E VEL: 1.41 M/S
MV PEAK GRADIENT: 8 MMHG
MV STENOSIS PRESSURE HALF TIME: 53.02 MS
MV VALVE AREA BY CONTINUITY EQUATION: 7.96 CM2
MV VALVE AREA P 1/2 METHOD: 4.15 CM2
NEUTROPHILS # BLD AUTO: 5 K/UL (ref 1.8–7.7)
NEUTROPHILS NFR BLD: 70.5 % (ref 38–73)
NRBC BLD-RTO: 0 /100 WBC
PISA MRMAX VEL: 0.04 M/S
PISA TR MAX VEL: 2.6 M/S
PLATELET # BLD AUTO: 183 K/UL (ref 150–450)
PMV BLD AUTO: 9.1 FL (ref 9.2–12.9)
POTASSIUM SERPL-SCNC: 4 MMOL/L (ref 3.5–5.1)
POTASSIUM SERPL-SCNC: 4.6 MMOL/L (ref 3.5–5.1)
PROT SERPL-MCNC: 7.6 G/DL (ref 6–8.4)
PROT SERPL-MCNC: 7.8 G/DL (ref 6–8.4)
PULM VEIN S/D RATIO: 0.56
PV PEAK D VEL: 0.54 M/S
PV PEAK S VEL: 0.3 M/S
RA MAJOR: 6.94 CM
RA PRESSURE: 8 MMHG
RA WIDTH: 5.32 CM
RBC # BLD AUTO: 5.39 M/UL (ref 4.6–6.2)
RIGHT VENTRICULAR END-DIASTOLIC DIMENSION: 5 CM
RV TISSUE DOPPLER FREE WALL SYSTOLIC VELOCITY 1 (APICAL 4 CHAMBER VIEW): 12.3 CM/S
SINUS: 3.19 CM
SODIUM SERPL-SCNC: 133 MMOL/L (ref 136–145)
SODIUM SERPL-SCNC: 136 MMOL/L (ref 136–145)
STJ: 2.9 CM
TDI LATERAL: 0.07 M/S
TDI SEPTAL: 0.05 M/S
TDI: 0.06 M/S
TR MAX PG: 27 MMHG
TRICUSPID ANNULAR PLANE SYSTOLIC EXCURSION: 0.87 CM
TV REST PULMONARY ARTERY PRESSURE: 35 MMHG
WBC # BLD AUTO: 7.07 K/UL (ref 3.9–12.7)

## 2022-09-19 PROCEDURE — 80053 COMPREHEN METABOLIC PANEL: CPT | Mod: 91 | Performed by: STUDENT IN AN ORGANIZED HEALTH CARE EDUCATION/TRAINING PROGRAM

## 2022-09-19 PROCEDURE — 85025 COMPLETE CBC W/AUTO DIFF WBC: CPT | Performed by: STUDENT IN AN ORGANIZED HEALTH CARE EDUCATION/TRAINING PROGRAM

## 2022-09-19 PROCEDURE — 25000003 PHARM REV CODE 250: Performed by: INTERNAL MEDICINE

## 2022-09-19 PROCEDURE — 99233 SBSQ HOSP IP/OBS HIGH 50: CPT | Mod: ,,, | Performed by: NURSE PRACTITIONER

## 2022-09-19 PROCEDURE — 20600001 HC STEP DOWN PRIVATE ROOM

## 2022-09-19 PROCEDURE — 25000003 PHARM REV CODE 250: Performed by: STUDENT IN AN ORGANIZED HEALTH CARE EDUCATION/TRAINING PROGRAM

## 2022-09-19 PROCEDURE — 83735 ASSAY OF MAGNESIUM: CPT | Mod: 91 | Performed by: STUDENT IN AN ORGANIZED HEALTH CARE EDUCATION/TRAINING PROGRAM

## 2022-09-19 PROCEDURE — 63600175 PHARM REV CODE 636 W HCPCS: Performed by: NURSE PRACTITIONER

## 2022-09-19 PROCEDURE — 85730 THROMBOPLASTIN TIME PARTIAL: CPT | Performed by: STUDENT IN AN ORGANIZED HEALTH CARE EDUCATION/TRAINING PROGRAM

## 2022-09-19 PROCEDURE — 63600175 PHARM REV CODE 636 W HCPCS: Performed by: STUDENT IN AN ORGANIZED HEALTH CARE EDUCATION/TRAINING PROGRAM

## 2022-09-19 PROCEDURE — A4216 STERILE WATER/SALINE, 10 ML: HCPCS | Performed by: INTERNAL MEDICINE

## 2022-09-19 PROCEDURE — 25000003 PHARM REV CODE 250: Performed by: NURSE PRACTITIONER

## 2022-09-19 PROCEDURE — 99233 PR SUBSEQUENT HOSPITAL CARE,LEVL III: ICD-10-PCS | Mod: ,,, | Performed by: NURSE PRACTITIONER

## 2022-09-19 RX ORDER — POTASSIUM CHLORIDE 20 MEQ/1
40 TABLET, EXTENDED RELEASE ORAL 2 TIMES DAILY
Status: DISCONTINUED | OUTPATIENT
Start: 2022-09-19 | End: 2022-09-21

## 2022-09-19 RX ADMIN — ACETAMINOPHEN 650 MG: 325 TABLET ORAL at 06:09

## 2022-09-19 RX ADMIN — ASPIRIN 325 MG ORAL TABLET 325 MG: 325 PILL ORAL at 09:09

## 2022-09-19 RX ADMIN — PROMETHAZINE HYDROCHLORIDE AND CODEINE PHOSPHATE 5 ML: 10; 6.25 SOLUTION ORAL at 09:09

## 2022-09-19 RX ADMIN — FUROSEMIDE 20 MG/HR: 10 INJECTION, SOLUTION INTRAMUSCULAR; INTRAVENOUS at 05:09

## 2022-09-19 RX ADMIN — POTASSIUM CHLORIDE 40 MEQ: 1500 TABLET, EXTENDED RELEASE ORAL at 08:09

## 2022-09-19 RX ADMIN — AMIODARONE HYDROCHLORIDE 400 MG: 200 TABLET ORAL at 09:09

## 2022-09-19 RX ADMIN — LEVOTHYROXINE SODIUM 100 MCG: 100 TABLET ORAL at 05:09

## 2022-09-19 RX ADMIN — GUAIFENESIN AND DEXTROMETHORPHAN 5 ML: 100; 10 SYRUP ORAL at 08:09

## 2022-09-19 RX ADMIN — POTASSIUM CHLORIDE 40 MEQ: 1500 TABLET, EXTENDED RELEASE ORAL at 09:09

## 2022-09-19 RX ADMIN — HEPARIN SODIUM 12 UNITS/KG/HR: 5000 INJECTION INTRAVENOUS; SUBCUTANEOUS at 03:09

## 2022-09-19 RX ADMIN — Medication 10 ML: at 05:09

## 2022-09-19 RX ADMIN — AMIODARONE HYDROCHLORIDE 400 MG: 200 TABLET ORAL at 08:09

## 2022-09-19 RX ADMIN — SACUBITRIL AND VALSARTAN 1 TABLET: 24; 26 TABLET, FILM COATED ORAL at 09:09

## 2022-09-19 RX ADMIN — Medication 10 ML: at 06:09

## 2022-09-19 RX ADMIN — DOBUTAMINE HYDROCHLORIDE 2.5 MCG/KG/MIN: 400 INJECTION INTRAVENOUS at 08:09

## 2022-09-19 RX ADMIN — LIOTHYRONINE SODIUM 10 MCG: 5 TABLET ORAL at 09:09

## 2022-09-19 RX ADMIN — SPIRONOLACTONE 25 MG: 25 TABLET, FILM COATED ORAL at 09:09

## 2022-09-19 RX ADMIN — CETIRIZINE HYDROCHLORIDE 10 MG: 10 TABLET, FILM COATED ORAL at 09:09

## 2022-09-19 NOTE — ASSESSMENT & PLAN NOTE
Mr. Everette Ogden is a 71 y/o M w/ PMH of NICM, HFrEF (EF 15% as of 7/6/22) s/p ICD and pacemaker, severe MR s/p MV repair (6/30/22), paroxysmal AFIB s/p MAZE and left atrial appendage resection (6/30/22), cardiac tamponade s/p pericardial window (7/17/22), bilateral carotid artery stenosis, hypothyroidism and lumbar spinal stenosis admitted for acute on chronic decompensate HF secondary to unknown cause. Possibly due to failed MV repair as noted on RUCHI at [place]. TTE (9/12/22) and RUCHI (9/13/22) done. TTE showed EF 15-20%, LV that was moderately to severely dilated, LA that was moderately to severely dilated, RV that was moderately dilated, RA that appeared dilated, MV that moderate to severe regurgitation, AV that showed trivial regurgitation, PV with mild to moderate regurgitation, TV w/ moderate regurgitation, Pericardium w/ no effusion, PASP was upper normal, and LVIDd of 7cm. Patient noted to have elevated LA of 2.3 and AST/ALT of 296 and 350 respectively. Creatinine 1.2 near baseline of 1.05. States he has been producing good urine.     - Diuresing well. Continue Lasix 20mg/hr.  - Cont  5. Will wean to 2.5 today and hopefully wean off tomorrow.   - GDMT: Cont entresto 24-26(tolerating well) and spironolactone 25mg daily.   - Echo 9/16: EF 20, Mild-Mod MR, Mod-sev TR, LVIdd 7.47, TAPSE 1.46. Will repeat once euvolemic.   - Maintain K>4 and Mg>2  - PT consult.

## 2022-09-19 NOTE — ASSESSMENT & PLAN NOTE
Mr. Everette Ogden is a 73 y/o M w/ PMH of NICM, HFrEF (EF 15% as of 7/6/22) s/p ICD and pacemaker, severe MR s/p MV repair (6/30/22), paroxysmal AFIB s/p MAZE and left atrial appendage resection (6/30/22), cardiac tamponade s/p pericardial window (7/17/22), bilateral carotid artery stenosis, hypothyroidism and lumbar spinal stenosis admitted for acute on chronic decompensate HF secondary to unknown cause. Possibly due to failed MV repair as noted on RUCHI at [place]. TTE (9/12/22) and RUCHI (9/13/22) done. TTE showed EF 15-20%, LV that was moderately to severely dilated, LA that was moderately to severely dilated, RV that was moderately dilated, RA that appeared dilated, MV that moderate to severe regurgitation, AV that showed trivial regurgitation, PV with mild to moderate regurgitation, TV w/ moderate regurgitation, Pericardium w/ no effusion, PASP was upper normal, and LVIDd of 7cm. Patient noted to have elevated LA of 2.3 and AST/ALT of 296 and 350 respectively. Creatinine 1.2 near baseline of 1.05. States he has been producing good urine.     - Diuresing well. Continue Lasix 20mg/hr.  - Cont  5.   -GDMT: Cont entresto 24-26 and spironolactone 25mg daily..   - Echo 9/16: EF 20, Mild-Mod MR, Mod-sev TR, LVIdd 7.47, TAPSE 1.46. Will repeat once euvolemic.   - Maintain K>4 and Mg>2

## 2022-09-19 NOTE — PROGRESS NOTES
[facility] - [facility]iology Step[facility]  Heart Transplant  Progress Note    Patient Name: Artemio Ogden  MRN: [id number]  Admission Date: 9/15/2022  Hospital Length of Stay: 4 days  Attending Physician: Tate Roman Jr.,*  Primary Care Provider: Danna Lanza MD  Principal Problem:Acute on chronic combined systolic and diastolic heart failure    Subjective:     Interval History: Feeling better. Diuresing extremely well. Has a dry cough overnight but it has slowed this am.    Continuous Infusions:   DOBUTamine IV infusion (non-titrating) 5 mcg/kg/min (09/18/22 2051)    furosemide (LASIX) 10 mg/mL infusion (non-titrating) 20 mg/hr (09/19/22 0525)    heparin (porcine) in D5W 12 Units/kg/hr (09/18/22 1240)     Scheduled Meds:   amiodarone  400 mg Oral BID    aspirin  325 mg Oral Daily    cetirizine  10 mg Oral Daily    levothyroxine  100 mcg Oral Before breakfast    liothyronine  10 mcg Oral Daily    potassium chloride  40 mEq Oral TID    sacubitriL-valsartan  1 tablet Oral BID    sodium chloride 0.9%  10 mL Intravenous Q6H    spironolactone  25 mg Oral Daily     PRN Meds:acetaminophen, benzonatate, dextromethorphan-guaiFENesin  mg/5 ml, promethazine-codeine 6.25-10 mg/5 ml, sodium chloride 0.9%, Flushing PICC Protocol **AND** sodium chloride 0.9% **AND** sodium chloride 0.9%    Review of patient's allergies indicates:  No Known Allergies  Objective:     Vital Signs (Most Recent):  Temp: 97.6 °F (36.4 °C) (09/19/22 0441)  Pulse: 91 (09/19/22 0441)  Resp: 20 (09/19/22 0441)  BP: 115/80 (09/19/22 0441)  SpO2: 95 % (09/19/22 0441) Vital Signs (24h Range):  Temp:  [97.5 °F (36.4 °C)-97.9 °F (36.6 °C)] 97.6 °F (36.4 °C)  Pulse:  [] 91  Resp:  [16-20] 20  SpO2:  [94 %-95 %] 95 %  BP: ()/(54-80) 115/80     Patient Vitals for the past 72 hrs (Last 3 readings):   Weight   09/19/22 0437 89.5 kg (197 lb 5 oz)   09/17/22 0700 95.4 kg (210 lb 5.1 oz)   09/16/22 0742 103 kg (227 lb)       Body mass  index is 26.03 kg/m².      Intake/Output Summary (Last 24 hours) at 9/19/2022 0719  Last data filed at 9/19/2022 0505  Gross per 24 hour   Intake 780 ml   Output 3550 ml   Net -2770 ml            Telemetry: Vpcd with PVCs    Physical Exam  Vitals and nursing note reviewed.   Constitutional:       Appearance: He is well-developed.   HENT:      Head: Normocephalic.   Eyes:      Pupils: Pupils are equal, round, and reactive to light.   Neck:      Comments: JVD 3 fingers above clavicle sitting in bed.   Cardiovascular:      Rate and Rhythm: Normal rate and regular rhythm.      Heart sounds: Murmur heard.   Pulmonary:      Effort: Pulmonary effort is normal.      Breath sounds: Normal breath sounds.   Abdominal:      General: Bowel sounds are normal.      Palpations: Abdomen is soft.   Musculoskeletal:         General: Normal range of motion.      Cervical back: Normal range of motion and neck supple.   Skin:     General: Skin is warm and dry.   Neurological:      Mental Status: He is alert and oriented to person, place, and time.   Psychiatric:         Behavior: Behavior normal.       Significant Labs:  CBC:  Recent Labs   Lab 09/17/22  0521 09/18/22  0454 09/19/22  0502   WBC 7.25 6.16 7.07   RBC 4.86 5.04 5.39   HGB 12.6* 12.9* 13.7*   HCT 39.2* 41.6 43.3    169 183   MCV 81* 83 80*   MCH 25.9* 25.6* 25.4*   MCHC 32.1 31.0* 31.6*       BNP:  No results for input(s): BNP in the last 168 hours.    Invalid input(s): BNPTRIAGELBLO  CMP:  Recent Labs   Lab 09/18/22  0454 09/18/22  1709 09/19/22  0502   GLU 89 266* 100   CALCIUM 9.4 9.4 9.9   ALBUMIN 3.9 3.8 4.0   PROT 7.1 7.3 7.6    134* 136   K 3.4* 4.3 4.0   CO2 29 22* 28    96 98   BUN 23 24* 27*   CREATININE 1.1 1.3 1.3   ALKPHOS 98 100 101   * 240* 222*   * 116* 91*   BILITOT 2.3* 2.0* 2.3*        Coagulation:   Recent Labs   Lab 09/17/22  0521 09/18/22  0454 09/19/22  0502   APTT 49.3* 46.1* 45.3*       LDH:  No results for input(s):  LDH in the last 72 hours.  Microbiology:  Microbiology Results (last 7 days)       ** No results found for the last 168 hours. **          I have reviewed all pertinent labs within the past 24 hours.    Estimated Creatinine Clearance: 56.3 mL/min (based on SCr of 1.3 mg/dL).    Diagnostic Results:  I have reviewed all pertinent imaging results/findings within the past 24 hours.    Assessment and Plan:     Mr. Everette Ogden is a 73 y/o M w/ PMH of NICM, HFrEF (EF 15% as of 7/6/22) s/p ICD and pacemaker, severe MR s/p MV repair (6/30/22), paroxysmal AFIB s/p MAZE and left atrial appendage resection (6/30/22), cardiac tamponade s/p pericardial window (7/17/22), bilateral carotid artery stenosis, hypothyroidism and lumbar spinal stenosis who initially presented to [facility] on 9/11 endorsing worsening SOB and continuous hacking cough with pink sputum. He was admitted with acute decompensated heart failure and started on Lasix drip of 5 and dobutamine 2.5. During his hospitalization there patient underwent several procedures including a CTA for an elevated d-dimer which was negative for PE but showed a small right sided pleural effusion. Patient also had a TTE (9/12/22) and RUCHI (9/13/22) done. TTE showed EF 15-20%, LV that was moderately to severely dilated, LA that was moderately to severely dilated, RV that was moderately dilated, RA that appeared dilated, MV that moderate to severe regurgitation, AV that showed trivial regurgitation, PV with mild to moderate regurgitation, TV w/ moderate regurgitation, Pericardium w/ no effusion, PASP was upper normal, and LVIDd of 7cm. RUCHI revealed severely depressed LV systolic function, EF of 15-20%, moderate MR after failed mitral valve repair, dilated right chambers. Patient was transferred to [facility] for higher level of care for his decompensated heart failure presenting with  2.5 and Lasix 5 drip.     Of note patient is s/p MVR/MAZE/ablation at [facility]  on 6/5/22 and subsequently was admitted to [facility] on 7/17/22 w/ a large pericardial effusion w/ cardiac tamponade and sternal dehiscence. He underwent pericardial window, thoracostomy, and sternal debridement per Dr. Meeks. Left Heart Cath (5/20/22) showed mild to moderate CAD.     At the time of examination patient denied any SOB, chest pain, dizziness, fever, chills, abdominal pain, nausea, vomiting. He states he has been compliant with his medications and diet. This is his first hospitalization for HF since his surgery in June of 2022.     Home medications:   Atenolol 50mg daily (stopped by home cardiologist prior to transferring)  Levothyroxine 100 mcg daily  Liothyronine (T3) 5mcg daily   Aspirin 325mg daily  Lasix 40mg daily  Zyrtec 10 mg daily  Singulair 10mg daily   Amiodarone 400mg bid (started during hospitalization due to NSVT and frequent PVCs)    Of note as per patient, he has taken Coreg (SOB), metoprolol (muscle cramps, depression, visual disturbance), spironolactone (parasthesia, cramps, cough), Bystolic (edema, muscle cramps, wheezing), lisinopril (muscle cramps), and losartan (cough and muscle cramps) in the past and they were discontinued by his Cardiologist after patient reported apparent side effects as seen in parenthesis.  Patient was also on anticoagulation with Eliquis and then Xarelto but was taken off due to cramping and constipation since August 2022.     FMH: Father possible HF 2/2 viral mycarditis. Grandparents (mother) ICM.     Social: Stopped smoking and alcohol 32 yrs ago. Did have 25 yr hx of 1ppd. No drugs. Lives at home with wife of 41 years. 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 Now daughter who lives next door with children takes care of both parents.       * Acute on chronic combined systolic and diastolic heart failure  Mr. Everette Ogden is a 71 y/o M w/ PMH of NICM, HFrEF (EF 15% as of 7/6/22) s/p ICD and  pacemaker, severe MR s/p MV repair (6/30/22), paroxysmal AFIB s/p MAZE and left atrial appendage resection (6/30/22), cardiac tamponade s/p pericardial window (7/17/22), bilateral carotid artery stenosis, hypothyroidism and lumbar spinal stenosis admitted for acute on chronic decompensate HF secondary to unknown cause. Possibly due to failed MV repair as noted on RUCHI at [facility]. TTE (9/12/22) and RUCHI (9/13/22) done. TTE showed EF 15-20%, LV that was moderately to severely dilated, LA that was moderately to severely dilated, RV that was moderately dilated, RA that appeared dilated, MV that moderate to severe regurgitation, AV that showed trivial regurgitation, PV with mild to moderate regurgitation, TV w/ moderate regurgitation, Pericardium w/ no effusion, PASP was upper normal, and LVIDd of 7cm. Patient noted to have elevated LA of 2.3 and AST/ALT of 296 and 350 respectively. Creatinine 1.2 near baseline of 1.05. States he has been producing good urine.     - Diuresing well. Continue Lasix 20mg/hr.  - Cont  5.   -GDMT: Cont entresto 24-26 and spironolactone 25mg daily..   - Echo 9/16: EF 20, Mild-Mod MR, Mod-sev TR, LVIdd 7.47, TAPSE 1.46. Will repeat once euvolemic.   - Maintain K>4 and Mg>2        Paroxysmal atrial fibrillation  - restart amiodarone 400mg bid.   - start heparin drip.     Hypothyroidism  - restart home medications levothyroxine and lithothyronine.   - TSH ordered.       Aftab Sue NP  Heart Transplant  [facility] - Cardiology Stepdown

## 2022-09-19 NOTE — NURSING
Plan of care discussed with patient. AAOx4. VSS. Patient remains free of falls and trauma. Patient ambulating independently, fall precautions in place. Patient has no complaints of pain. Lasix, Heparin, and  gtt infusing. PRN meds administered for cough. Discussed medications and care. Patient has no questions at this time. Will continue to monitor.

## 2022-09-19 NOTE — SUBJECTIVE & OBJECTIVE
Interval History: Feeling better. Diuresing extremely well. Cough a bit improved overnight(seems more positional).    Continuous Infusions:   DOBUTamine IV infusion (non-titrating) 2.5 mcg/kg/min (09/19/22 0757)    furosemide (LASIX) 10 mg/mL infusion (non-titrating) 20 mg/hr (09/19/22 0525)    heparin (porcine) in D5W 12 Units/kg/hr (09/18/22 1240)     Scheduled Meds:   amiodarone  400 mg Oral BID    aspirin  325 mg Oral Daily    cetirizine  10 mg Oral Daily    levothyroxine  100 mcg Oral Before breakfast    liothyronine  10 mcg Oral Daily    potassium chloride  40 mEq Oral BID    sacubitriL-valsartan  1 tablet Oral BID    sodium chloride 0.9%  10 mL Intravenous Q6H    spironolactone  25 mg Oral Daily     PRN Meds:acetaminophen, benzonatate, dextromethorphan-guaiFENesin  mg/5 ml, promethazine-codeine 6.25-10 mg/5 ml, sodium chloride 0.9%, Flushing PICC Protocol **AND** sodium chloride 0.9% **AND** sodium chloride 0.9%    Review of patient's allergies indicates:  No Known Allergies  Objective:     Vital Signs (Most Recent):  Temp: 97.5 °F (36.4 °C) (09/19/22 0734)  Pulse: 95 (09/19/22 0734)  Resp: 16 (09/19/22 0734)  BP: 114/64 (09/19/22 0734)  SpO2: 96 % (09/19/22 0734) Vital Signs (24h Range):  Temp:  [97.5 °F (36.4 °C)-97.9 °F (36.6 °C)] 97.5 °F (36.4 °C)  Pulse:  [] 95  Resp:  [16-20] 16  SpO2:  [94 %-96 %] 96 %  BP: ()/(54-80) 114/64     Patient Vitals for the past 72 hrs (Last 3 readings):   Weight   09/19/22 0437 89.5 kg (197 lb 5 oz)   09/17/22 0700 95.4 kg (210 lb 5.1 oz)       Body mass index is 26.03 kg/m².      Intake/Output Summary (Last 24 hours) at 9/19/2022 0845  Last data filed at 9/19/2022 0505  Gross per 24 hour   Intake 420 ml   Output 3150 ml   Net -2730 ml            Telemetry: Vpcd with PVCs    Physical Exam  Vitals and nursing note reviewed.   Constitutional:       Appearance: He is well-developed.   HENT:      Head: Normocephalic.   Eyes:      Pupils: Pupils are equal,  round, and reactive to light.   Neck:      Comments: JVD 3 fingers above clavicle sitting in bed.   Cardiovascular:      Rate and Rhythm: Normal rate and regular rhythm.      Heart sounds: Murmur heard.   Pulmonary:      Effort: Pulmonary effort is normal.      Breath sounds: Normal breath sounds.   Abdominal:      General: Bowel sounds are normal.      Palpations: Abdomen is soft.   Musculoskeletal:         General: Normal range of motion.      Cervical back: Normal range of motion and neck supple.   Skin:     General: Skin is warm and dry.   Neurological:      Mental Status: He is alert and oriented to person, place, and time.   Psychiatric:         Behavior: Behavior normal.       Significant Labs:  CBC:  Recent Labs   Lab 09/17/22  0521 09/18/22  0454 09/19/22  0502   WBC 7.25 6.16 7.07   RBC 4.86 5.04 5.39   HGB 12.6* 12.9* 13.7*   HCT 39.2* 41.6 43.3    169 183   MCV 81* 83 80*   MCH 25.9* 25.6* 25.4*   MCHC 32.1 31.0* 31.6*       BNP:  No results for input(s): BNP in the last 168 hours.    Invalid input(s): BNPTRIAGELBLO  CMP:  Recent Labs   Lab 09/18/22  0454 09/18/22  1709 09/19/22  0502   GLU 89 266* 100   CALCIUM 9.4 9.4 9.9   ALBUMIN 3.9 3.8 4.0   PROT 7.1 7.3 7.6    134* 136   K 3.4* 4.3 4.0   CO2 29 22* 28    96 98   BUN 23 24* 27*   CREATININE 1.1 1.3 1.3   ALKPHOS 98 100 101   * 240* 222*   * 116* 91*   BILITOT 2.3* 2.0* 2.3*        Coagulation:   Recent Labs   Lab 09/17/22  0521 09/18/22  0454 09/19/22  0502   APTT 49.3* 46.1* 45.3*       LDH:  No results for input(s): LDH in the last 72 hours.  Microbiology:  Microbiology Results (last 7 days)       ** No results found for the last 168 hours. **          I have reviewed all pertinent labs within the past 24 hours.    Estimated Creatinine Clearance: 56.3 mL/min (based on SCr of 1.3 mg/dL).    Diagnostic Results:  I have reviewed all pertinent imaging results/findings within the past 24 hours.

## 2022-09-19 NOTE — NURSING
Patient awake at this time. NAD noted. Medications administered per MAR. Dobutamine, lasix, and heparin gtt infusing at this time. Patient remains free from falls, injury, and harm. Ambulated the halls with RN in the beginning of shift. PICC and telemetry intact. Labs drawn. Awaiting results. Patient able to make needs known. PRN cough medication given last night. Bed locked in lowest position. Call bell and personal items within reach. Will continue POC

## 2022-09-19 NOTE — PROGRESS NOTES
[facility] - Cardiology Stepdown  Heart Transplant  Progress Note  Patient Name: Artemio Ogden  MRN: [id number]  Admission Date: 9/15/2022  Hospital Length of Stay: 4 days  Attending Physician: Tate Roman Jr.,*  Primary Care Provider: Danna Lanza MD  Principal Problem:Acute on chronic combined systolic and diastolic heart failure    Subjective:     Interval History: Feeling better. Diuresing extremely well. Cough a bit improved overnight(seems more positional).    Continuous Infusions:   DOBUTamine IV infusion (non-titrating) 2.5 mcg/kg/min (09/19/22 0757)    furosemide (LASIX) 10 mg/mL infusion (non-titrating) 20 mg/hr (09/19/22 0525)    heparin (porcine) in D5W 12 Units/kg/hr (09/18/22 1240)     Scheduled Meds:   amiodarone  400 mg Oral BID    aspirin  325 mg Oral Daily    cetirizine  10 mg Oral Daily    levothyroxine  100 mcg Oral Before breakfast    liothyronine  10 mcg Oral Daily    potassium chloride  40 mEq Oral BID    sacubitriL-valsartan  1 tablet Oral BID    sodium chloride 0.9%  10 mL Intravenous Q6H    spironolactone  25 mg Oral Daily     PRN Meds:acetaminophen, benzonatate, dextromethorphan-guaiFENesin  mg/5 ml, promethazine-codeine 6.25-10 mg/5 ml, sodium chloride 0.9%, Flushing PICC Protocol **AND** sodium chloride 0.9% **AND** sodium chloride 0.9%    Review of patient's allergies indicates:  No Known Allergies  Objective:     Vital Signs (Most Recent):  Temp: 97.5 °F (36.4 °C) (09/19/22 0734)  Pulse: 95 (09/19/22 0734)  Resp: 16 (09/19/22 0734)  BP: 114/64 (09/19/22 0734)  SpO2: 96 % (09/19/22 0734) Vital Signs (24h Range):  Temp:  [97.5 °F (36.4 °C)-97.9 °F (36.6 °C)] 97.5 °F (36.4 °C)  Pulse:  [] 95  Resp:  [16-20] 16  SpO2:  [94 %-96 %] 96 %  BP: ()/(54-80) 114/64     Patient Vitals for the past 72 hrs (Last 3 readings):   Weight   09/19/22 0437 89.5 kg (197 lb 5 oz)   09/17/22 0700 95.4 kg (210 lb 5.1 oz)       Body mass index is 26.03  kg/m².      Intake/Output Summary (Last 24 hours) at 9/19/2022 0845  Last data filed at 9/19/2022 0505  Gross per 24 hour   Intake 420 ml   Output 3150 ml   Net -2730 ml            Telemetry: Vpcd with PVCs    Physical Exam  Vitals and nursing note reviewed.   Constitutional:       Appearance: He is well-developed.   HENT:      Head: Normocephalic.   Eyes:      Pupils: Pupils are equal, round, and reactive to light.   Neck:      Comments: JVD 3 fingers above clavicle sitting in bed.   Cardiovascular:      Rate and Rhythm: Normal rate and regular rhythm.      Heart sounds: Murmur heard.   Pulmonary:      Effort: Pulmonary effort is normal.      Breath sounds: Normal breath sounds.   Abdominal:      General: Bowel sounds are normal.      Palpations: Abdomen is soft.   Musculoskeletal:         General: Normal range of motion.      Cervical back: Normal range of motion and neck supple.   Skin:     General: Skin is warm and dry.   Neurological:      Mental Status: He is alert and oriented to person, place, and time.   Psychiatric:         Behavior: Behavior normal.       Significant Labs:  CBC:  Recent Labs   Lab 09/17/22  0521 09/18/22  0454 09/19/22  0502   WBC 7.25 6.16 7.07   RBC 4.86 5.04 5.39   HGB 12.6* 12.9* 13.7*   HCT 39.2* 41.6 43.3    169 183   MCV 81* 83 80*   MCH 25.9* 25.6* 25.4*   MCHC 32.1 31.0* 31.6*       BNP:  No results for input(s): BNP in the last 168 hours.    Invalid input(s): BNPTRIAGELBLO  CMP:  Recent Labs   Lab 09/18/22  0454 09/18/22  1709 09/19/22  0502   GLU 89 266* 100   CALCIUM 9.4 9.4 9.9   ALBUMIN 3.9 3.8 4.0   PROT 7.1 7.3 7.6    134* 136   K 3.4* 4.3 4.0   CO2 29 22* 28    96 98   BUN 23 24* 27*   CREATININE 1.1 1.3 1.3   ALKPHOS 98 100 101   * 240* 222*   * 116* 91*   BILITOT 2.3* 2.0* 2.3*        Coagulation:   Recent Labs   Lab 09/17/22  0521 09/18/22  0454 09/19/22  0502   APTT 49.3* 46.1* 45.3*       LDH:  No results for input(s): LDH in the last  72 hours.  Microbiology:  Microbiology Results (last 7 days)       ** No results found for the last 168 hours. **          I have reviewed all pertinent labs within the past 24 hours.    Estimated Creatinine Clearance: 56.3 mL/min (based on SCr of 1.3 mg/dL).    Diagnostic Results:  I have reviewed all pertinent imaging results/findings within the past 24 hours.    Assessment and Plan:     Mr. Everette Ogden is a 73 y/o M w/ PMH of NICM, HFrEF (EF 15% as of 7/6/22) s/p ICD and pacemaker, severe MR s/p MV repair (6/30/22), paroxysmal AFIB s/p MAZE and left atrial appendage resection (6/30/22), cardiac tamponade s/p pericardial window (7/17/22), bilateral carotid artery stenosis, hypothyroidism and lumbar spinal stenosis who initially presented to [facility] on 9/11 endorsing worsening SOB and continuous hacking cough with pink sputum. He was admitted with acute decompensated heart failure and started on Lasix drip of 5 and dobutamine 2.5. During his hospitalization there patient underwent several procedures including a CTA for an elevated d-dimer which was negative for PE but showed a small right sided pleural effusion. Patient also had a TTE (9/12/22) and RUCHI (9/13/22) done. TTE showed EF 15-20%, LV that was moderately to severely dilated, LA that was moderately to severely dilated, RV that was moderately dilated, RA that appeared dilated, MV that moderate to severe regurgitation, AV that showed trivial regurgitation, PV with mild to moderate regurgitation, TV w/ moderate regurgitation, Pericardium w/ no effusion, PASP was upper normal, and LVIDd of 7cm. RUCHI revealed severely depressed LV systolic function, EF of 15-20%, moderate MR after failed mitral valve repair, dilated right chambers. Patient was transferred to [facility] for higher level of care for his decompensated heart failure presenting with  2.5 and Lasix 5 drip.     Of note patient is s/p MVR/MAZE/ablation at [facility] on 6/5/22 and  subsequently was admitted to [facility] on 7/17/22 w/ a large pericardial effusion w/ cardiac tamponade and sternal dehiscence. He underwent pericardial window, thoracostomy, and sternal debridement per Dr. Meeks. Left Heart Cath (5/20/22) showed mild to moderate CAD.     At the time of examination patient denied any SOB, chest pain, dizziness, fever, chills, abdominal pain, nausea, vomiting. He states he has been compliant with his medications and diet. This is his first hospitalization for HF since his surgery in June of 2022.     Home medications:   Atenolol 50mg daily (stopped by home cardiologist prior to transferring)  Levothyroxine 100 mcg daily  Liothyronine (T3) 5mcg daily   Aspirin 325mg daily  Lasix 40mg daily  Zyrtec 10 mg daily  Singulair 10mg daily   Amiodarone 400mg bid (started during hospitalization due to NSVT and frequent PVCs)    Of note as per patient, he has taken Coreg (SOB), metoprolol (muscle cramps, depression, visual disturbance), spironolactone (parasthesia, cramps, cough), Bystolic (edema, muscle cramps, wheezing), lisinopril (muscle cramps), and losartan (cough and muscle cramps) in the past and they were discontinued by his Cardiologist after patient reported apparent side effects as seen in parenthesis.  Patient was also on anticoagulation with Eliquis and then Xarelto but was taken off due to cramping and constipation since August 2022.     FMH: Father possible HF 2/2 viral mycarditis. Grandparents (mother) ICM.     Social: Stopped smoking and alcohol 32 yrs ago. Did have 25 yr hx of 1ppd. No drugs. Lives at home with wife of 41 years. 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 Now daughter who lives next door with children takes care of both parents.       * Acute on chronic combined systolic and diastolic heart failure  Mr. Everette Ogden is a 71 y/o M w/ PMH of NICM, HFrEF (EF 15% as of 7/6/22) s/p ICD and pacemaker, severe MR  s/p MV repair (6/30/22), paroxysmal AFIB s/p MAZE and left atrial appendage resection (6/30/22), cardiac tamponade s/p pericardial window (7/17/22), bilateral carotid artery stenosis, hypothyroidism and lumbar spinal stenosis admitted for acute on chronic decompensate HF secondary to unknown cause. Possibly due to failed MV repair as noted on RUCHI at [facility]. TTE (9/12/22) and RUCHI (9/13/22) done. TTE showed EF 15-20%, LV that was moderately to severely dilated, LA that was moderately to severely dilated, RV that was moderately dilated, RA that appeared dilated, MV that moderate to severe regurgitation, AV that showed trivial regurgitation, PV with mild to moderate regurgitation, TV w/ moderate regurgitation, Pericardium w/ no effusion, PASP was upper normal, and LVIDd of 7cm. Patient noted to have elevated LA of 2.3 and AST/ALT of 296 and 350 respectively. Creatinine 1.2 near baseline of 1.05. States he has been producing good urine.     - Diuresing well. Continue Lasix 20mg/hr.  - Cont  5. Will wean to 2.5 today and hopefully wean off tomorrow.   - GDMT: Cont entresto 24-26(tolerating well) and spironolactone 25mg daily.   - Echo 9/16: EF 20, Mild-Mod MR, Mod-sev TR, LVIdd 7.47, TAPSE 1.46. Will repeat once euvolemic.   - Maintain K>4 and Mg>2  - PT consult.    Paroxysmal atrial fibrillation  - On Amiodarone 400mg bid.   - Cont heparin drip. Transition to apixiban when necessary.    Hypothyroidism  - cont levothyroxine and lithothyronine.         Aftab Sue, NP  Heart Transplant  [facility] - Cardiology Stepdown

## 2022-09-19 NOTE — SUBJECTIVE & OBJECTIVE
Interval History: Feeling better. Diuresing extremely well. Has a dry cough overnight but it has slowed this am.    Continuous Infusions:   DOBUTamine IV infusion (non-titrating) 5 mcg/kg/min (09/18/22 2051)    furosemide (LASIX) 10 mg/mL infusion (non-titrating) 20 mg/hr (09/19/22 0525)    heparin (porcine) in D5W 12 Units/kg/hr (09/18/22 1240)     Scheduled Meds:   amiodarone  400 mg Oral BID    aspirin  325 mg Oral Daily    cetirizine  10 mg Oral Daily    levothyroxine  100 mcg Oral Before breakfast    liothyronine  10 mcg Oral Daily    potassium chloride  40 mEq Oral TID    sacubitriL-valsartan  1 tablet Oral BID    sodium chloride 0.9%  10 mL Intravenous Q6H    spironolactone  25 mg Oral Daily     PRN Meds:acetaminophen, benzonatate, dextromethorphan-guaiFENesin  mg/5 ml, promethazine-codeine 6.25-10 mg/5 ml, sodium chloride 0.9%, Flushing PICC Protocol **AND** sodium chloride 0.9% **AND** sodium chloride 0.9%    Review of patient's allergies indicates:  No Known Allergies  Objective:     Vital Signs (Most Recent):  Temp: 97.6 °F (36.4 °C) (09/19/22 0441)  Pulse: 91 (09/19/22 0441)  Resp: 20 (09/19/22 0441)  BP: 115/80 (09/19/22 0441)  SpO2: 95 % (09/19/22 0441) Vital Signs (24h Range):  Temp:  [97.5 °F (36.4 °C)-97.9 °F (36.6 °C)] 97.6 °F (36.4 °C)  Pulse:  [] 91  Resp:  [16-20] 20  SpO2:  [94 %-95 %] 95 %  BP: ()/(54-80) 115/80     Patient Vitals for the past 72 hrs (Last 3 readings):   Weight   09/19/22 0437 89.5 kg (197 lb 5 oz)   09/17/22 0700 95.4 kg (210 lb 5.1 oz)   09/16/22 0742 103 kg (227 lb)       Body mass index is 26.03 kg/m².      Intake/Output Summary (Last 24 hours) at 9/19/2022 0719  Last data filed at 9/19/2022 0505  Gross per 24 hour   Intake 780 ml   Output 3550 ml   Net -2770 ml            Telemetry: Vpcd with PVCs    Physical Exam  Vitals and nursing note reviewed.   Constitutional:       Appearance: He is well-developed.   HENT:      Head: Normocephalic.   Eyes:       Pupils: Pupils are equal, round, and reactive to light.   Neck:      Comments: JVD 3 fingers above clavicle sitting in bed.   Cardiovascular:      Rate and Rhythm: Normal rate and regular rhythm.      Heart sounds: Murmur heard.   Pulmonary:      Effort: Pulmonary effort is normal.      Breath sounds: Normal breath sounds.   Abdominal:      General: Bowel sounds are normal.      Palpations: Abdomen is soft.   Musculoskeletal:         General: Normal range of motion.      Cervical back: Normal range of motion and neck supple.   Skin:     General: Skin is warm and dry.   Neurological:      Mental Status: He is alert and oriented to person, place, and time.   Psychiatric:         Behavior: Behavior normal.       Significant Labs:  CBC:  Recent Labs   Lab 09/17/22  0521 09/18/22  0454 09/19/22  0502   WBC 7.25 6.16 7.07   RBC 4.86 5.04 5.39   HGB 12.6* 12.9* 13.7*   HCT 39.2* 41.6 43.3    169 183   MCV 81* 83 80*   MCH 25.9* 25.6* 25.4*   MCHC 32.1 31.0* 31.6*       BNP:  No results for input(s): BNP in the last 168 hours.    Invalid input(s): BNPTRIAGELBLO  CMP:  Recent Labs   Lab 09/18/22  0454 09/18/22  1709 09/19/22  0502   GLU 89 266* 100   CALCIUM 9.4 9.4 9.9   ALBUMIN 3.9 3.8 4.0   PROT 7.1 7.3 7.6    134* 136   K 3.4* 4.3 4.0   CO2 29 22* 28    96 98   BUN 23 24* 27*   CREATININE 1.1 1.3 1.3   ALKPHOS 98 100 101   * 240* 222*   * 116* 91*   BILITOT 2.3* 2.0* 2.3*        Coagulation:   Recent Labs   Lab 09/17/22  0521 09/18/22  0454 09/19/22  0502   APTT 49.3* 46.1* 45.3*       LDH:  No results for input(s): LDH in the last 72 hours.  Microbiology:  Microbiology Results (last 7 days)       ** No results found for the last 168 hours. **          I have reviewed all pertinent labs within the past 24 hours.    Estimated Creatinine Clearance: 56.3 mL/min (based on SCr of 1.3 mg/dL).    Diagnostic Results:  I have reviewed all pertinent imaging results/findings within the past 24  hours.

## 2022-09-20 LAB
ALBUMIN SERPL BCP-MCNC: 4.1 G/DL (ref 3.5–5.2)
ALBUMIN SERPL BCP-MCNC: 4.1 G/DL (ref 3.5–5.2)
ALLENS TEST: ABNORMAL
ALP SERPL-CCNC: 100 U/L (ref 55–135)
ALP SERPL-CCNC: 97 U/L (ref 55–135)
ALT SERPL W/O P-5'-P-CCNC: 152 U/L (ref 10–44)
ALT SERPL W/O P-5'-P-CCNC: 173 U/L (ref 10–44)
ANION GAP SERPL CALC-SCNC: 13 MMOL/L (ref 8–16)
ANION GAP SERPL CALC-SCNC: 13 MMOL/L (ref 8–16)
APTT BLDCRRT: 55.1 SEC (ref 21–32)
AST SERPL-CCNC: 50 U/L (ref 10–40)
AST SERPL-CCNC: 59 U/L (ref 10–40)
BASOPHILS # BLD AUTO: 0.06 K/UL (ref 0–0.2)
BASOPHILS NFR BLD: 0.7 % (ref 0–1.9)
BILIRUB SERPL-MCNC: 2.1 MG/DL (ref 0.1–1)
BILIRUB SERPL-MCNC: 2.2 MG/DL (ref 0.1–1)
BUN SERPL-MCNC: 35 MG/DL (ref 8–23)
BUN SERPL-MCNC: 37 MG/DL (ref 8–23)
CALCIUM SERPL-MCNC: 10 MG/DL (ref 8.7–10.5)
CALCIUM SERPL-MCNC: 10.1 MG/DL (ref 8.7–10.5)
CHLORIDE SERPL-SCNC: 95 MMOL/L (ref 95–110)
CHLORIDE SERPL-SCNC: 96 MMOL/L (ref 95–110)
CO2 SERPL-SCNC: 25 MMOL/L (ref 23–29)
CO2 SERPL-SCNC: 26 MMOL/L (ref 23–29)
CREAT SERPL-MCNC: 1.6 MG/DL (ref 0.5–1.4)
CREAT SERPL-MCNC: 1.7 MG/DL (ref 0.5–1.4)
DIFFERENTIAL METHOD: ABNORMAL
EOSINOPHIL # BLD AUTO: 0.2 K/UL (ref 0–0.5)
EOSINOPHIL NFR BLD: 2.7 % (ref 0–8)
ERYTHROCYTE [DISTWIDTH] IN BLOOD BY AUTOMATED COUNT: 17.9 % (ref 11.5–14.5)
EST. GFR  (NO RACE VARIABLE): 41.8 ML/MIN/1.73 M^2
EST. GFR  (NO RACE VARIABLE): 44.9 ML/MIN/1.73 M^2
GLUCOSE SERPL-MCNC: 102 MG/DL (ref 70–110)
GLUCOSE SERPL-MCNC: 99 MG/DL (ref 70–110)
HCO3 UR-SCNC: 29.6 MMOL/L (ref 24–28)
HCT VFR BLD AUTO: 45.2 % (ref 40–54)
HGB BLD-MCNC: 14.3 G/DL (ref 14–18)
IMM GRANULOCYTES # BLD AUTO: 0.03 K/UL (ref 0–0.04)
IMM GRANULOCYTES NFR BLD AUTO: 0.4 % (ref 0–0.5)
LYMPHOCYTES # BLD AUTO: 1.3 K/UL (ref 1–4.8)
LYMPHOCYTES NFR BLD: 16.6 % (ref 18–48)
MAGNESIUM SERPL-MCNC: 2.2 MG/DL (ref 1.6–2.6)
MAGNESIUM SERPL-MCNC: 2.2 MG/DL (ref 1.6–2.6)
MCH RBC QN AUTO: 25.3 PG (ref 27–31)
MCHC RBC AUTO-ENTMCNC: 31.6 G/DL (ref 32–36)
MCV RBC AUTO: 80 FL (ref 82–98)
MONOCYTES # BLD AUTO: 0.8 K/UL (ref 0.3–1)
MONOCYTES NFR BLD: 10.4 % (ref 4–15)
NEUTROPHILS # BLD AUTO: 5.6 K/UL (ref 1.8–7.7)
NEUTROPHILS NFR BLD: 69.2 % (ref 38–73)
NRBC BLD-RTO: 0 /100 WBC
PCO2 BLDA: 46.5 MMHG (ref 35–45)
PH SMN: 7.41 [PH] (ref 7.35–7.45)
PLATELET # BLD AUTO: 228 K/UL (ref 150–450)
PMV BLD AUTO: 10 FL (ref 9.2–12.9)
PO2 BLDA: 31 MMHG (ref 40–60)
POC BE: 5 MMOL/L
POC SATURATED O2: 59 % (ref 95–100)
POC TCO2: 31 MMOL/L (ref 24–29)
POTASSIUM SERPL-SCNC: 4.1 MMOL/L (ref 3.5–5.1)
POTASSIUM SERPL-SCNC: 4.7 MMOL/L (ref 3.5–5.1)
PROT SERPL-MCNC: 7.8 G/DL (ref 6–8.4)
PROT SERPL-MCNC: 8 G/DL (ref 6–8.4)
RBC # BLD AUTO: 5.65 M/UL (ref 4.6–6.2)
SAMPLE: ABNORMAL
SITE: ABNORMAL
SODIUM SERPL-SCNC: 133 MMOL/L (ref 136–145)
SODIUM SERPL-SCNC: 135 MMOL/L (ref 136–145)
WBC # BLD AUTO: 8.06 K/UL (ref 3.9–12.7)

## 2022-09-20 PROCEDURE — 63600175 PHARM REV CODE 636 W HCPCS: Performed by: NURSE PRACTITIONER

## 2022-09-20 PROCEDURE — A4216 STERILE WATER/SALINE, 10 ML: HCPCS | Performed by: INTERNAL MEDICINE

## 2022-09-20 PROCEDURE — 20600001 HC STEP DOWN PRIVATE ROOM

## 2022-09-20 PROCEDURE — 99900035 HC TECH TIME PER 15 MIN (STAT)

## 2022-09-20 PROCEDURE — 85730 THROMBOPLASTIN TIME PARTIAL: CPT | Performed by: STUDENT IN AN ORGANIZED HEALTH CARE EDUCATION/TRAINING PROGRAM

## 2022-09-20 PROCEDURE — 83735 ASSAY OF MAGNESIUM: CPT | Mod: 91 | Performed by: STUDENT IN AN ORGANIZED HEALTH CARE EDUCATION/TRAINING PROGRAM

## 2022-09-20 PROCEDURE — 63600175 PHARM REV CODE 636 W HCPCS: Performed by: STUDENT IN AN ORGANIZED HEALTH CARE EDUCATION/TRAINING PROGRAM

## 2022-09-20 PROCEDURE — 25000003 PHARM REV CODE 250: Performed by: NURSE PRACTITIONER

## 2022-09-20 PROCEDURE — 85025 COMPLETE CBC W/AUTO DIFF WBC: CPT | Performed by: STUDENT IN AN ORGANIZED HEALTH CARE EDUCATION/TRAINING PROGRAM

## 2022-09-20 PROCEDURE — 25000003 PHARM REV CODE 250: Performed by: PHYSICIAN ASSISTANT

## 2022-09-20 PROCEDURE — 25000003 PHARM REV CODE 250: Performed by: INTERNAL MEDICINE

## 2022-09-20 PROCEDURE — 99233 PR SUBSEQUENT HOSPITAL CARE,LEVL III: ICD-10-PCS | Mod: ,,, | Performed by: PHYSICIAN ASSISTANT

## 2022-09-20 PROCEDURE — 80053 COMPREHEN METABOLIC PANEL: CPT | Performed by: STUDENT IN AN ORGANIZED HEALTH CARE EDUCATION/TRAINING PROGRAM

## 2022-09-20 PROCEDURE — 25000003 PHARM REV CODE 250: Performed by: STUDENT IN AN ORGANIZED HEALTH CARE EDUCATION/TRAINING PROGRAM

## 2022-09-20 PROCEDURE — 99233 SBSQ HOSP IP/OBS HIGH 50: CPT | Mod: ,,, | Performed by: PHYSICIAN ASSISTANT

## 2022-09-20 RX ORDER — POLYETHYLENE GLYCOL 3350 17 G/17G
17 POWDER, FOR SOLUTION ORAL DAILY
Status: DISCONTINUED | OUTPATIENT
Start: 2022-09-20 | End: 2022-09-23 | Stop reason: HOSPADM

## 2022-09-20 RX ORDER — HYDROXYZINE PAMOATE 25 MG/1
25 CAPSULE ORAL ONCE
Status: COMPLETED | OUTPATIENT
Start: 2022-09-20 | End: 2022-09-20

## 2022-09-20 RX ORDER — AMOXICILLIN 250 MG
1 CAPSULE ORAL DAILY PRN
Status: DISCONTINUED | OUTPATIENT
Start: 2022-09-20 | End: 2022-09-23 | Stop reason: HOSPADM

## 2022-09-20 RX ADMIN — SPIRONOLACTONE 25 MG: 25 TABLET, FILM COATED ORAL at 08:09

## 2022-09-20 RX ADMIN — POTASSIUM CHLORIDE 40 MEQ: 1500 TABLET, EXTENDED RELEASE ORAL at 08:09

## 2022-09-20 RX ADMIN — HEPARIN SODIUM 12 UNITS/KG/HR: 5000 INJECTION INTRAVENOUS; SUBCUTANEOUS at 06:09

## 2022-09-20 RX ADMIN — Medication 10 ML: at 05:09

## 2022-09-20 RX ADMIN — AMIODARONE HYDROCHLORIDE 400 MG: 200 TABLET ORAL at 08:09

## 2022-09-20 RX ADMIN — Medication 10 ML: at 12:09

## 2022-09-20 RX ADMIN — LIOTHYRONINE SODIUM 10 MCG: 5 TABLET ORAL at 08:09

## 2022-09-20 RX ADMIN — GUAIFENESIN AND DEXTROMETHORPHAN 5 ML: 100; 10 SYRUP ORAL at 10:09

## 2022-09-20 RX ADMIN — FUROSEMIDE 20 MG/HR: 10 INJECTION, SOLUTION INTRAMUSCULAR; INTRAVENOUS at 12:09

## 2022-09-20 RX ADMIN — GUAIFENESIN AND DEXTROMETHORPHAN 5 ML: 100; 10 SYRUP ORAL at 08:09

## 2022-09-20 RX ADMIN — ACETAMINOPHEN 650 MG: 325 TABLET ORAL at 05:09

## 2022-09-20 RX ADMIN — ACETAMINOPHEN 650 MG: 325 TABLET ORAL at 12:09

## 2022-09-20 RX ADMIN — SACUBITRIL AND VALSARTAN 1 TABLET: 24; 26 TABLET, FILM COATED ORAL at 08:09

## 2022-09-20 RX ADMIN — ACETAMINOPHEN 650 MG: 325 TABLET ORAL at 10:09

## 2022-09-20 RX ADMIN — CETIRIZINE HYDROCHLORIDE 10 MG: 10 TABLET, FILM COATED ORAL at 08:09

## 2022-09-20 RX ADMIN — LEVOTHYROXINE SODIUM 100 MCG: 100 TABLET ORAL at 05:09

## 2022-09-20 RX ADMIN — ASPIRIN 325 MG ORAL TABLET 325 MG: 325 PILL ORAL at 08:09

## 2022-09-20 RX ADMIN — HYDROXYZINE PAMOATE 25 MG: 25 CAPSULE ORAL at 03:09

## 2022-09-20 RX ADMIN — POLYETHYLENE GLYCOL 3350 17 G: 17 POWDER, FOR SOLUTION ORAL at 01:09

## 2022-09-20 NOTE — NURSING
Patient awake at this time. NAD noted. Medications administered per MAR. Dobutamine, lasix, and heparin gtt infusing at this time. Patient remains free from falls, injury, and harm. PICC and telemetry intact. Labs drawn. Bed locked in lowest position. Call bell and personal items within reach. Will continue POC

## 2022-09-20 NOTE — SUBJECTIVE & OBJECTIVE
Interval History: net negative another 1.86L/24h on  2.5, lasix 20mg/hr, and low dose entresto. Review of blood pressures reveal systolics mostly ~ 100, this morning 114/58 and 112/59. Will increase entresto for pm dose, cut  off. Appears euvolemic on exam, awaiting CVP via picc but with plans to increase entresto will decrease diuresis as well.       Continuous Infusions:   DOBUTamine IV infusion (non-titrating) 2.5 mcg/kg/min (09/20/22 1308)    heparin (porcine) in D5W 12 Units/kg/hr (09/19/22 1553)     Scheduled Meds:   amiodarone  400 mg Oral BID    aspirin  325 mg Oral Daily    cetirizine  10 mg Oral Daily    hydrOXYzine pamoate  25 mg Oral Once    levothyroxine  100 mcg Oral Before breakfast    liothyronine  10 mcg Oral Daily    polyethylene glycol  17 g Oral Daily    potassium chloride  40 mEq Oral BID    sacubitriL-valsartan  1 tablet Oral BID    sodium chloride 0.9%  10 mL Intravenous Q6H    spironolactone  25 mg Oral Daily     PRN Meds:acetaminophen, benzonatate, dextromethorphan-guaiFENesin  mg/5 ml, promethazine-codeine 6.25-10 mg/5 ml, senna-docusate 8.6-50 mg, sodium chloride 0.9%, Flushing PICC Protocol **AND** sodium chloride 0.9% **AND** sodium chloride 0.9%    Review of patient's allergies indicates:  No Known Allergies  Objective:     Vital Signs (Most Recent):  Temp: 98.2 °F (36.8 °C) (09/20/22 1210)  Pulse: 91 (09/20/22 1210)  Resp: 18 (09/20/22 1210)  BP: (!) 114/58 (09/20/22 1210)  SpO2: 97 % (09/20/22 1210) Vital Signs (24h Range):  Temp:  [97 °F (36.1 °C)-98.6 °F (37 °C)] 98.2 °F (36.8 °C)  Pulse:  [] 91  Resp:  [18-20] 18  SpO2:  [95 %-97 %] 97 %  BP: ()/(58-64) 114/58     Patient Vitals for the past 72 hrs (Last 3 readings):   Weight   09/20/22 0500 86.5 kg (190 lb 11.2 oz)   09/19/22 1424 89.4 kg (197 lb)   09/19/22 0437 89.5 kg (197 lb 5 oz)       Body mass index is 25.16 kg/m².      Intake/Output Summary (Last 24 hours) at 9/20/2022 1414  Last data filed at  9/20/2022 1200  Gross per 24 hour   Intake 1262 ml   Output 3250 ml   Net -1988 ml            Telemetry: Vpcd with PVCs    Physical Exam  Vitals and nursing note reviewed.   Constitutional:       Appearance: He is well-developed.   HENT:      Head: Normocephalic.   Eyes:      Pupils: Pupils are equal, round, and reactive to light.   Neck:      Comments: JVP right above clavicle  Cardiovascular:      Rate and Rhythm: Normal rate and regular rhythm.      Heart sounds: Murmur heard.   Pulmonary:      Effort: Pulmonary effort is normal.      Breath sounds: Normal breath sounds.   Abdominal:      General: Bowel sounds are normal.      Palpations: Abdomen is soft.   Genitourinary:     Rectum: Guaiac stool: \.   Musculoskeletal:         General: Normal range of motion.      Cervical back: Normal range of motion and neck supple.   Skin:     General: Skin is warm and dry.   Neurological:      Mental Status: He is alert and oriented to person, place, and time.   Psychiatric:         Behavior: Behavior normal.       Significant Labs:  CBC:  Recent Labs   Lab 09/18/22 0454 09/19/22 0502 09/20/22 0437   WBC 6.16 7.07 8.06   RBC 5.04 5.39 5.65   HGB 12.9* 13.7* 14.3   HCT 41.6 43.3 45.2    183 228   MCV 83 80* 80*   MCH 25.6* 25.4* 25.3*   MCHC 31.0* 31.6* 31.6*       BNP:  No results for input(s): BNP in the last 168 hours.    Invalid input(s): BNPTRIAGELBLO  CMP:  Recent Labs   Lab 09/19/22 0502 09/19/22  1629 09/20/22 0437    153* 99   CALCIUM 9.9 10.2 10.1   ALBUMIN 4.0 4.0 4.1   PROT 7.6 7.8 7.8    133* 135*   K 4.0 4.6 4.1   CO2 28 29 26   CL 98 98 96   BUN 27* 32* 35*   CREATININE 1.3 1.8* 1.6*   ALKPHOS 101 110 97   * 197* 173*   AST 91* 73* 59*   BILITOT 2.3* 1.8* 2.2*        Coagulation:   Recent Labs   Lab 09/18/22 0454 09/19/22  0502 09/20/22 0437   APTT 46.1* 45.3* 55.1*       LDH:  No results for input(s): LDH in the last 72 hours.  Microbiology:  Microbiology Results (last 7 days)        ** No results found for the last 168 hours. **          I have reviewed all pertinent labs within the past 24 hours.    Estimated Creatinine Clearance: 45.8 mL/min (A) (based on SCr of 1.6 mg/dL (H)).    Diagnostic Results:  I have reviewed all pertinent imaging results/findings within the past 24 hours.

## 2022-09-20 NOTE — ASSESSMENT & PLAN NOTE
Mr. Everette Ogden is a 73 y/o M w/ PMH of NICM, HFrEF (EF 15% as of 7/6/22) s/p ICD and pacemaker, severe MR s/p MV repair (6/30/22), paroxysmal AFIB s/p MAZE and left atrial appendage resection (6/30/22), cardiac tamponade s/p pericardial window (7/17/22), bilateral carotid artery stenosis, hypothyroidism and lumbar spinal stenosis admitted for acute on chronic decompensate HF secondary to unknown cause. Possibly due to failed MV repair as noted on RUCHI at [place]. TTE (9/12/22) and RUCHI (9/13/22) done. TTE showed EF 15-20%, LV that was moderately to severely dilated, LA that was moderately to severely dilated, RV that was moderately dilated, RA that appeared dilated, MV that moderate to severe regurgitation, AV that showed trivial regurgitation, PV with mild to moderate regurgitation, TV w/ moderate regurgitation, Pericardium w/ no effusion, PASP was upper normal, and LVIDd of 7cm. Patient noted to have elevated LA of 2.3 and AST/ALT of 296 and 350 respectively. Creatinine 1.2 near baseline of 1.05. States he has been producing good urine.     - Diuresing well. Will decrease lasix gtt.   - Cont  2.5 for now, has tolerated entresto well thus far. Plan to increase entresto tonight and will wean  to off with increased dose..   - GDMT: Cont entresto 24-26(tolerating well, increase to 49-51 tonight) and spironolactone 25mg daily.   - Echo 9/16: EF 20, Mild-Mod MR, Mod-sev TR, LVIdd 7.47, TAPSE 1.46. Will repeat once euvolemic.   - Repeat echo 9/19: EF 15-20%, moderate-severely reduced RVSF, LVEDD 8.2cm.   - Maintain K>4 and Mg>2  - PT consult.   oral

## 2022-09-20 NOTE — CARE UPDATE
RAPID RESPONSE NURSE ROUND       Rounding completed with charge RNErika. Instructed to call [phone] for further concerns or assistance.

## 2022-09-20 NOTE — PT/OT/SLP PROGRESS
Physical Therapy Screen and Discharge      Patient Name:  Artemio Ogden   MRN:  [id number]    Patient reports he is functioning at his baseline and he has been ambulating up in the hallway with IND.  Pt educated on participating in x4 walks daily as well as PT POC going forward.  Pt does not require skilled PT at this time, please re-consult as appropriate.

## 2022-09-20 NOTE — PROGRESS NOTES
[facility] - Cardiology Stepdown  Heart Transplant  Progress Note    Patient Name: Artemio Ogden  MRN: [id number]  Admission Date: 9/15/2022  Hospital Length of Stay: 5 days  Attending Physician: Keila Lam MD  Primary Care Provider: Danna Lanza MD  Principal Problem:Acute on chronic combined systolic and diastolic heart failure    Subjective:     Interval History: net negative another 1.86L/24h on  2.5, lasix 20mg/hr, and low dose entresto. Review of blood pressures reveal systolics mostly ~ 100, this morning 114/58 and 112/59. Will increase entresto for pm dose, cut  off. Appears euvolemic on exam, awaiting CVP via picc but with plans to increase entresto will decrease diuresis as well.       Continuous Infusions:   DOBUTamine IV infusion (non-titrating) 2.5 mcg/kg/min (09/20/22 1308)    heparin (porcine) in D5W 12 Units/kg/hr (09/19/22 1553)     Scheduled Meds:   amiodarone  400 mg Oral BID    aspirin  325 mg Oral Daily    cetirizine  10 mg Oral Daily    hydrOXYzine pamoate  25 mg Oral Once    levothyroxine  100 mcg Oral Before breakfast    liothyronine  10 mcg Oral Daily    polyethylene glycol  17 g Oral Daily    potassium chloride  40 mEq Oral BID    sacubitriL-valsartan  1 tablet Oral BID    sodium chloride 0.9%  10 mL Intravenous Q6H    spironolactone  25 mg Oral Daily     PRN Meds:acetaminophen, benzonatate, dextromethorphan-guaiFENesin  mg/5 ml, promethazine-codeine 6.25-10 mg/5 ml, senna-docusate 8.6-50 mg, sodium chloride 0.9%, Flushing PICC Protocol **AND** sodium chloride 0.9% **AND** sodium chloride 0.9%    Review of patient's allergies indicates:  No Known Allergies  Objective:     Vital Signs (Most Recent):  Temp: 98.2 °F (36.8 °C) (09/20/22 1210)  Pulse: 91 (09/20/22 1210)  Resp: 18 (09/20/22 1210)  BP: (!) 114/58 (09/20/22 1210)  SpO2: 97 % (09/20/22 1210) Vital Signs (24h Range):  Temp:  [97 °F (36.1 °C)-98.6 °F (37 °C)] 98.2 °F (36.8 °C)  Pulse:  []  91  Resp:  [18-20] 18  SpO2:  [95 %-97 %] 97 %  BP: ()/(58-64) 114/58     Patient Vitals for the past 72 hrs (Last 3 readings):   Weight   09/20/22 0500 86.5 kg (190 lb 11.2 oz)   09/19/22 1424 89.4 kg (197 lb)   09/19/22 0437 89.5 kg (197 lb 5 oz)       Body mass index is 25.16 kg/m².      Intake/Output Summary (Last 24 hours) at 9/20/2022 1414  Last data filed at 9/20/2022 1200  Gross per 24 hour   Intake 1262 ml   Output 3250 ml   Net -1988 ml            Telemetry: Vpcd with PVCs    Physical Exam  Vitals and nursing note reviewed.   Constitutional:       Appearance: He is well-developed.   HENT:      Head: Normocephalic.   Eyes:      Pupils: Pupils are equal, round, and reactive to light.   Neck:      Comments: JVP right above clavicle  Cardiovascular:      Rate and Rhythm: Normal rate and regular rhythm.      Heart sounds: Murmur heard.   Pulmonary:      Effort: Pulmonary effort is normal.      Breath sounds: Normal breath sounds.   Abdominal:      General: Bowel sounds are normal.      Palpations: Abdomen is soft.   Genitourinary:     Rectum: Guaiac stool: \.   Musculoskeletal:         General: Normal range of motion.      Cervical back: Normal range of motion and neck supple.   Skin:     General: Skin is warm and dry.   Neurological:      Mental Status: He is alert and oriented to person, place, and time.   Psychiatric:         Behavior: Behavior normal.       Significant Labs:  CBC:  Recent Labs   Lab 09/18/22  0454 09/19/22  0502 09/20/22  0437   WBC 6.16 7.07 8.06   RBC 5.04 5.39 5.65   HGB 12.9* 13.7* 14.3   HCT 41.6 43.3 45.2    183 228   MCV 83 80* 80*   MCH 25.6* 25.4* 25.3*   MCHC 31.0* 31.6* 31.6*       BNP:  No results for input(s): BNP in the last 168 hours.    Invalid input(s): BNPTRIAGELBLO  CMP:  Recent Labs   Lab 09/19/22  0502 09/19/22  1629 09/20/22  0437    153* 99   CALCIUM 9.9 10.2 10.1   ALBUMIN 4.0 4.0 4.1   PROT 7.6 7.8 7.8    133* 135*   K 4.0 4.6 4.1   CO2 28  29 26   CL 98 98 96   BUN 27* 32* 35*   CREATININE 1.3 1.8* 1.6*   ALKPHOS 101 110 97   * 197* 173*   AST 91* 73* 59*   BILITOT 2.3* 1.8* 2.2*        Coagulation:   Recent Labs   Lab 09/18/22  0454 09/19/22  0502 09/20/22  0437   APTT 46.1* 45.3* 55.1*       LDH:  No results for input(s): LDH in the last 72 hours.  Microbiology:  Microbiology Results (last 7 days)       ** No results found for the last 168 hours. **          I have reviewed all pertinent labs within the past 24 hours.    Estimated Creatinine Clearance: 45.8 mL/min (A) (based on SCr of 1.6 mg/dL (H)).    Diagnostic Results:  I have reviewed all pertinent imaging results/findings within the past 24 hours.    Assessment and Plan:     Mr. Everette Ogden is a 73 y/o M w/ PMH of NICM, HFrEF (EF 15% as of 7/6/22) s/p ICD and pacemaker, severe MR s/p MV repair (6/30/22), paroxysmal AFIB s/p MAZE and left atrial appendage resection (6/30/22), cardiac tamponade s/p pericardial window (7/17/22), bilateral carotid artery stenosis, hypothyroidism and lumbar spinal stenosis who initially presented to [facility] 9/11 endorsing worsening SOB and continuous hacking cough with pink sputum. He was admitted with acute decompensated heart failure and started on Lasix drip of 5 and dobutamine 2.5. During his hospitalization there patient underwent several procedures including a CTA for an elevated d-dimer which was negative for PE but showed a small right sided pleural effusion. Patient also had a TTE (9/12/22) and RUCHI (9/13/22) done. TTE showed EF 15-20%, LV that was moderately to severely dilated, LA that was moderately to severely dilated, RV that was moderately dilated, RA that appeared dilated, MV that moderate to severe regurgitation, AV that showed trivial regurgitation, PV with mild to moderate regurgitation, TV w/ moderate regurgitation, Pericardium w/ no effusion, PASP was upper normal, and LVIDd of 7cm. RUCHI revealed severely  depressed LV systolic function, EF of 15-20%, moderate MR after failed mitral valve repair, dilated right chambers. Patient was transferred to [facility] for higher level of care for his decompensated heart failure presenting with  2.5 and Lasix 5 drip.     Of note patient is s/p MVR/MAZE/ablation at [facility] on 6/5/22 and subsequently was admitted to [facility] on 7/17/22 w/ a large pericardial effusion w/ cardiac tamponade and sternal dehiscence. He underwent pericardial window, thoracostomy, and sternal debridement per Dr. Meeks. Left Heart Cath (5/20/22) showed mild to moderate CAD.     At the time of examination patient denied any SOB, chest pain, dizziness, fever, chills, abdominal pain, nausea, vomiting. He states he has been compliant with his medications and diet. This is his first hospitalization for HF since his surgery in June of 2022.     Home medications:   Atenolol 50mg daily (stopped by home cardiologist prior to transferring)  Levothyroxine 100 mcg daily  Liothyronine (T3) 5mcg daily   Aspirin 325mg daily  Lasix 40mg daily  Zyrtec 10 mg daily  Singulair 10mg daily   Amiodarone 400mg bid (started during hospitalization due to NSVT and frequent PVCs)    Of note as per patient, he has taken Coreg (SOB), metoprolol (muscle cramps, depression, visual disturbance), spironolactone (parasthesia, cramps, cough), Bystolic (edema, muscle cramps, wheezing), lisinopril (muscle cramps), and losartan (cough and muscle cramps) in the past and they were discontinued by his Cardiologist after patient reported apparent side effects as seen in parenthesis.  Patient was also on anticoagulation with Eliquis and then Xarelto but was taken off due to cramping and constipation since August 2022.     FMH: Father possible HF 2/2 viral mycarditis. Grandparents (mother) ICM.     Social: Stopped smoking and alcohol 32 yrs ago. Did have 25 yr hx of 1ppd. No drugs. Lives at home with wife of 41 years. 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 Now daughter who lives next door with children takes care of both parents.       * Acute on chronic combined systolic and diastolic heart failure  Mr. Everette Ogden is a 71 y/o M w/ PMH of NICM, HFrEF (EF 15% as of 7/6/22) s/p ICD and pacemaker, severe MR s/p MV repair (6/30/22), paroxysmal AFIB s/p MAZE and left atrial appendage resection (6/30/22), cardiac tamponade s/p pericardial window (7/17/22), bilateral carotid artery stenosis, hypothyroidism and lumbar spinal stenosis admitted for acute on chronic decompensate HF secondary to unknown cause. Possibly due to failed MV repair as noted on RUCHI at [place]. TTE (9/12/22) and RUCHI (9/13/22) done. TTE showed EF 15-20%, LV that was moderately to severely dilated, LA that was moderately to severely dilated, RV that was moderately dilated, RA that appeared dilated, MV that moderate to severe regurgitation, AV that showed trivial regurgitation, PV with mild to moderate regurgitation, TV w/ moderate regurgitation, Pericardium w/ no effusion, PASP was upper normal, and LVIDd of 7cm. Patient noted to have elevated LA of 2.3 and AST/ALT of 296 and 350 respectively. Creatinine 1.2 near baseline of 1.05. States he has been producing good urine.     - Diuresing well. Will decrease lasix gtt.   - Cont  2.5 for now, has tolerated entresto well thus far. Plan to increase entresto tonight and will wean  to off with increased dose..   - GDMT: Cont entresto 24-26(tolerating well, increase to 49-51 tonight) and spironolactone 25mg daily.   - Echo 9/16: EF 20, Mild-Mod MR, Mod-sev TR, LVIdd 7.47, TAPSE 1.46. Will repeat once euvolemic.   - Repeat echo 9/19: EF 15-20%, moderate-severely reduced RVSF, LVEDD 8.2cm.   - Maintain K>4 and Mg>2  - PT consult.    Hypothyroidism  - cont levothyroxine and lithothyronine.       Paroxysmal atrial fibrillation  - On Amiodarone 400mg bid.   - Cont heparin drip. Transition to  apixiban when necessary.      Joan Crowell PA-C  Heart Transplant  [facility] - Cardiology Stepdown

## 2022-09-20 NOTE — PROGRESS NOTES
At the request of Dr. Lam, I have been asked to meet patient and provide VAD education. Introduced self and reason for visit. Pt AAAO.  Provided phase 1 written VAD education. Included in Phase 1 folder is the following:     Evaluation Eval for [facility]  Pictures of examples of VADs     Explained the work up process including possible outcomes.     Explained to look over the entire contents and read Evaluation Eval for [facility] acknowledgement form.  Also explained that they should bring this folder with them to all clinic visits and if they are admitted to the hospital so that we can continue education as needed. Should there be any questions, please write them down and bring with you or feel free to call and we can talk on the phone. All questions answered to patient's satisfaction as evidence by verbal acknowledgement.

## 2022-09-21 LAB
ALBUMIN SERPL BCP-MCNC: 3.7 G/DL (ref 3.5–5.2)
ALBUMIN SERPL BCP-MCNC: 4 G/DL (ref 3.5–5.2)
ALLENS TEST: ABNORMAL
ALP SERPL-CCNC: 94 U/L (ref 55–135)
ALP SERPL-CCNC: 97 U/L (ref 55–135)
ALT SERPL W/O P-5'-P-CCNC: 103 U/L (ref 10–44)
ALT SERPL W/O P-5'-P-CCNC: 126 U/L (ref 10–44)
ANION GAP SERPL CALC-SCNC: 11 MMOL/L (ref 8–16)
ANION GAP SERPL CALC-SCNC: 12 MMOL/L (ref 8–16)
APTT BLDCRRT: 62.4 SEC (ref 21–32)
AST SERPL-CCNC: 34 U/L (ref 10–40)
AST SERPL-CCNC: 39 U/L (ref 10–40)
BASOPHILS # BLD AUTO: 0.05 K/UL (ref 0–0.2)
BASOPHILS NFR BLD: 0.5 % (ref 0–1.9)
BILIRUB SERPL-MCNC: 1.6 MG/DL (ref 0.1–1)
BILIRUB SERPL-MCNC: 2 MG/DL (ref 0.1–1)
BUN SERPL-MCNC: 35 MG/DL (ref 8–23)
BUN SERPL-MCNC: 38 MG/DL (ref 8–23)
CALCIUM SERPL-MCNC: 10 MG/DL (ref 8.7–10.5)
CALCIUM SERPL-MCNC: 9.5 MG/DL (ref 8.7–10.5)
CHLORIDE SERPL-SCNC: 94 MMOL/L (ref 95–110)
CHLORIDE SERPL-SCNC: 96 MMOL/L (ref 95–110)
CO2 SERPL-SCNC: 24 MMOL/L (ref 23–29)
CO2 SERPL-SCNC: 24 MMOL/L (ref 23–29)
CREAT SERPL-MCNC: 1.5 MG/DL (ref 0.5–1.4)
CREAT SERPL-MCNC: 1.9 MG/DL (ref 0.5–1.4)
DELSYS: ABNORMAL
DIFFERENTIAL METHOD: ABNORMAL
EOSINOPHIL # BLD AUTO: 0.2 K/UL (ref 0–0.5)
EOSINOPHIL NFR BLD: 2.6 % (ref 0–8)
ERYTHROCYTE [DISTWIDTH] IN BLOOD BY AUTOMATED COUNT: 18.4 % (ref 11.5–14.5)
EST. GFR  (NO RACE VARIABLE): 36.6 ML/MIN/1.73 M^2
EST. GFR  (NO RACE VARIABLE): 48.6 ML/MIN/1.73 M^2
GLUCOSE SERPL-MCNC: 165 MG/DL (ref 70–110)
GLUCOSE SERPL-MCNC: 91 MG/DL (ref 70–110)
HCO3 UR-SCNC: 27.9 MMOL/L (ref 24–28)
HCT VFR BLD AUTO: 47.6 % (ref 40–54)
HGB BLD-MCNC: 14.6 G/DL (ref 14–18)
IMM GRANULOCYTES # BLD AUTO: 0.04 K/UL (ref 0–0.04)
IMM GRANULOCYTES NFR BLD AUTO: 0.4 % (ref 0–0.5)
LYMPHOCYTES # BLD AUTO: 1.3 K/UL (ref 1–4.8)
LYMPHOCYTES NFR BLD: 14.2 % (ref 18–48)
MAGNESIUM SERPL-MCNC: 2.3 MG/DL (ref 1.6–2.6)
MAGNESIUM SERPL-MCNC: 2.4 MG/DL (ref 1.6–2.6)
MCH RBC QN AUTO: 25.3 PG (ref 27–31)
MCHC RBC AUTO-ENTMCNC: 30.7 G/DL (ref 32–36)
MCV RBC AUTO: 83 FL (ref 82–98)
MONOCYTES # BLD AUTO: 0.9 K/UL (ref 0.3–1)
MONOCYTES NFR BLD: 10.3 % (ref 4–15)
NEUTROPHILS # BLD AUTO: 6.6 K/UL (ref 1.8–7.7)
NEUTROPHILS NFR BLD: 72 % (ref 38–73)
NRBC BLD-RTO: 0 /100 WBC
PCO2 BLDA: 45.1 MMHG (ref 35–45)
PH SMN: 7.4 [PH] (ref 7.35–7.45)
PLATELET # BLD AUTO: 243 K/UL (ref 150–450)
PMV BLD AUTO: 9.5 FL (ref 9.2–12.9)
PO2 BLDA: 31 MMHG (ref 40–60)
POC BE: 3 MMOL/L
POC SATURATED O2: 59 % (ref 95–100)
POC TCO2: 29 MMOL/L (ref 24–29)
POTASSIUM SERPL-SCNC: 4.4 MMOL/L (ref 3.5–5.1)
POTASSIUM SERPL-SCNC: 4.8 MMOL/L (ref 3.5–5.1)
PROT SERPL-MCNC: 7.2 G/DL (ref 6–8.4)
PROT SERPL-MCNC: 7.7 G/DL (ref 6–8.4)
RBC # BLD AUTO: 5.77 M/UL (ref 4.6–6.2)
SAMPLE: ABNORMAL
SITE: ABNORMAL
SODIUM SERPL-SCNC: 129 MMOL/L (ref 136–145)
SODIUM SERPL-SCNC: 132 MMOL/L (ref 136–145)
WBC # BLD AUTO: 9.13 K/UL (ref 3.9–12.7)

## 2022-09-21 PROCEDURE — 25000003 PHARM REV CODE 250: Performed by: INTERNAL MEDICINE

## 2022-09-21 PROCEDURE — 80053 COMPREHEN METABOLIC PANEL: CPT | Mod: 91 | Performed by: STUDENT IN AN ORGANIZED HEALTH CARE EDUCATION/TRAINING PROGRAM

## 2022-09-21 PROCEDURE — 99233 PR SUBSEQUENT HOSPITAL CARE,LEVL III: ICD-10-PCS | Mod: ,,, | Performed by: PHYSICIAN ASSISTANT

## 2022-09-21 PROCEDURE — 99233 SBSQ HOSP IP/OBS HIGH 50: CPT | Mod: ,,, | Performed by: PHYSICIAN ASSISTANT

## 2022-09-21 PROCEDURE — 85025 COMPLETE CBC W/AUTO DIFF WBC: CPT | Performed by: STUDENT IN AN ORGANIZED HEALTH CARE EDUCATION/TRAINING PROGRAM

## 2022-09-21 PROCEDURE — 25000003 PHARM REV CODE 250: Performed by: PHYSICIAN ASSISTANT

## 2022-09-21 PROCEDURE — 85730 THROMBOPLASTIN TIME PARTIAL: CPT | Performed by: STUDENT IN AN ORGANIZED HEALTH CARE EDUCATION/TRAINING PROGRAM

## 2022-09-21 PROCEDURE — 25000003 PHARM REV CODE 250: Performed by: STUDENT IN AN ORGANIZED HEALTH CARE EDUCATION/TRAINING PROGRAM

## 2022-09-21 PROCEDURE — 83735 ASSAY OF MAGNESIUM: CPT | Mod: 91 | Performed by: STUDENT IN AN ORGANIZED HEALTH CARE EDUCATION/TRAINING PROGRAM

## 2022-09-21 PROCEDURE — 20600001 HC STEP DOWN PRIVATE ROOM

## 2022-09-21 PROCEDURE — A4216 STERILE WATER/SALINE, 10 ML: HCPCS | Performed by: INTERNAL MEDICINE

## 2022-09-21 PROCEDURE — 25000003 PHARM REV CODE 250: Performed by: NURSE PRACTITIONER

## 2022-09-21 PROCEDURE — 63600175 PHARM REV CODE 636 W HCPCS: Performed by: STUDENT IN AN ORGANIZED HEALTH CARE EDUCATION/TRAINING PROGRAM

## 2022-09-21 RX ORDER — FUROSEMIDE 40 MG/1
40 TABLET ORAL 2 TIMES DAILY
Status: DISCONTINUED | OUTPATIENT
Start: 2022-09-21 | End: 2022-09-22

## 2022-09-21 RX ADMIN — SPIRONOLACTONE 25 MG: 25 TABLET, FILM COATED ORAL at 09:09

## 2022-09-21 RX ADMIN — LIOTHYRONINE SODIUM 10 MCG: 5 TABLET ORAL at 09:09

## 2022-09-21 RX ADMIN — SACUBITRIL AND VALSARTAN 1 TABLET: 24; 26 TABLET, FILM COATED ORAL at 08:09

## 2022-09-21 RX ADMIN — Medication 10 ML: at 11:09

## 2022-09-21 RX ADMIN — Medication 10 ML: at 12:09

## 2022-09-21 RX ADMIN — LEVOTHYROXINE SODIUM 100 MCG: 100 TABLET ORAL at 05:09

## 2022-09-21 RX ADMIN — Medication 10 ML: at 06:09

## 2022-09-21 RX ADMIN — Medication 10 ML: at 05:09

## 2022-09-21 RX ADMIN — HEPARIN SODIUM 12 UNITS/KG/HR: 5000 INJECTION INTRAVENOUS; SUBCUTANEOUS at 06:09

## 2022-09-21 RX ADMIN — ASPIRIN 325 MG ORAL TABLET 325 MG: 325 PILL ORAL at 09:09

## 2022-09-21 RX ADMIN — AMIODARONE HYDROCHLORIDE 400 MG: 200 TABLET ORAL at 08:09

## 2022-09-21 RX ADMIN — POLYETHYLENE GLYCOL 3350 17 G: 17 POWDER, FOR SOLUTION ORAL at 09:09

## 2022-09-21 RX ADMIN — GUAIFENESIN AND DEXTROMETHORPHAN 5 ML: 100; 10 SYRUP ORAL at 08:09

## 2022-09-21 RX ADMIN — AMIODARONE HYDROCHLORIDE 400 MG: 200 TABLET ORAL at 09:09

## 2022-09-21 RX ADMIN — SENNOSIDES AND DOCUSATE SODIUM 1 TABLET: 50; 8.6 TABLET ORAL at 06:09

## 2022-09-21 RX ADMIN — SACUBITRIL AND VALSARTAN 1 TABLET: 24; 26 TABLET, FILM COATED ORAL at 10:09

## 2022-09-21 RX ADMIN — FUROSEMIDE 40 MG: 40 TABLET ORAL at 06:09

## 2022-09-21 NOTE — PROGRESS NOTES
[facility] - Cardiology Stepdown  Heart Transplant  Progress Note    Patient Name: Artemio Ogden  MRN: [id number]  Admission Date: 9/15/2022  Hospital Length of Stay: 6 days  Attending Physician: Keila Lam MD  Primary Care Provider: Danna Lanza MD  Principal Problem:Acute on chronic combined systolic and diastolic heart failure    Subjective:     Interval History: Weaned off of dobutamine but unfortunately did not get entresto dose last night. Discussed with charge RN and parameters placed. SVO2 stable at 59 off of . Blood pressures this 110s/70s. Will resume low dose entresto, concern for giving increased dose given night time doses have been held per MAR review. Volume status stable, appears euvolemic. Will start po diuretic this evening.      Continuous Infusions:   heparin (porcine) in D5W 12 Units/kg/hr (09/20/22 1834)     Scheduled Meds:   amiodarone  400 mg Oral BID    aspirin  325 mg Oral Daily    furosemide  40 mg Oral BID    levothyroxine  100 mcg Oral Before breakfast    liothyronine  10 mcg Oral Daily    polyethylene glycol  17 g Oral Daily    sacubitriL-valsartan  1 tablet Oral BID    sodium chloride 0.9%  10 mL Intravenous Q6H    spironolactone  25 mg Oral Daily     PRN Meds:acetaminophen, benzonatate, dextromethorphan-guaiFENesin  mg/5 ml, promethazine-codeine 6.25-10 mg/5 ml, senna-docusate 8.6-50 mg, sodium chloride 0.9%, Flushing PICC Protocol **AND** sodium chloride 0.9% **AND** sodium chloride 0.9%    Review of patient's allergies indicates:   Allergen Reactions    Vistaril [hydroxyzine pamoate] Other (See Comments)     Hypotension, dizziness     Objective:     Vital Signs (Most Recent):  Temp: 96.6 °F (35.9 °C) (09/21/22 1507)  Pulse: 85 (09/21/22 1512)  Resp: 17 (09/21/22 1507)  BP: 110/66 (09/21/22 1507)  SpO2: (!) 92 % (09/21/22 1507) Vital Signs (24h Range):  Temp:  [96.5 °F (35.8 °C)-98 °F (36.7 °C)] 96.6 °F (35.9 °C)  Pulse:  [85-95] 85  Resp:  [16-18]  17  SpO2:  [92 %-96 %] 92 %  BP: ()/(1-74) 110/66     Patient Vitals for the past 72 hrs (Last 3 readings):   Weight   09/21/22 0437 87.3 kg (192 lb 7.4 oz)   09/20/22 0500 86.5 kg (190 lb 11.2 oz)   09/19/22 1424 89.4 kg (197 lb)       Body mass index is 25.39 kg/m².      Intake/Output Summary (Last 24 hours) at 9/21/2022 1717  Last data filed at 9/21/2022 1000  Gross per 24 hour   Intake 540 ml   Output 1050 ml   Net -510 ml            Telemetry: Vpcd with PVCs    Physical Exam  Vitals and nursing note reviewed.   Constitutional:       Appearance: He is well-developed.   HENT:      Head: Normocephalic.   Eyes:      Pupils: Pupils are equal, round, and reactive to light.   Neck:      Comments: JVP right above clavicle  Cardiovascular:      Rate and Rhythm: Normal rate and regular rhythm.      Heart sounds: Murmur heard.   Pulmonary:      Effort: Pulmonary effort is normal.      Breath sounds: Normal breath sounds.   Abdominal:      General: Bowel sounds are normal.      Palpations: Abdomen is soft.   Genitourinary:     Rectum: Guaiac stool: \.   Musculoskeletal:         General: Normal range of motion.      Cervical back: Normal range of motion and neck supple.   Skin:     General: Skin is warm and dry.   Neurological:      Mental Status: He is alert and oriented to person, place, and time.   Psychiatric:         Behavior: Behavior normal.       Significant Labs:  CBC:  Recent Labs   Lab 09/19/22  0502 09/20/22  0437 09/21/22  0448   WBC 7.07 8.06 9.13   RBC 5.39 5.65 5.77   HGB 13.7* 14.3 14.6   HCT 43.3 45.2 47.6    228 243   MCV 80* 80* 83   MCH 25.4* 25.3* 25.3*   MCHC 31.6* 31.6* 30.7*       BNP:  No results for input(s): BNP in the last 168 hours.    Invalid input(s): BNPTRIAGELBLO  CMP:  Recent Labs   Lab 09/20/22  0437 09/20/22  1635 09/21/22  0448   GLU 99 102 91   CALCIUM 10.1 10.0 10.0   ALBUMIN 4.1 4.1 4.0   PROT 7.8 8.0 7.7   * 133* 132*   K 4.1 4.7 4.8   CO2 26 25 24   CL 96 95 96    BUN 35* 37* 35*   CREATININE 1.6* 1.7* 1.5*   ALKPHOS 97 100 97   * 152* 126*   AST 59* 50* 39   BILITOT 2.2* 2.1* 2.0*        Coagulation:   Recent Labs   Lab 09/19/22  0502 09/20/22  0437 09/21/22  0448   APTT 45.3* 55.1* 62.4*       LDH:  No results for input(s): LDH in the last 72 hours.  Microbiology:  Microbiology Results (last 7 days)       ** No results found for the last 168 hours. **          I have reviewed all pertinent labs within the past 24 hours.    Estimated Creatinine Clearance: 48.8 mL/min (A) (based on SCr of 1.5 mg/dL (H)).    Diagnostic Results:  I have reviewed all pertinent imaging results/findings within the past 24 hours.    Assessment and Plan:     Mr. Everette Ogden is a 71 y/o M w/ PMH of NICM, HFrEF (EF 15% as of 7/6/22) s/p ICD and pacemaker, severe MR s/p MV repair (6/30/22), paroxysmal AFIB s/p MAZE and left atrial appendage resection (6/30/22), cardiac tamponade s/p pericardial window (7/17/22), bilateral carotid artery stenosis, hypothyroidism and lumbar spinal stenosis who [place] endorsing worsening SOB and continuous hacking cough with pink sputum. He was admitted with acute decompensated heart failure and started on Lasix drip of 5 and dobutamine 2.5. During his hospitalization there patient underwent several procedures including a CTA for an elevated d-dimer which was negative for PE but showed a small right sided pleural effusion. Patient also had a TTE (9/12/22) and RUCHI (9/13/22) done. TTE showed EF 15-20%, LV that was moderately to severely dilated, LA that was moderately to severely dilated, RV that was moderately dilated, RA that appeared dilated, MV that moderate to severe regurgitation, AV that showed trivial regurgitation, PV with mild to moderate regurgitation, TV w/ moderate regurgitation, Pericardium w/ no effusion, PASP was upper normal, and LVIDd of 7cm. RUCHI revealed severely depressed LV systolic  function, EF of 15-20%, moderate MR after failed mitral valve repair, dilated right chambers. Patient was transferred to [facility] for higher level of care for his decompensated heart failure presenting with  2.5 and Lasix 5 drip.     Of note patient is s/p MVR/MAZE/ablation at [facility] on 6/5/22 and subsequently was admitted to [facility] on 7/17/22 w/ a large pericardial effusion w/ cardiac tamponade and sternal dehiscence. He underwent pericardial window, thoracostomy, and sternal debridement per Dr. Meeks. Left Heart Cath (5/20/22) showed mild to moderate CAD.     At the time of examination patient denied any SOB, chest pain, dizziness, fever, chills, abdominal pain, nausea, vomiting. He states he has been compliant with his medications and diet. This is his first hospitalization for HF since his surgery in June of 2022.     Home medications:   Atenolol 50mg daily (stopped by home cardiologist prior to transferring)  Levothyroxine 100 mcg daily  Liothyronine (T3) 5mcg daily   Aspirin 325mg daily  Lasix 40mg daily  Zyrtec 10 mg daily  Singulair 10mg daily   Amiodarone 400mg bid (started during hospitalization due to NSVT and frequent PVCs)    Of note as per patient, he has taken Coreg (SOB), metoprolol (muscle cramps, depression, visual disturbance), spironolactone (parasthesia, cramps, cough), Bystolic (edema, muscle cramps, wheezing), lisinopril (muscle cramps), and losartan (cough and muscle cramps) in the past and they were discontinued by his Cardiologist after patient reported apparent side effects as seen in parenthesis.  Patient was also on anticoagulation with Eliquis and then Xarelto but was taken off due to cramping and constipation since August 2022.     FMH: Father possible HF 2/2 viral mycarditis. Grandparents (mother) ICM.     Social: Stopped smoking and alcohol 32 yrs ago. Did have 25 yr hx of 1ppd. No drugs. Lives at home with wife of 41 years. 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 Now daughter who lives next door with children takes care of both parents.       * Acute on chronic combined systolic and diastolic heart failure  Mr. Everette Ogden is a 73 y/o M w/ PMH of NICM, HFrEF (EF 15% as of 7/6/22) s/p ICD and pacemaker, severe MR s/p MV repair (6/30/22), paroxysmal AFIB s/p MAZE and left atrial appendage resection (6/30/22), cardiac tamponade s/p pericardial window (7/17/22), bilateral carotid artery stenosis, hypothyroidism and lumbar spinal stenosis admitted for acute on chronic decompensate HF secondary to unknown cause. Possibly due to failed MV repair as noted on RUCHI at [place]. TTE (9/12/22) and RUCHI (9/13/22) done. TTE showed EF 15-20%, LV that was moderately to severely dilated, LA that was moderately to severely dilated, RV that was moderately dilated, RA that appeared dilated, MV that moderate to severe regurgitation, AV that showed trivial regurgitation, PV with mild to moderate regurgitation, TV w/ moderate regurgitation, Pericardium w/ no effusion, PASP was upper normal, and LVIDd of 7cm. Patient noted to have elevated LA of 2.3 and AST/ALT of 296 and 350 respectively. Creatinine 1.2 near baseline of 1.05. States he has been producing good urine.     - Diuresed well on lasix gtt, d/c'd gtt in light of euvolemia/bump in sCr. Transitioned to po lasix today.  - weaned off of  with stable HDs. Entresto dose increased however med held by RN. Cotninue low dose entresto for now.   - GDMT: Cont entresto 24-26 and spironolactone 25mg daily.   - Echo 9/16: EF 20, Mild-Mod MR, Mod-sev TR, LVIdd 7.47, TAPSE 1.46.    - Repeat echo 9/19: EF 15-20%, moderate-severely reduced RVSF, LVEDD 8.2cm.   - Maintain K>4 and Mg>2  - PT consult.    Hypothyroidism  - cont levothyroxine and lithothyronine.       Paroxysmal atrial fibrillation  - On Amiodarone 400mg bid.   - Cont heparin drip. Transition to apixiban when necessary.      Joan Crowell,  FABIO  Heart Transplant  [facility] - Cardiology Stepdown

## 2022-09-21 NOTE — PROGRESS NOTES
Update    Pt presents as AAO x4, calm, cooperative, and asking and answering questions appropriately, caregivers not present. Pt states he is doing well today. Pt states he had some anxiety yesterday after learning about the LVAD. Pt states he has good support and this his sister from [facility] has been wanting to come see him. LCSW and Pt dicussed cognitive re frames and calming techniques. Pt denies any anxiety today. Pt states he doesn't have any additional needs or concerns at this time. LCSW offered support and encouragement. SW providing ongoing psychosocial, counseling, & emotional support, education, resources, assistance, and discharge planning as indicated.  SW to continue to follow.

## 2022-09-21 NOTE — ASSESSMENT & PLAN NOTE
Mr. Everette Ogden is a 73 y/o M w/ PMH of NICM, HFrEF (EF 15% as of 7/6/22) s/p ICD and pacemaker, severe MR s/p MV repair (6/30/22), paroxysmal AFIB s/p MAZE and left atrial appendage resection (6/30/22), cardiac tamponade s/p pericardial window (7/17/22), bilateral carotid artery stenosis, hypothyroidism and lumbar spinal stenosis admitted for acute on chronic decompensate HF secondary to unknown cause. Possibly due to failed MV repair as noted on RUCHI at [place]. TTE (9/12/22) and RUCHI (9/13/22) done. TTE showed EF 15-20%, LV that was moderately to severely dilated, LA that was moderately to severely dilated, RV that was moderately dilated, RA that appeared dilated, MV that moderate to severe regurgitation, AV that showed trivial regurgitation, PV with mild to moderate regurgitation, TV w/ moderate regurgitation, Pericardium w/ no effusion, PASP was upper normal, and LVIDd of 7cm. Patient noted to have elevated LA of 2.3 and AST/ALT of 296 and 350 respectively. Creatinine 1.2 near baseline of 1.05. States he has been producing good urine.     - Diuresed well on lasix gtt, d/c'd gtt in light of euvolemia/bump in sCr. Transitioned to po lasix today.  - weaned off of  with stable HDs. Entresto dose increased however med held by RN. Cotninue low dose entresto for now.   - GDMT: Cont entresto 24-26 and spironolactone 25mg daily.   - Echo 9/16: EF 20, Mild-Mod MR, Mod-sev TR, LVIdd 7.47, TAPSE 1.46.    - Repeat echo 9/19: EF 15-20%, moderate-severely reduced RVSF, LVEDD 8.2cm.   - Maintain K>4 and Mg>2  - PT consult.

## 2022-09-21 NOTE — SUBJECTIVE & OBJECTIVE
Interval History: Weaned off of dobutamine but unfortunately did not get entresto dose last night. Discussed with charge RN and parameters placed. SVO2 stable at 59 off of . Blood pressures this 110s/70s. Will resume low dose entresto, concern for giving increased dose given night time doses have been held per MAR review. Volume status stable, appears euvolemic. Will start po diuretic this evening.      Continuous Infusions:   heparin (porcine) in D5W 12 Units/kg/hr (09/20/22 1834)     Scheduled Meds:   amiodarone  400 mg Oral BID    aspirin  325 mg Oral Daily    furosemide  40 mg Oral BID    levothyroxine  100 mcg Oral Before breakfast    liothyronine  10 mcg Oral Daily    polyethylene glycol  17 g Oral Daily    sacubitriL-valsartan  1 tablet Oral BID    sodium chloride 0.9%  10 mL Intravenous Q6H    spironolactone  25 mg Oral Daily     PRN Meds:acetaminophen, benzonatate, dextromethorphan-guaiFENesin  mg/5 ml, promethazine-codeine 6.25-10 mg/5 ml, senna-docusate 8.6-50 mg, sodium chloride 0.9%, Flushing PICC Protocol **AND** sodium chloride 0.9% **AND** sodium chloride 0.9%    Review of patient's allergies indicates:   Allergen Reactions    Vistaril [hydroxyzine pamoate] Other (See Comments)     Hypotension, dizziness     Objective:     Vital Signs (Most Recent):  Temp: 96.6 °F (35.9 °C) (09/21/22 1507)  Pulse: 85 (09/21/22 1512)  Resp: 17 (09/21/22 1507)  BP: 110/66 (09/21/22 1507)  SpO2: (!) 92 % (09/21/22 1507) Vital Signs (24h Range):  Temp:  [96.5 °F (35.8 °C)-98 °F (36.7 °C)] 96.6 °F (35.9 °C)  Pulse:  [85-95] 85  Resp:  [16-18] 17  SpO2:  [92 %-96 %] 92 %  BP: ()/(1-74) 110/66     Patient Vitals for the past 72 hrs (Last 3 readings):   Weight   09/21/22 0437 87.3 kg (192 lb 7.4 oz)   09/20/22 0500 86.5 kg (190 lb 11.2 oz)   09/19/22 1424 89.4 kg (197 lb)       Body mass index is 25.39 kg/m².      Intake/Output Summary (Last 24 hours) at 9/21/2022 1717  Last data filed at 9/21/2022 1000  Gross  per 24 hour   Intake 540 ml   Output 1050 ml   Net -510 ml            Telemetry: Vpcd with PVCs    Physical Exam  Vitals and nursing note reviewed.   Constitutional:       Appearance: He is well-developed.   HENT:      Head: Normocephalic.   Eyes:      Pupils: Pupils are equal, round, and reactive to light.   Neck:      Comments: JVP right above clavicle  Cardiovascular:      Rate and Rhythm: Normal rate and regular rhythm.      Heart sounds: Murmur heard.   Pulmonary:      Effort: Pulmonary effort is normal.      Breath sounds: Normal breath sounds.   Abdominal:      General: Bowel sounds are normal.      Palpations: Abdomen is soft.   Genitourinary:     Rectum: Guaiac stool: \.   Musculoskeletal:         General: Normal range of motion.      Cervical back: Normal range of motion and neck supple.   Skin:     General: Skin is warm and dry.   Neurological:      Mental Status: He is alert and oriented to person, place, and time.   Psychiatric:         Behavior: Behavior normal.       Significant Labs:  CBC:  Recent Labs   Lab 09/19/22  0502 09/20/22 0437 09/21/22 0448   WBC 7.07 8.06 9.13   RBC 5.39 5.65 5.77   HGB 13.7* 14.3 14.6   HCT 43.3 45.2 47.6    228 243   MCV 80* 80* 83   MCH 25.4* 25.3* 25.3*   MCHC 31.6* 31.6* 30.7*       BNP:  No results for input(s): BNP in the last 168 hours.    Invalid input(s): BNPTRIAGELBLO  CMP:  Recent Labs   Lab 09/20/22  0437 09/20/22  1635 09/21/22  0448   GLU 99 102 91   CALCIUM 10.1 10.0 10.0   ALBUMIN 4.1 4.1 4.0   PROT 7.8 8.0 7.7   * 133* 132*   K 4.1 4.7 4.8   CO2 26 25 24   CL 96 95 96   BUN 35* 37* 35*   CREATININE 1.6* 1.7* 1.5*   ALKPHOS 97 100 97   * 152* 126*   AST 59* 50* 39   BILITOT 2.2* 2.1* 2.0*        Coagulation:   Recent Labs   Lab 09/19/22  0502 09/20/22  0437 09/21/22  0448   APTT 45.3* 55.1* 62.4*       LDH:  No results for input(s): LDH in the last 72 hours.  Microbiology:  Microbiology Results (last 7 days)       ** No results found  for the last 168 hours. **          I have reviewed all pertinent labs within the past 24 hours.    Estimated Creatinine Clearance: 48.8 mL/min (A) (based on SCr of 1.5 mg/dL (H)).    Diagnostic Results:  I have reviewed all pertinent imaging results/findings within the past 24 hours.

## 2022-09-22 LAB
ALBUMIN SERPL BCP-MCNC: 3.6 G/DL (ref 3.5–5.2)
ALBUMIN SERPL BCP-MCNC: 3.8 G/DL (ref 3.5–5.2)
ALP SERPL-CCNC: 106 U/L (ref 55–135)
ALP SERPL-CCNC: 97 U/L (ref 55–135)
ALT SERPL W/O P-5'-P-CCNC: 82 U/L (ref 10–44)
ALT SERPL W/O P-5'-P-CCNC: 95 U/L (ref 10–44)
ANION GAP SERPL CALC-SCNC: 10 MMOL/L (ref 8–16)
ANION GAP SERPL CALC-SCNC: 10 MMOL/L (ref 8–16)
APTT BLDCRRT: 62.3 SEC (ref 21–32)
AST SERPL-CCNC: 32 U/L (ref 10–40)
AST SERPL-CCNC: 36 U/L (ref 10–40)
BASOPHILS # BLD AUTO: 0.05 K/UL (ref 0–0.2)
BASOPHILS NFR BLD: 0.6 % (ref 0–1.9)
BILIRUB SERPL-MCNC: 1.3 MG/DL (ref 0.1–1)
BILIRUB SERPL-MCNC: 1.9 MG/DL (ref 0.1–1)
BUN SERPL-MCNC: 33 MG/DL (ref 8–23)
BUN SERPL-MCNC: 36 MG/DL (ref 8–23)
CALCIUM SERPL-MCNC: 9.5 MG/DL (ref 8.7–10.5)
CALCIUM SERPL-MCNC: 9.6 MG/DL (ref 8.7–10.5)
CHLORIDE SERPL-SCNC: 96 MMOL/L (ref 95–110)
CHLORIDE SERPL-SCNC: 97 MMOL/L (ref 95–110)
CO2 SERPL-SCNC: 24 MMOL/L (ref 23–29)
CO2 SERPL-SCNC: 25 MMOL/L (ref 23–29)
CREAT SERPL-MCNC: 1.5 MG/DL (ref 0.5–1.4)
CREAT SERPL-MCNC: 1.5 MG/DL (ref 0.5–1.4)
DIFFERENTIAL METHOD: ABNORMAL
EOSINOPHIL # BLD AUTO: 0.2 K/UL (ref 0–0.5)
EOSINOPHIL NFR BLD: 2.6 % (ref 0–8)
ERYTHROCYTE [DISTWIDTH] IN BLOOD BY AUTOMATED COUNT: 18.4 % (ref 11.5–14.5)
EST. GFR  (NO RACE VARIABLE): 48.6 ML/MIN/1.73 M^2
EST. GFR  (NO RACE VARIABLE): 48.6 ML/MIN/1.73 M^2
GLUCOSE SERPL-MCNC: 89 MG/DL (ref 70–110)
GLUCOSE SERPL-MCNC: 94 MG/DL (ref 70–110)
HCT VFR BLD AUTO: 46.5 % (ref 40–54)
HGB BLD-MCNC: 14.6 G/DL (ref 14–18)
IMM GRANULOCYTES # BLD AUTO: 0.04 K/UL (ref 0–0.04)
IMM GRANULOCYTES NFR BLD AUTO: 0.5 % (ref 0–0.5)
LYMPHOCYTES # BLD AUTO: 1.3 K/UL (ref 1–4.8)
LYMPHOCYTES NFR BLD: 15.2 % (ref 18–48)
MAGNESIUM SERPL-MCNC: 2.3 MG/DL (ref 1.6–2.6)
MAGNESIUM SERPL-MCNC: 2.4 MG/DL (ref 1.6–2.6)
MCH RBC QN AUTO: 25.5 PG (ref 27–31)
MCHC RBC AUTO-ENTMCNC: 31.4 G/DL (ref 32–36)
MCV RBC AUTO: 81 FL (ref 82–98)
MONOCYTES # BLD AUTO: 0.9 K/UL (ref 0.3–1)
MONOCYTES NFR BLD: 10.8 % (ref 4–15)
NEUTROPHILS # BLD AUTO: 6 K/UL (ref 1.8–7.7)
NEUTROPHILS NFR BLD: 70.3 % (ref 38–73)
NRBC BLD-RTO: 0 /100 WBC
PLATELET # BLD AUTO: 253 K/UL (ref 150–450)
PMV BLD AUTO: 9.7 FL (ref 9.2–12.9)
POTASSIUM SERPL-SCNC: 4.4 MMOL/L (ref 3.5–5.1)
POTASSIUM SERPL-SCNC: 4.9 MMOL/L (ref 3.5–5.1)
PROT SERPL-MCNC: 7.3 G/DL (ref 6–8.4)
PROT SERPL-MCNC: 7.5 G/DL (ref 6–8.4)
RBC # BLD AUTO: 5.72 M/UL (ref 4.6–6.2)
SODIUM SERPL-SCNC: 131 MMOL/L (ref 136–145)
SODIUM SERPL-SCNC: 131 MMOL/L (ref 136–145)
WBC # BLD AUTO: 8.55 K/UL (ref 3.9–12.7)

## 2022-09-22 PROCEDURE — A4216 STERILE WATER/SALINE, 10 ML: HCPCS | Performed by: INTERNAL MEDICINE

## 2022-09-22 PROCEDURE — 83735 ASSAY OF MAGNESIUM: CPT | Performed by: STUDENT IN AN ORGANIZED HEALTH CARE EDUCATION/TRAINING PROGRAM

## 2022-09-22 PROCEDURE — 20600001 HC STEP DOWN PRIVATE ROOM

## 2022-09-22 PROCEDURE — 25000003 PHARM REV CODE 250: Performed by: PHYSICIAN ASSISTANT

## 2022-09-22 PROCEDURE — 25000003 PHARM REV CODE 250: Performed by: STUDENT IN AN ORGANIZED HEALTH CARE EDUCATION/TRAINING PROGRAM

## 2022-09-22 PROCEDURE — 94761 N-INVAS EAR/PLS OXIMETRY MLT: CPT

## 2022-09-22 PROCEDURE — 85730 THROMBOPLASTIN TIME PARTIAL: CPT | Performed by: STUDENT IN AN ORGANIZED HEALTH CARE EDUCATION/TRAINING PROGRAM

## 2022-09-22 PROCEDURE — 85025 COMPLETE CBC W/AUTO DIFF WBC: CPT | Performed by: STUDENT IN AN ORGANIZED HEALTH CARE EDUCATION/TRAINING PROGRAM

## 2022-09-22 PROCEDURE — 99233 PR SUBSEQUENT HOSPITAL CARE,LEVL III: ICD-10-PCS | Mod: GC,,, | Performed by: NURSE PRACTITIONER

## 2022-09-22 PROCEDURE — 25000003 PHARM REV CODE 250: Performed by: NURSE PRACTITIONER

## 2022-09-22 PROCEDURE — 99233 SBSQ HOSP IP/OBS HIGH 50: CPT | Mod: GC,,, | Performed by: NURSE PRACTITIONER

## 2022-09-22 PROCEDURE — 25000003 PHARM REV CODE 250: Performed by: INTERNAL MEDICINE

## 2022-09-22 PROCEDURE — 80053 COMPREHEN METABOLIC PANEL: CPT | Mod: 91 | Performed by: STUDENT IN AN ORGANIZED HEALTH CARE EDUCATION/TRAINING PROGRAM

## 2022-09-22 RX ADMIN — AMIODARONE HYDROCHLORIDE 400 MG: 200 TABLET ORAL at 09:09

## 2022-09-22 RX ADMIN — POLYETHYLENE GLYCOL 3350 17 G: 17 POWDER, FOR SOLUTION ORAL at 08:09

## 2022-09-22 RX ADMIN — SPIRONOLACTONE 25 MG: 25 TABLET, FILM COATED ORAL at 08:09

## 2022-09-22 RX ADMIN — ASPIRIN 325 MG ORAL TABLET 325 MG: 325 PILL ORAL at 08:09

## 2022-09-22 RX ADMIN — SACUBITRIL AND VALSARTAN 1 TABLET: 24; 26 TABLET, FILM COATED ORAL at 08:09

## 2022-09-22 RX ADMIN — SACUBITRIL AND VALSARTAN 1 TABLET: 24; 26 TABLET, FILM COATED ORAL at 09:09

## 2022-09-22 RX ADMIN — FUROSEMIDE 60 MG: 40 TABLET ORAL at 08:09

## 2022-09-22 RX ADMIN — Medication 10 ML: at 12:09

## 2022-09-22 RX ADMIN — LIOTHYRONINE SODIUM 10 MCG: 5 TABLET ORAL at 08:09

## 2022-09-22 RX ADMIN — APIXABAN 5 MG: 5 TABLET, FILM COATED ORAL at 09:09

## 2022-09-22 RX ADMIN — AMIODARONE HYDROCHLORIDE 400 MG: 200 TABLET ORAL at 08:09

## 2022-09-22 RX ADMIN — Medication 10 ML: at 05:09

## 2022-09-22 RX ADMIN — FUROSEMIDE 60 MG: 40 TABLET ORAL at 05:09

## 2022-09-22 RX ADMIN — LEVOTHYROXINE SODIUM 100 MCG: 100 TABLET ORAL at 04:09

## 2022-09-22 NOTE — ASSESSMENT & PLAN NOTE
Mr. Everette Ogden is a 73 y/o M w/ PMH of NICM, HFrEF (EF 15% as of 7/6/22) s/p ICD and pacemaker, severe MR s/p MV repair (6/30/22), paroxysmal AFIB s/p MAZE and left atrial appendage resection (6/30/22), cardiac tamponade s/p pericardial window (7/17/22), bilateral carotid artery stenosis, hypothyroidism and lumbar spinal stenosis admitted for acute on chronic decompensate HF secondary to unknown cause. Possibly due to failed MV repair as noted on RUCHI at [place]. TTE (9/12/22) and RUCHI (9/13/22) done. TTE showed EF 15-20%, LV that was moderately to severely dilated, LA that was moderately to severely dilated, RV that was moderately dilated, RA that appeared dilated, MV that moderate to severe regurgitation, AV that showed trivial regurgitation, PV with mild to moderate regurgitation, TV w/ moderate regurgitation, Pericardium w/ no effusion, PASP was upper normal, and LVIDd of 7cm. Patient noted to have elevated LA of 2.3 and AST/ALT of 296 and 350 respectively. Creatinine 1.2 near baseline of 1.05. States he has been producing good urine.     - Weaned off of  with stable HDs.    - GDMT: Cont entresto 24-26 and spironolactone 25mg daily.   - Transitioned to PO diuretic.   - Echo 9/16: EF 20, Mild-Mod MR, Mod-sev TR, LVIdd 7.47, TAPSE 1.46.    - Repeat echo 9/19: EF 15-20%, moderate-severely reduced RVSF, LVEDD 8.2cm.   - Maintain K>4 and Mg>2  - Pt given VAD book to review at home.

## 2022-09-22 NOTE — NURSING
Patient awake at this time. NAD noted. Medications administered per MAR. Heparin gtt infusing at this time. Patient remains free from falls, injury, and harm. PICC and telemetry intact. Labs drawn. CVP is 10. Patient weighed. Bed locked in lowest position. Call bell and personal items within reach. Will continue POC

## 2022-09-22 NOTE — PROGRESS NOTES
[facility] - [facility]iology Stepdown  Heart Transplant  Progress Note  Patient Name: Artemio Ogden  MRN: [id number]  Admission Date: 9/15/2022  Hospital Length of Stay: 7 days  Attending Physician: Keila Lam MD  Primary Care Provider: Danna Lanza MD  Principal Problem:Acute on chronic combined systolic and diastolic heart failure    Subjective:     Interval History: No acute events overnight. Feeling overall well this am. Slight dizziness but overall tolerable.     Continuous Infusions:   heparin (porcine) in D5W 12 Units/kg/hr (09/21/22 1804)     Scheduled Meds:   amiodarone  400 mg Oral BID    aspirin  325 mg Oral Daily    furosemide  60 mg Oral BID    levothyroxine  100 mcg Oral Before breakfast    liothyronine  10 mcg Oral Daily    polyethylene glycol  17 g Oral Daily    sacubitriL-valsartan  1 tablet Oral BID    sodium chloride 0.9%  10 mL Intravenous Q6H    spironolactone  25 mg Oral Daily     PRN Meds:acetaminophen, benzonatate, dextromethorphan-guaiFENesin  mg/5 ml, promethazine-codeine 6.25-10 mg/5 ml, senna-docusate 8.6-50 mg, sodium chloride 0.9%, Flushing PICC Protocol **AND** sodium chloride 0.9% **AND** sodium chloride 0.9%    Review of patient's allergies indicates:   Allergen Reactions    Vistaril [hydroxyzine pamoate] Other (See Comments)     Hypotension, dizziness     Objective:     Vital Signs (Most Recent):  Temp: 97.7 °F (36.5 °C) (09/22/22 0731)  Pulse: 85 (09/22/22 0749)  Resp: 18 (09/22/22 0731)  BP: 106/75 (09/22/22 0731)  SpO2: (!) 94 % (09/22/22 0731) Vital Signs (24h Range):  Temp:  [96.6 °F (35.9 °C)-97.7 °F (36.5 °C)] 97.7 °F (36.5 °C)  Pulse:  [83-92] 85  Resp:  [16-18] 18  SpO2:  [92 %-96 %] 94 %  BP: ()/(53-75) 106/75     Patient Vitals for the past 72 hrs (Last 3 readings):   Weight   09/22/22 0406 87.1 kg (192 lb 0.3 oz)   09/21/22 0437 87.3 kg (192 lb 7.4 oz)   09/20/22 0500 86.5 kg (190 lb 11.2 oz)       Body mass index is 25.33  kg/m².      Intake/Output Summary (Last 24 hours) at 9/22/2022 1050  Last data filed at 9/22/2022 0800  Gross per 24 hour   Intake --   Output 1000 ml   Net -1000 ml       CVP:  [10 mmHg] 10 mmHg    Telemetry: Vpcd with PVCs    Physical Exam  Vitals and nursing note reviewed.   Constitutional:       Appearance: He is well-developed.   HENT:      Head: Normocephalic.   Eyes:      Pupils: Pupils are equal, round, and reactive to light.   Cardiovascular:      Rate and Rhythm: Normal rate and regular rhythm.      Heart sounds: Murmur heard.   Pulmonary:      Effort: Pulmonary effort is normal.      Breath sounds: Normal breath sounds.   Abdominal:      General: Bowel sounds are normal.      Palpations: Abdomen is soft.   Genitourinary:     Rectum: Guaiac stool: \.   Musculoskeletal:         General: Normal range of motion.      Cervical back: Normal range of motion and neck supple.   Skin:     General: Skin is warm and dry.   Neurological:      Mental Status: He is alert and oriented to person, place, and time.   Psychiatric:         Behavior: Behavior normal.       Significant Labs:  CBC:  Recent Labs   Lab 09/20/22  0437 09/21/22  0448 09/22/22  0421   WBC 8.06 9.13 8.55   RBC 5.65 5.77 5.72   HGB 14.3 14.6 14.6   HCT 45.2 47.6 46.5    243 253   MCV 80* 83 81*   MCH 25.3* 25.3* 25.5*   MCHC 31.6* 30.7* 31.4*       BNP:  No results for input(s): BNP in the last 168 hours.    Invalid input(s): BNPTRIAGELBLO  CMP:  Recent Labs   Lab 09/21/22  0448 09/21/22  1814 09/22/22  0421   GLU 91 165* 94   CALCIUM 10.0 9.5 9.5   ALBUMIN 4.0 3.7 3.8   PROT 7.7 7.2 7.5   * 129* 131*   K 4.8 4.4 4.4   CO2 24 24 25   CL 96 94* 96   BUN 35* 38* 33*   CREATININE 1.5* 1.9* 1.5*   ALKPHOS 97 94 97   * 103* 95*   AST 39 34 36   BILITOT 2.0* 1.6* 1.9*        Coagulation:   Recent Labs   Lab 09/20/22  0437 09/21/22  0448 09/22/22  0421   APTT 55.1* 62.4* 62.3*       LDH:  No results for input(s): LDH in the last 72  hours.  Microbiology:  Microbiology Results (last 7 days)       ** No results found for the last 168 hours. **          I have reviewed all pertinent labs within the past 24 hours.    Estimated Creatinine Clearance: 48.8 mL/min (A) (based on SCr of 1.5 mg/dL (H)).    Diagnostic Results:  I have reviewed all pertinent imaging results/findings within the past 24 hours.    Assessment and Plan:     Mr. Everette Ogden is a 73 y/o M w/ PMH of NICM, HFrEF (EF 15% as of 7/6/22) s/p ICD and pacemaker, severe MR s/p MV repair (6/30/22), paroxysmal AFIB s/p MAZE and left atrial appendage resection (6/30/22), cardiac tamponade s/p pericardial window (7/17/22), bilateral carotid artery stenosis, hypothyroidism and lumbar spinal stenosis who initially presented to [facility] on 9/11 endorsing worsening SOB and continuous hacking cough with pink sputum. He was admitted with acute decompensated heart failure and started on Lasix drip of 5 and dobutamine 2.5. During his hospitalization there patient underwent several procedures including a CTA for an elevated d-dimer which was negative for PE but showed a small right sided pleural effusion. Patient also had a TTE (9/12/22) and RUCHI (9/13/22) done. TTE showed EF 15-20%, LV that was moderately to severely dilated, LA that was moderately to severely dilated, RV that was moderately dilated, RA that appeared dilated, MV that moderate to severe regurgitation, AV that showed trivial regurgitation, PV with mild to moderate regurgitation, TV w/ moderate regurgitation, Pericardium w/ no effusion, PASP was upper normal, and LVIDd of 7cm. RUCHI revealed severely depressed LV systolic function, EF of 15-20%, moderate MR after failed mitral valve repair, dilated right chambers. Patient was transferred to [facility] for higher level of care for his decompensated heart failure presenting with  2.5 and Lasix 5 drip.     Of note patient is s/p MVR/MAZE/ablation at [facility] on 6/5/22  and subsequently was admitted to [facility] on 7/17/22 w/ a large pericardial effusion w/ cardiac tamponade and sternal dehiscence. He underwent pericardial window, thoracostomy, and sternal debridement per Dr. Meeks. Left Heart Cath (5/20/22) showed mild to moderate CAD.     At the time of examination patient denied any SOB, chest pain, dizziness, fever, chills, abdominal pain, nausea, vomiting. He states he has been compliant with his medications and diet. This is his first hospitalization for HF since his surgery in June of 2022.     Home medications:   Atenolol 50mg daily (stopped by home cardiologist prior to transferring)  Levothyroxine 100 mcg daily  Liothyronine (T3) 5mcg daily   Aspirin 325mg daily  Lasix 40mg daily  Zyrtec 10 mg daily  Singulair 10mg daily   Amiodarone 400mg bid (started during hospitalization due to NSVT and frequent PVCs)    Of note as per patient, he has taken Coreg (SOB), metoprolol (muscle cramps, depression, visual disturbance), spironolactone (parasthesia, cramps, cough), Bystolic (edema, muscle cramps, wheezing), lisinopril (muscle cramps), and losartan (cough and muscle cramps) in the past and they were discontinued by his Cardiologist after patient reported apparent side effects as seen in parenthesis.  Patient was also on anticoagulation with Eliquis and then Xarelto but was taken off due to cramping and constipation since August 2022.     FMH: Father possible HF 2/2 viral mycarditis. Grandparents (mother) ICM.     Social: Stopped smoking and alcohol 32 yrs ago. Did have 25 yr hx of 1ppd. No drugs. Lives at home with wife of 41 years. 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 Now daughter who lives next door with children takes care of both parents.       * Acute on chronic combined systolic and diastolic heart failure  Mr. Everette Ogden is a 71 y/o M w/ PMH of NICM, HFrEF (EF 15% as of 7/6/22) s/p ICD and pacemaker,  severe MR s/p MV repair (6/30/22), paroxysmal AFIB s/p MAZE and left atrial appendage resection (6/30/22), cardiac tamponade s/p pericardial window (7/17/22), bilateral carotid artery stenosis, hypothyroidism and lumbar spinal stenosis admitted for acute on chronic decompensate HF secondary to unknown cause. Possibly due to failed MV repair as noted on RUCHI at [place]. TTE (9/12/22) and RUCHI (9/13/22) done. TTE showed EF 15-20%, LV that was moderately to severely dilated, LA that was moderately to severely dilated, RV that was moderately dilated, RA that appeared dilated, MV that moderate to severe regurgitation, AV that showed trivial regurgitation, PV with mild to moderate regurgitation, TV w/ moderate regurgitation, Pericardium w/ no effusion, PASP was upper normal, and LVIDd of 7cm. Patient noted to have elevated LA of 2.3 and AST/ALT of 296 and 350 respectively. Creatinine 1.2 near baseline of 1.05. States he has been producing good urine.     - Weaned off of  with stable HDs.    - GDMT: Cont entresto 24-26 and spironolactone 25mg daily.   - Transitioned to PO diuretic.   - Echo 9/16: EF 20, Mild-Mod MR, Mod-sev TR, LVIdd 7.47, TAPSE 1.46.    - Repeat echo 9/19: EF 15-20%, moderate-severely reduced RVSF, LVEDD 8.2cm.   - Maintain K>4 and Mg>2  - Pt given VAD book to review at home.    Paroxysmal atrial fibrillation  - On Amiodarone 400mg bid.   - Cont heparin drip. Transition to apixiban when necessary.    Hypothyroidism  - cont levothyroxine and lithothyronine.         Aftab Sue, NP  Heart Transplant  [facility] - Cardiology Stepdown

## 2022-09-22 NOTE — SUBJECTIVE & OBJECTIVE
Interval History: No acute events overnight. Feeling overall well this am. Slight dizziness but overall tolerable.     Continuous Infusions:   heparin (porcine) in D5W 12 Units/kg/hr (09/21/22 1804)     Scheduled Meds:   amiodarone  400 mg Oral BID    aspirin  325 mg Oral Daily    furosemide  60 mg Oral BID    levothyroxine  100 mcg Oral Before breakfast    liothyronine  10 mcg Oral Daily    polyethylene glycol  17 g Oral Daily    sacubitriL-valsartan  1 tablet Oral BID    sodium chloride 0.9%  10 mL Intravenous Q6H    spironolactone  25 mg Oral Daily     PRN Meds:acetaminophen, benzonatate, dextromethorphan-guaiFENesin  mg/5 ml, promethazine-codeine 6.25-10 mg/5 ml, senna-docusate 8.6-50 mg, sodium chloride 0.9%, Flushing PICC Protocol **AND** sodium chloride 0.9% **AND** sodium chloride 0.9%    Review of patient's allergies indicates:   Allergen Reactions    Vistaril [hydroxyzine pamoate] Other (See Comments)     Hypotension, dizziness     Objective:     Vital Signs (Most Recent):  Temp: 97.7 °F (36.5 °C) (09/22/22 0731)  Pulse: 85 (09/22/22 0749)  Resp: 18 (09/22/22 0731)  BP: 106/75 (09/22/22 0731)  SpO2: (!) 94 % (09/22/22 0731) Vital Signs (24h Range):  Temp:  [96.6 °F (35.9 °C)-97.7 °F (36.5 °C)] 97.7 °F (36.5 °C)  Pulse:  [83-92] 85  Resp:  [16-18] 18  SpO2:  [92 %-96 %] 94 %  BP: ()/(53-75) 106/75     Patient Vitals for the past 72 hrs (Last 3 readings):   Weight   09/22/22 0406 87.1 kg (192 lb 0.3 oz)   09/21/22 0437 87.3 kg (192 lb 7.4 oz)   09/20/22 0500 86.5 kg (190 lb 11.2 oz)       Body mass index is 25.33 kg/m².      Intake/Output Summary (Last 24 hours) at 9/22/2022 1050  Last data filed at 9/22/2022 0800  Gross per 24 hour   Intake --   Output 1000 ml   Net -1000 ml       CVP:  [10 mmHg] 10 mmHg    Telemetry: Vpcd with PVCs    Physical Exam  Vitals and nursing note reviewed.   Constitutional:       Appearance: He is well-developed.   HENT:      Head: Normocephalic.   Eyes:      Pupils:  Pupils are equal, round, and reactive to light.   Cardiovascular:      Rate and Rhythm: Normal rate and regular rhythm.      Heart sounds: Murmur heard.   Pulmonary:      Effort: Pulmonary effort is normal.      Breath sounds: Normal breath sounds.   Abdominal:      General: Bowel sounds are normal.      Palpations: Abdomen is soft.   Genitourinary:     Rectum: Guaiac stool: \.   Musculoskeletal:         General: Normal range of motion.      Cervical back: Normal range of motion and neck supple.   Skin:     General: Skin is warm and dry.   Neurological:      Mental Status: He is alert and oriented to person, place, and time.   Psychiatric:         Behavior: Behavior normal.       Significant Labs:  CBC:  Recent Labs   Lab 09/20/22 0437 09/21/22 0448 09/22/22 0421   WBC 8.06 9.13 8.55   RBC 5.65 5.77 5.72   HGB 14.3 14.6 14.6   HCT 45.2 47.6 46.5    243 253   MCV 80* 83 81*   MCH 25.3* 25.3* 25.5*   MCHC 31.6* 30.7* 31.4*       BNP:  No results for input(s): BNP in the last 168 hours.    Invalid input(s): BNPTRIAGELBLO  CMP:  Recent Labs   Lab 09/21/22 0448 09/21/22  1814 09/22/22 0421   GLU 91 165* 94   CALCIUM 10.0 9.5 9.5   ALBUMIN 4.0 3.7 3.8   PROT 7.7 7.2 7.5   * 129* 131*   K 4.8 4.4 4.4   CO2 24 24 25   CL 96 94* 96   BUN 35* 38* 33*   CREATININE 1.5* 1.9* 1.5*   ALKPHOS 97 94 97   * 103* 95*   AST 39 34 36   BILITOT 2.0* 1.6* 1.9*        Coagulation:   Recent Labs   Lab 09/20/22 0437 09/21/22 0448 09/22/22 0421   APTT 55.1* 62.4* 62.3*       LDH:  No results for input(s): LDH in the last 72 hours.  Microbiology:  Microbiology Results (last 7 days)       ** No results found for the last 168 hours. **          I have reviewed all pertinent labs within the past 24 hours.    Estimated Creatinine Clearance: 48.8 mL/min (A) (based on SCr of 1.5 mg/dL (H)).    Diagnostic Results:  I have reviewed all pertinent imaging results/findings within the past 24 hours.

## 2022-09-23 ENCOUNTER — DOCUMENTATION ONLY (OUTPATIENT)
Dept: TRANSPLANT | Facility: HOSPITAL | Age: 72
End: 2022-09-23
Payer: MEDICARE

## 2022-09-23 VITALS
TEMPERATURE: 98 F | RESPIRATION RATE: 16 BRPM | SYSTOLIC BLOOD PRESSURE: 96 MMHG | HEIGHT: 73 IN | DIASTOLIC BLOOD PRESSURE: 67 MMHG | WEIGHT: 191.13 LBS | HEART RATE: 79 BPM | BODY MASS INDEX: 25.33 KG/M2 | OXYGEN SATURATION: 93 %

## 2022-09-23 DIAGNOSIS — I50.9 CONGESTIVE HEART FAILURE, NYHA CLASS 3, UNSPECIFIED CONGESTIVE HEART FAILURE TYPE: Primary | ICD-10-CM

## 2022-09-23 LAB
ALBUMIN SERPL BCP-MCNC: 3.5 G/DL (ref 3.5–5.2)
ALP SERPL-CCNC: 95 U/L (ref 55–135)
ALT SERPL W/O P-5'-P-CCNC: 71 U/L (ref 10–44)
ANION GAP SERPL CALC-SCNC: 8 MMOL/L (ref 8–16)
AST SERPL-CCNC: 31 U/L (ref 10–40)
BILIRUB SERPL-MCNC: 1.5 MG/DL (ref 0.1–1)
BUN SERPL-MCNC: 32 MG/DL (ref 8–23)
CALCIUM SERPL-MCNC: 9.2 MG/DL (ref 8.7–10.5)
CHLORIDE SERPL-SCNC: 98 MMOL/L (ref 95–110)
CO2 SERPL-SCNC: 25 MMOL/L (ref 23–29)
CREAT SERPL-MCNC: 1.5 MG/DL (ref 0.5–1.4)
EST. GFR  (NO RACE VARIABLE): 48.6 ML/MIN/1.73 M^2
GLUCOSE SERPL-MCNC: 102 MG/DL (ref 70–110)
MAGNESIUM SERPL-MCNC: 2.3 MG/DL (ref 1.6–2.6)
POTASSIUM SERPL-SCNC: 4.2 MMOL/L (ref 3.5–5.1)
PROT SERPL-MCNC: 7.1 G/DL (ref 6–8.4)
SODIUM SERPL-SCNC: 131 MMOL/L (ref 136–145)

## 2022-09-23 PROCEDURE — 99233 SBSQ HOSP IP/OBS HIGH 50: CPT | Mod: GC,,, | Performed by: NURSE PRACTITIONER

## 2022-09-23 PROCEDURE — 83735 ASSAY OF MAGNESIUM: CPT | Performed by: STUDENT IN AN ORGANIZED HEALTH CARE EDUCATION/TRAINING PROGRAM

## 2022-09-23 PROCEDURE — A4216 STERILE WATER/SALINE, 10 ML: HCPCS | Performed by: INTERNAL MEDICINE

## 2022-09-23 PROCEDURE — 25000003 PHARM REV CODE 250: Performed by: INTERNAL MEDICINE

## 2022-09-23 PROCEDURE — 99233 PR SUBSEQUENT HOSPITAL CARE,LEVL III: ICD-10-PCS | Mod: GC,,, | Performed by: NURSE PRACTITIONER

## 2022-09-23 PROCEDURE — 25000003 PHARM REV CODE 250: Performed by: NURSE PRACTITIONER

## 2022-09-23 PROCEDURE — 80053 COMPREHEN METABOLIC PANEL: CPT | Performed by: STUDENT IN AN ORGANIZED HEALTH CARE EDUCATION/TRAINING PROGRAM

## 2022-09-23 PROCEDURE — 25000003 PHARM REV CODE 250: Performed by: STUDENT IN AN ORGANIZED HEALTH CARE EDUCATION/TRAINING PROGRAM

## 2022-09-23 PROCEDURE — 25000003 PHARM REV CODE 250: Performed by: PHYSICIAN ASSISTANT

## 2022-09-23 RX ORDER — SPIRONOLACTONE 25 MG/1
25 TABLET ORAL DAILY
Qty: 90 TABLET | Refills: 3 | Status: SHIPPED | OUTPATIENT
Start: 2022-09-23 | End: 2022-10-21

## 2022-09-23 RX ORDER — AMIODARONE HYDROCHLORIDE 400 MG/1
400 TABLET ORAL DAILY
Qty: 90 TABLET | Refills: 3 | Status: SHIPPED | OUTPATIENT
Start: 2022-09-23 | End: 2022-10-07

## 2022-09-23 RX ORDER — AMIODARONE HYDROCHLORIDE 400 MG/1
400 TABLET ORAL DAILY
Qty: 90 TABLET | Refills: 3
Start: 2022-09-23 | End: 2022-09-23 | Stop reason: SDUPTHER

## 2022-09-23 RX ORDER — DOXYCYCLINE 100 MG/1
100 CAPSULE ORAL EVERY 12 HOURS
Qty: 20 CAPSULE | Refills: 0 | Status: SHIPPED | OUTPATIENT
Start: 2022-09-23 | End: 2022-10-03

## 2022-09-23 RX ORDER — FUROSEMIDE 20 MG/1
60 TABLET ORAL 2 TIMES DAILY
Qty: 180 TABLET | Refills: 3 | Status: SHIPPED | OUTPATIENT
Start: 2022-09-23 | End: 2022-09-28

## 2022-09-23 RX ADMIN — Medication 10 ML: at 06:09

## 2022-09-23 RX ADMIN — AMIODARONE HYDROCHLORIDE 400 MG: 200 TABLET ORAL at 08:09

## 2022-09-23 RX ADMIN — APIXABAN 5 MG: 5 TABLET, FILM COATED ORAL at 08:09

## 2022-09-23 RX ADMIN — LIOTHYRONINE SODIUM 10 MCG: 5 TABLET ORAL at 08:09

## 2022-09-23 RX ADMIN — LEVOTHYROXINE SODIUM 100 MCG: 100 TABLET ORAL at 05:09

## 2022-09-23 RX ADMIN — POLYETHYLENE GLYCOL 3350 17 G: 17 POWDER, FOR SOLUTION ORAL at 08:09

## 2022-09-23 RX ADMIN — SACUBITRIL AND VALSARTAN 1 TABLET: 24; 26 TABLET, FILM COATED ORAL at 08:09

## 2022-09-23 RX ADMIN — FUROSEMIDE 60 MG: 40 TABLET ORAL at 08:09

## 2022-09-23 RX ADMIN — ASPIRIN 325 MG ORAL TABLET 325 MG: 325 PILL ORAL at 08:09

## 2022-09-23 RX ADMIN — SPIRONOLACTONE 25 MG: 25 TABLET, FILM COATED ORAL at 08:09

## 2022-09-23 NOTE — DISCHARGE SUMMARY
[facility] - Cardiology Stepdown  Heart Transplant  Discharge Summary      Patient Name: Artemio Ogden  MRN: [id number]  Admission Date: 9/15/2022  Hospital Length of Stay: 8 days  Discharge Date and Time: 09/23/2022 3:20 PM  Attending Physician: Keila Lam MD   Discharging Provider: Aftab Sue NP  Primary Care Provider: Danna Lanza MD     HPI:  73 y/o M w/ PMH of NICM, HFrEF (EF 15% as of 7/6/22) s/p ICD and pacemaker, severe MR s/p MV repair (6/30/22), paroxysmal AFIB s/p MAZE and left atrial appendage resection (6/30/22), cardiac tamponade s/p pericardial window (7/17/22), bilateral carotid artery stenosis, hypothyroidism and lumbar spinal stenosis who initially presented to [facility] on 9/11 endorsing worsening SOB and continuous hacking cough with pink sputum. He was admitted with acute decompensated heart failure and started on Lasix drip of 5 and dobutamine 2.5. During his hospitalization there patient underwent several procedures including a CTA for an elevated d-dimer which was negative for PE but showed a small right sided pleural effusion. Patient also had a TTE (9/12/22) and RUCHI (9/13/22) done. TTE showed EF 15-20%, LV that was moderately to severely dilated, LA that was moderately to severely dilated, RV that was moderately dilated, RA that appeared dilated, MV that moderate to severe regurgitation, AV that showed trivial regurgitation, PV with mild to moderate regurgitation, TV w/ moderate regurgitation, Pericardium w/ no effusion, PASP was upper normal, and LVIDd of 7cm. RUCHI revealed severely depressed LV systolic function, EF of 15-20%, moderate MR after failed mitral valve repair, dilated right chambers. Patient was transferred to [facility] for higher level of care for his decompensated heart failure presenting with  2.5 and Lasix 5 drip.      Of note patient is s/p MVR/MAZE/ablation at [facility] on 6/5/22 and subsequently was admitted to [facility]  center on 7/17/22 w/ a large pericardial effusion w/ cardiac tamponade and sternal dehiscence. He underwent pericardial window, thoracostomy, and sternal debridement per Dr. Meeks. Left Heart Cath (5/20/22) showed mild to moderate CAD.      At the time of examination patient denied any SOB, chest pain, dizziness, fever, chills, abdominal pain, nausea, vomiting. He states he has been compliant with his medications and diet. This is his first hospitalization for HF since his surgery in June of 2022.        * No surgery found *     Hospital Course: Pt was admitted and diuresed with IV Lasix to which he responded very well. He was weaned off  successfully and GDMT was adjusted(he tolerated entresto)as well. He was started on apixiban as well for pafib. Repeated echo once euvolemic. We talked to him briefly regarding LVAD and he will take binder home to review. He was discharged home once euvolemic and can f/u in clinic in ~ 1 week with labs prior. Of note, on day of D/C, he had small abscess at the superior aspect of old sternotomy incision which was opened at bedside. Small amount of pus and old hematoma expressed. Wound measured [id number] and did not go down to sternum. Would packed.   Will need daily packing changes. He will f/u with wound care on [facility](already follows with them). Can also refer him to cardiac rehab per his request.     Consults (From admission, onward)          Status Ordering Provider     [facility]  [facility]        Provider:  (Not yet assigned)    Completed KAMRYN FLOR            Pending Diagnostic Studies:       Procedure Component Value Units Date/Time    CBC Without Differential [[id number]] Collected: 09/16/22 0433    Order Status: Sent Lab Status: In process Updated: 09/16/22 0433    Specimen: Blood           Final Active Diagnoses:    Diagnosis Date Noted POA    PRINCIPAL PROBLEM:  Acute on chronic combined systolic and diastolic heart failure [I50.43]  09/16/2022 Yes    Paroxysmal atrial fibrillation [I48.0] 05/31/2022 Yes    Hypothyroidism [E03.9] 07/06/2022 Yes      Problems Resolved During this Admission:      Discharged Condition: good    Disposition: Home or Self Care      Patient Instructions:   No discharge procedures on file.  Medications:  Reconciled Home Medications:      Medication List        START taking these medications      amiodarone 400 MG tablet  Commonly known as: PACERONE  Take 1 tablet (400 mg total) by mouth once daily.     doxycycline 100 MG Cap  Commonly known as: VIBRAMYCIN  Take 1 capsule (100 mg total) by mouth every 12 (twelve) hours. for 10 days     sacubitriL-valsartan 24-26 mg per tablet  Commonly known as: ENTRESTO  Take 1 tablet by mouth 2 (two) times daily.     spironolactone 25 MG tablet  Commonly known as: ALDACTONE  Take 1 tablet (25 mg total) by mouth once daily.            CHANGE how you take these medications      furosemide 20 MG tablet  Commonly known as: LASIX  Take 3 tablets (60 mg total) by mouth 2 (two) times a day.  What changed:   how much to take  when to take this            CONTINUE taking these medications      albuterol 90 mcg/actuation inhaler  Commonly known as: PROVENTIL/VENTOLIN HFA  Inhale 2 puffs every 4 hours by inhalation route as needed.     apixaban 5 mg Tab  Commonly known as: ELIQUIS  Take 5 mg by mouth 2 (two) times daily.     ARGININE HCL (L-ARGININE) ORAL  Take 750 mg by mouth once daily.     ascorbic acid (vitamin C) 1000 MG tablet  Commonly known as: VITAMIN C  Take 1,500 mg by mouth 2 (two) times daily.     aspirin 325 MG tablet  Take 325 mg by mouth once daily.     azelastine 137 mcg (0.1 %) nasal spray  Commonly known as: ASTELIN  1 spray by Nasal route 2 (two) times daily.     cholecalciferol (vitamin D3) 50 mcg (2,000 unit) Tab  Commonly known as: VITAMIN D3  Take 5,000 Units by mouth once daily.     co-enzyme Q-10 30 mg capsule  Take 400 mg by mouth 2 (two) times daily.     fexofenadine  "180 MG tablet  Commonly known as: ALLEGRA  Take 1 tablet every day by oral route.     guaiFENesin 400 mg Tab  Commonly known as: HUMIBID E  guaifenesin   400 mg 2 in the pm as needed     levothyroxine 100 MCG tablet  Commonly known as: SYNTHROID  Take 100 mcg by mouth before breakfast.     liothyronine 5 MCG Tab  Commonly known as: CYTOMEL  Take 10 mcg by mouth once daily.     mometasone 50 mcg/actuation nasal spray  Commonly known as: NASONEX  2 sprays by Nasal route once daily.     montelukast 10 mg tablet  Commonly known as: SINGULAIR  Take 10 mg by mouth once daily.     QUERCETIN DIHYDRATE (BULK) MISC  500 mg by Misc.(Non-Drug; Combo Route) route.     vitamin A 26586 UNIT capsule  Take 5,000 Units by mouth once daily.     zinc sulfate 50 mg zinc (220 mg) capsule  Commonly known as: ZINCATE  Take 220 mg by mouth once daily.            STOP taking these medications      atenoloL 50 MG tablet  Commonly known as: TENORMIN     BD LUER-KHADRA SYRINGE 3 mL 21 gauge x 1 1/2" Syrg  Generic drug: syringe with needle     cetirizine 10 MG tablet  Commonly known as: ZYRTEC     diphenhydrAMINE 25 mg capsule  Commonly known as: BENADRYL     potassium chloride 10 MEQ Tbsr  Commonly known as: KLOR-CON     tadalafiL 20 MG Tab  Commonly known as: CIALIS     testosterone cypionate 200 mg/mL injection  Commonly known as: DEPOTESTOTERONE CYPIONATE              Aftab Sue NP  Heart Transplant  [facility] - [facility]  "

## 2022-09-23 NOTE — PLAN OF CARE
Pt remained free of injuries, falls, and trauma. VSS. No complaints of pain mentioned. Strict intake and output being monitored. Fall precautions maintained. Possible d/c home. Plan of care reviewed w/ pt. Pt verbalized understanding. Will continue to monitor.

## 2022-09-23 NOTE — TREATMENT PLAN
CT Surgery     Patient with small abscess at the superior aspect of old sternotomy incision which was opened at bedside. Small amount of pus and old hematoma expressed. Wound measured 6x0r8re and did not go down to sternum. Would packed.   Will need daily packing changes. Discussed with HTS and wound care.     Niraj Florian MD  Cardiothoracic Surgery PGY6

## 2022-09-23 NOTE — PROGRESS NOTES
DISCHARGE    SW to pt's room for discharge today.  Pt presents as AAO x4, calm, cooperative, and asking and answering questions appropriately, caregivers not present.  Pt verbalizes understanding and agreement with plan for d/c to home today.  Pt reports his daughter will transport him home today.  Pt denies any d/c needs, and none identified by medical team. LCSW faxed HH orders to [facility] (p: [phone], f: [phone]).  Pt reports coping well at this time, and denies any needs or concerns to SW.  SW providing ongoing psychosocial and counseling support, education, resources, assistance, and discharge planning as indicated.  SW remains available.

## 2022-09-23 NOTE — PROGRESS NOTES
[facility] - Cardiology Stepdown  Heart Transplant  Progress Note    Patient Name: Artemio Ogden  MRN: [id number]  Admission Date: 9/15/2022  Hospital Length of Stay: 8 days  Attending Physician: Keila Lam MD  Primary Care Provider: Danna Lanza MD  Principal Problem:Acute on chronic combined systolic and diastolic heart failure    Subjective:     Interval History: No acute events overnight. Feeling overall well this am.       Scheduled Meds:   amiodarone  400 mg Oral BID    apixaban  5 mg Oral BID    aspirin  325 mg Oral Daily    furosemide  60 mg Oral BID    levothyroxine  100 mcg Oral Before breakfast    liothyronine  10 mcg Oral Daily    polyethylene glycol  17 g Oral Daily    sacubitriL-valsartan  1 tablet Oral BID    sodium chloride 0.9%  10 mL Intravenous Q6H    spironolactone  25 mg Oral Daily     PRN Meds:acetaminophen, benzonatate, dextromethorphan-guaiFENesin  mg/5 ml, promethazine-codeine 6.25-10 mg/5 ml, senna-docusate 8.6-50 mg, sodium chloride 0.9%, Flushing PICC Protocol **AND** sodium chloride 0.9% **AND** sodium chloride 0.9%    Review of patient's allergies indicates:   Allergen Reactions    Vistaril [hydroxyzine pamoate] Other (See Comments)     Hypotension, dizziness     Objective:     Vital Signs (Most Recent):  Temp: 97.6 °F (36.4 °C) (09/23/22 0809)  Pulse: 90 (09/23/22 0809)  Resp: 16 (09/23/22 0809)  BP: 96/67 (09/23/22 0809)  SpO2: (!) 93 % (09/23/22 0809) Vital Signs (24h Range):  Temp:  [96.9 °F (36.1 °C)-98.2 °F (36.8 °C)] 97.6 °F (36.4 °C)  Pulse:  [78-96] 90  Resp:  [16-20] 16  SpO2:  [93 %-98 %] 93 %  BP: ()/(52-67) 96/67     Patient Vitals for the past 72 hrs (Last 3 readings):   Weight   09/23/22 0500 86.7 kg (191 lb 2.2 oz)   09/22/22 0406 87.1 kg (192 lb 0.3 oz)   09/21/22 0437 87.3 kg (192 lb 7.4 oz)       Body mass index is 25.22 kg/m².      Intake/Output Summary (Last 24 hours) at 9/23/2022 1005  Last data filed at 9/23/2022 0500  Gross per 24  hour   Intake 420 ml   Output 2250 ml   Net -1830 ml            Telemetry: Vpcd with PVCs    Physical Exam  Vitals and nursing note reviewed.   Constitutional:       Appearance: He is well-developed.   HENT:      Head: Normocephalic.   Eyes:      Pupils: Pupils are equal, round, and reactive to light.   Cardiovascular:      Rate and Rhythm: Normal rate and regular rhythm.      Heart sounds: Murmur heard.   Pulmonary:      Effort: Pulmonary effort is normal.      Breath sounds: Normal breath sounds.   Abdominal:      General: Bowel sounds are normal.      Palpations: Abdomen is soft.   Genitourinary:     Rectum: Guaiac stool: \.   Musculoskeletal:         General: Normal range of motion.      Cervical back: Normal range of motion and neck supple.   Skin:     General: Skin is warm and dry.   Neurological:      Mental Status: He is alert and oriented to person, place, and time.   Psychiatric:         Behavior: Behavior normal.       Significant Labs:  CBC:  Recent Labs   Lab 09/20/22 0437 09/21/22 0448 09/22/22 0421   WBC 8.06 9.13 8.55   RBC 5.65 5.77 5.72   HGB 14.3 14.6 14.6   HCT 45.2 47.6 46.5    243 253   MCV 80* 83 81*   MCH 25.3* 25.3* 25.5*   MCHC 31.6* 30.7* 31.4*       BNP:  No results for input(s): BNP in the last 168 hours.    Invalid input(s): BNPTRIAGELBLO  CMP:  Recent Labs   Lab 09/22/22 0421 09/22/22  1709 09/23/22  0546   GLU 94 89 102   CALCIUM 9.5 9.6 9.2   ALBUMIN 3.8 3.6 3.5   PROT 7.5 7.3 7.1   * 131* 131*   K 4.4 4.9 4.2   CO2 25 24 25   CL 96 97 98   BUN 33* 36* 32*   CREATININE 1.5* 1.5* 1.5*   ALKPHOS 97 106 95   ALT 95* 82* 71*   AST 36 32 31   BILITOT 1.9* 1.3* 1.5*        Coagulation:   Recent Labs   Lab 09/20/22 0437 09/21/22 0448 09/22/22 0421   APTT 55.1* 62.4* 62.3*       LDH:  No results for input(s): LDH in the last 72 hours.  Microbiology:  Microbiology Results (last 7 days)       ** No results found for the last 168 hours. **          I have reviewed all  pertinent labs within the past 24 hours.    Estimated Creatinine Clearance: 48.8 mL/min (A) (based on SCr of 1.5 mg/dL (H)).    Diagnostic Results:  I have reviewed all pertinent imaging results/findings within the past 24 hours.    Assessment and Plan:     Mr. Everette Ogden is a 71 y/o M w/ PMH of NICM, HFrEF (EF 15% as of 7/6/22) s/p ICD and pacemaker, severe MR s/p MV repair (6/30/22), paroxysmal AFIB s/p MAZE and left atrial appendage resection (6/30/22), cardiac tamponade s/p pericardial window (7/17/22), bilateral carotid artery stenosis, hypothyroidism and lumbar spinal stenosis who initially presented to [facility] on 9/11 endorsing worsening SOB and continuous hacking cough with pink sputum. He was admitted with acute decompensated heart failure and started on Lasix drip of 5 and dobutamine 2.5. During his hospitalization there patient underwent several procedures including a CTA for an elevated d-dimer which was negative for PE but showed a small right sided pleural effusion. Patient also had a TTE (9/12/22) and RUCHI (9/13/22) done. TTE showed EF 15-20%, LV that was moderately to severely dilated, LA that was moderately to severely dilated, RV that was moderately dilated, RA that appeared dilated, MV that moderate to severe regurgitation, AV that showed trivial regurgitation, PV with mild to moderate regurgitation, TV w/ moderate regurgitation, Pericardium w/ no effusion, PASP was upper normal, and LVIDd of 7cm. RUCHI revealed severely depressed LV systolic function, EF of 15-20%, moderate MR after failed mitral valve repair, dilated right chambers. Patient was transferred to [facility] for higher level of care for his decompensated heart failure presenting with  2.5 and Lasix 5 drip.     Of note patient is s/p MVR/MAZE/ablation at [facility] on 6/5/22 and subsequently was admitted to [facility] on 7/17/22 w/ a large pericardial effusion w/ cardiac tamponade and sternal  dehiscence. He underwent pericardial window, thoracostomy, and sternal debridement per Dr. Meeks. Left Heart Cath (5/20/22) showed mild to moderate CAD.     At the time of examination patient denied any SOB, chest pain, dizziness, fever, chills, abdominal pain, nausea, vomiting. He states he has been compliant with his medications and diet. This is his first hospitalization for HF since his surgery in June of 2022.     Home medications:   Atenolol 50mg daily (stopped by home cardiologist prior to transferring)  Levothyroxine 100 mcg daily  Liothyronine (T3) 5mcg daily   Aspirin 325mg daily  Lasix 40mg daily  Zyrtec 10 mg daily  Singulair 10mg daily   Amiodarone 400mg bid (started during hospitalization due to NSVT and frequent PVCs)    Of note as per patient, he has taken Coreg (SOB), metoprolol (muscle cramps, depression, visual disturbance), spironolactone (parasthesia, cramps, cough), Bystolic (edema, muscle cramps, wheezing), lisinopril (muscle cramps), and losartan (cough and muscle cramps) in the past and they were discontinued by his Cardiologist after patient reported apparent side effects as seen in parenthesis.  Patient was also on anticoagulation with Eliquis and then Xarelto but was taken off due to cramping and constipation since August 2022.     FMH: Father possible HF 2/2 viral mycarditis. Grandparents (mother) ICM.     Social: Stopped smoking and alcohol 32 yrs ago. Did have 25 yr hx of 1ppd. No drugs. Lives at home with wife of 41 years. 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 Now daughter who lives next door with children takes care of both parents.       * Acute on chronic combined systolic and diastolic heart failure  Mr. Everette Ogden is a 71 y/o M w/ PMH of NICM, HFrEF (EF 15% as of 7/6/22) s/p ICD and pacemaker, severe MR s/p MV repair (6/30/22), paroxysmal AFIB s/p MAZE and left atrial appendage resection (6/30/22), cardiac tamponade s/p pericardial window  (7/17/22), bilateral carotid artery stenosis, hypothyroidism and lumbar spinal stenosis admitted for acute on chronic decompensate HF secondary to unknown cause. Possibly due to failed MV repair as noted on RUCHI at [place]. TTE (9/12/22) and RUCHI (9/13/22) done. TTE showed EF 15-20%, LV that was moderately to severely dilated, LA that was moderately to severely dilated, RV that was moderately dilated, RA that appeared dilated, MV that moderate to severe regurgitation, AV that showed trivial regurgitation, PV with mild to moderate regurgitation, TV w/ moderate regurgitation, Pericardium w/ no effusion, PASP was upper normal, and LVIDd of 7cm. Patient noted to have elevated LA of 2.3 and AST/ALT of 296 and 350 respectively. Creatinine 1.2 near baseline of 1.05. States he has been producing good urine.     - Weaned off of  with stable HDs.    - GDMT: Cont entresto 24-26 and spironolactone 25mg daily.   - Transitioned to PO diuretic.   - Echo 9/16: EF 20, Mild-Mod MR, Mod-sev TR, LVIdd 7.47, TAPSE 1.46.    - Repeat echo 9/19: EF 15-20%, moderate-severely reduced RVSF, LVEDD 8.2cm.   - Maintain K>4 and Mg>2  - Pt given VAD book to review at home.    Paroxysmal atrial fibrillation  - On Amiodarone 400mg bid.   - Cont heparin drip. Transition to apixiban when necessary.    Hypothyroidism  - cont levothyroxine and lithothyronine.           Aftab Sue, NP  Heart Transplant  [facility] - Cardiology Stepdown

## 2022-09-23 NOTE — PROGRESS NOTES
[facility] - Cardiology Stepdown  Wound Care    Patient Name:  Artemio Ogden   MRN:  [id number]  Date: 9/23/2022  Diagnosis: Acute on chronic combined systolic and diastolic heart failure    History:     Past Medical History:   Diagnosis Date    A-fib     Digestive disorder     reflux    Mitral regurgitation 5/31/2022    Sleep apnea     No CPAP    Thyroid disease        Social History     Socioeconomic History    Marital status:    Tobacco Use    Smoking status: Former    Smokeless tobacco: Never   Substance and Sexual Activity    Alcohol use: No    Drug use: No    Sexual activity: Yes     Partners: Female       Precautions:     Allergies as of 09/14/2022 - Reviewed 06/30/2022   Allergen Reaction Noted    Ace inhibitors  05/31/2022    Arb-angiotensin receptor antagonist  05/31/2022       WOC Assessment Details/Treatment   9/23 Carlitos RN Consult received uppermid sternal chest old incision site. Doctor of CTS performed bedside I&D. Used curity antimicrobial packing strip, drawtex, 4x4 and tape. Pt tolerated the procedure.   Wound care orders: packing gauze or iodasorb, 4x4 gauze, and tape daily. Wash hands before woundcare.     09/23/22 1300   WOCN Assessment   WOCN Total Time (mins) 30   Visit Date 09/23/22   Visit Time 1300   Consult Type New   WOCN Speciality Wound   Intervention assessed;chart review;orders;coordination of care   Teaching on-going        Incision/Site 06/30/22 0634 Chest   Date First Assessed/Time First Assessed: 06/30/22 0634   Location: Chest   Wound Image    Drainage Amount Moderate   Drainage Characteristics/Odor Creamy;Brown;Yellow;Sanguineous;Purulent   Appearance Moist   Black (%), Wound Tissue Color 0 %   Red (%), Wound Tissue Color 100 %   Yellow (%), Wound Tissue Color 0 %   Periwound Area Moist   Wound Length (cm) 2 cm   Wound Width (cm) 1 cm   Wound Depth (cm) 1 cm   Wound Volume (cm^3) 2 cm^3   Wound Surface Area (cm^2) 2 cm^2   Care Other (see comments)  (9/23 Carlitos CONDON  Consult received uppermid sternal chest old incision site. Doctor of CTS performed bedside I&D. Used curity antimicrobial packing strip, drawtex, 4x4 and tape. PT tolerated the procedure.)   Dressing Change Due 09/24/22 09/23/2022

## 2022-09-23 NOTE — SUBJECTIVE & OBJECTIVE
Interval History: No acute events overnight. Feeling overall well this am.       Scheduled Meds:   amiodarone  400 mg Oral BID    apixaban  5 mg Oral BID    aspirin  325 mg Oral Daily    furosemide  60 mg Oral BID    levothyroxine  100 mcg Oral Before breakfast    liothyronine  10 mcg Oral Daily    polyethylene glycol  17 g Oral Daily    sacubitriL-valsartan  1 tablet Oral BID    sodium chloride 0.9%  10 mL Intravenous Q6H    spironolactone  25 mg Oral Daily     PRN Meds:acetaminophen, benzonatate, dextromethorphan-guaiFENesin  mg/5 ml, promethazine-codeine 6.25-10 mg/5 ml, senna-docusate 8.6-50 mg, sodium chloride 0.9%, Flushing PICC Protocol **AND** sodium chloride 0.9% **AND** sodium chloride 0.9%    Review of patient's allergies indicates:   Allergen Reactions    Vistaril [hydroxyzine pamoate] Other (See Comments)     Hypotension, dizziness     Objective:     Vital Signs (Most Recent):  Temp: 97.6 °F (36.4 °C) (09/23/22 0809)  Pulse: 90 (09/23/22 0809)  Resp: 16 (09/23/22 0809)  BP: 96/67 (09/23/22 0809)  SpO2: (!) 93 % (09/23/22 0809) Vital Signs (24h Range):  Temp:  [96.9 °F (36.1 °C)-98.2 °F (36.8 °C)] 97.6 °F (36.4 °C)  Pulse:  [78-96] 90  Resp:  [16-20] 16  SpO2:  [93 %-98 %] 93 %  BP: ()/(52-67) 96/67     Patient Vitals for the past 72 hrs (Last 3 readings):   Weight   09/23/22 0500 86.7 kg (191 lb 2.2 oz)   09/22/22 0406 87.1 kg (192 lb 0.3 oz)   09/21/22 0437 87.3 kg (192 lb 7.4 oz)       Body mass index is 25.22 kg/m².      Intake/Output Summary (Last 24 hours) at 9/23/2022 1005  Last data filed at 9/23/2022 0500  Gross per 24 hour   Intake 420 ml   Output 2250 ml   Net -1830 ml            Telemetry: Vpcd with PVCs    Physical Exam  Vitals and nursing note reviewed.   Constitutional:       Appearance: He is well-developed.   HENT:      Head: Normocephalic.   Eyes:      Pupils: Pupils are equal, round, and reactive to light.   Cardiovascular:      Rate and Rhythm: Normal rate and regular  rhythm.      Heart sounds: Murmur heard.   Pulmonary:      Effort: Pulmonary effort is normal.      Breath sounds: Normal breath sounds.   Abdominal:      General: Bowel sounds are normal.      Palpations: Abdomen is soft.   Genitourinary:     Rectum: Guaiac stool: \.   Musculoskeletal:         General: Normal range of motion.      Cervical back: Normal range of motion and neck supple.   Skin:     General: Skin is warm and dry.   Neurological:      Mental Status: He is alert and oriented to person, place, and time.   Psychiatric:         Behavior: Behavior normal.       Significant Labs:  CBC:  Recent Labs   Lab 09/20/22 0437 09/21/22 0448 09/22/22 0421   WBC 8.06 9.13 8.55   RBC 5.65 5.77 5.72   HGB 14.3 14.6 14.6   HCT 45.2 47.6 46.5    243 253   MCV 80* 83 81*   MCH 25.3* 25.3* 25.5*   MCHC 31.6* 30.7* 31.4*       BNP:  No results for input(s): BNP in the last 168 hours.    Invalid input(s): BNPTRIAGELBLO  CMP:  Recent Labs   Lab 09/22/22  0421 09/22/22  1709 09/23/22  0546   GLU 94 89 102   CALCIUM 9.5 9.6 9.2   ALBUMIN 3.8 3.6 3.5   PROT 7.5 7.3 7.1   * 131* 131*   K 4.4 4.9 4.2   CO2 25 24 25   CL 96 97 98   BUN 33* 36* 32*   CREATININE 1.5* 1.5* 1.5*   ALKPHOS 97 106 95   ALT 95* 82* 71*   AST 36 32 31   BILITOT 1.9* 1.3* 1.5*        Coagulation:   Recent Labs   Lab 09/20/22  0437 09/21/22 0448 09/22/22 0421   APTT 55.1* 62.4* 62.3*       LDH:  No results for input(s): LDH in the last 72 hours.  Microbiology:  Microbiology Results (last 7 days)       ** No results found for the last 168 hours. **          I have reviewed all pertinent labs within the past 24 hours.    Estimated Creatinine Clearance: 48.8 mL/min (A) (based on SCr of 1.5 mg/dL (H)).    Diagnostic Results:  I have reviewed all pertinent imaging results/findings within the past 24 hours.

## 2022-09-23 NOTE — PROGRESS NOTES
[facility]   Heart Transplant Clinic  [address] [place] [address]   [phone] after hours        HOME  HEALTH ORDERS      Admit to Home Health    Diagnosis:   Patient Active Problem List   Diagnosis    Spinal stenosis, lumbar region without neurogenic claudication    Vitamin B12 deficiency    Vitamin D deficiency    History of hepatitis    Mitral regurgitation    Paroxysmal atrial fibrillation    Pre-op exam    Bilateral carotid artery stenosis    Encounter for weaning from ventilator    Stress hyperglycemia    S/P mitral valve repair    Acute blood loss anemia    Hypothyroidism    CHF (congestive heart failure)    Impaired mobility and ADLs    Acute on chronic combined systolic and diastolic heart failure       Patient is homebound due to:   Diet: Low Sodium  Acitivities: As Tolerated    Nursing:   SN to complete comprehensive assessment including routine vital signs. Instruct on disease process and s/s of complications to report to MD. Review/verify medication list sent home with the patient at time of discharge  and instruct patient/caregiver as needed. Frequency may be adjusted depending on start of care date.    Notify MD if SBP > 160 or < 90; DBP > 90 or < 50; HR > 120 or < 50; Temp > 101; Weight gain >3lbs in 1 day or 5lbs in 1 week.    LABS:  SN to perform labs: Weekly bmp, mag    CONSULTS:      Physical Therapy to evaluate and treat. Evaluate for home safety and equipment needs; Establish/upgrade home exercise program. Perform / instruct on therapeutic exercises, gait training, transfer training, and Range of Motion.    Occupational Therapy to evaluate and treat. Evaluate home environment for safety and equipment needs. Perform/Instruct on transfers, ADL training, ROM, and therapeutic exercises.      Send initial Home Health orders to [facility] attending physician on call.  Send follow up questions to [phone] or fax:                   Heart  Failure:      [phone]

## 2022-09-23 NOTE — NURSING
Patient being discharged home, avs given, all questions/concerns were answered, medications were sent to [facility] pharmacy of choice, IV and heart monitor were removed, patient left via wheelchair per transport with daughter.

## 2022-09-26 ENCOUNTER — PATIENT MESSAGE (OUTPATIENT)
Dept: ADMINISTRATIVE | Facility: CLINIC | Age: 72
End: 2022-09-26
Payer: MEDICARE

## 2022-09-26 ENCOUNTER — TELEPHONE (OUTPATIENT)
Dept: TRANSPLANT | Facility: CLINIC | Age: 72
End: 2022-09-26

## 2022-09-26 ENCOUNTER — TELEPHONE (OUTPATIENT)
Dept: TRANSPLANT | Facility: HOSPITAL | Age: 72
End: 2022-09-26
Payer: MEDICARE

## 2022-09-26 ENCOUNTER — PATIENT OUTREACH (OUTPATIENT)
Dept: ADMINISTRATIVE | Facility: CLINIC | Age: 72
End: 2022-09-26
Payer: MEDICARE

## 2022-09-26 DIAGNOSIS — I50.9 CONGESTIVE HEART FAILURE, NYHA CLASS 3, UNSPECIFIED CONGESTIVE HEART FAILURE TYPE: Primary | ICD-10-CM

## 2022-09-26 NOTE — TELEPHONE ENCOUNTER
3:45 pm: Received phone call from [facility] nurse. Rose Mary  She is at pts home and called to report low bp readings she took at this time   She reported (L) arm:  78/56   Rt Arm:  74/48  HR 65 Also that pt was dizzy after am Entresto , but has gotten beter.   Asked and she said pts bp over the weekend ahs been: 80-90/50's     Note: pt was just discharged from the hospital 9/23/22 and has a follow up appt scheduled in [facility] clinic for the first time 9/28/22   I reviewed d/c summary by [facility] NP, Sarah    He is taking Entresto 24/26 one tablet bid  Lasix 60 mg bid (3- 20 mg tablets ) @ 9 am and noon  Spironolactone 25 mg once daily   He did stop taking Atenolol     Dr. Lam d/c provider, so will route this note to her for her awareness and of any orders.     Wts:    9/24:  191.4  9/25:  190.8  9/26:  190.8   Wt prior to recent hospital admission: 200.2    Noted hospital wts on admit: 226 and d/c 190.4    Asked HHN instruct pt to take Lasix 60 mg am 9:00 if that's his preference, but not to take at noon , to take the 2nd dose around 4:00 pm -it needs to be spread out while also considering so he is not up lat at night    Asked her to have him hold this pm dose of Entresto -take and record his bp tonight   And take his bp tomorrow am prior to am Entresto and to call this office with the reading prior to taking am entresto   she told pt while on the phone with me and pt agreed.

## 2022-09-26 NOTE — PROGRESS NOTES
C3 nurse attempted to contact Artemio Ogden  for a TCC post hospital discharge follow up call.  Spoke with his daughter Robyn per his request.  Robynis unable to conduct the call @ this time and she requested a callback.    The patient does not have a scheduled HOSFU appointment within 5-7 days post hospital discharge date 9/23/2022. Unable to route to [facility] PCP.

## 2022-09-26 NOTE — TELEPHONE ENCOUNTER
LCSW got VM about HH. LCSW called [facility] (p: [phone]) and confirm they have everything they need to see Pt after discharge.

## 2022-09-26 NOTE — PROGRESS NOTES
C3 nurse attempted to contact Artemio Ogden's daughter Robyn Moe for a TCC post hospital discharge follow up call. No answer.  LVM with contact information.  The patient does not have a scheduled HOSFU appointment, and the pt does not have an [facility] PCP.  Message also route to the patient's portal.

## 2022-09-27 ENCOUNTER — TELEPHONE (OUTPATIENT)
Dept: TRANSPLANT | Facility: CLINIC | Age: 72
End: 2022-09-27

## 2022-09-27 NOTE — TELEPHONE ENCOUNTER
0945 am: Returned call to pt   Blood pressures:  Reported this am 103/68  Last night 1030 pm:  108/69  800 pm: 86/57    Did not take last pm dose of Entresto or this am:   Feels good this am  energy , no light headed or dizziness    Has not taken any of his am medicines yet    Pt reported wt today 188.4  Pt reported prior to last hospital stay he was taking Lasix 40 mg in the am only and Dobutamine   Was not taking Entresto    With this said _ Dr. Lam may elect for pt to continue Entresto and decrease Lasix  To clinic to discuss with Dr. Lam-she is not available at this time    Asked pt to take all of his morning medicines now, but to not take Lasix, Entresto or Spironolactone for now and I will instruct him further this am as soon as I am able to speak with Dr. Lam  Pt voiced understanding       ----- Message from Kaye Patricia sent at 9/27/2022  9:33 AM CDT -----  Regarding: Labs/meds  Pt [phone] says he was told to call the day before his appt to get instructions on meds before he does his labs.    Thanks

## 2022-09-27 NOTE — PROGRESS NOTES
C3 nurse spoke with Artemio Ogden's daughter Robyn Moe for a TCC post hospital discharge follow up call. Nurse offered to scheduled TCC hospital follow-up appointment NP visit/referral.  Nurse was unable to schedule with patient's non-[facility] PCP.  The patient's daughter declined appointment at this time.

## 2022-09-27 NOTE — TELEPHONE ENCOUNTER
1220 pm:  Reviewed all with Dr. Lam  VORB: Dr. Pennington/Alexander, RN:  pt to hold Lasix today and tomorrow am, and re evaluate diuretic usage tomorrow   Pt to continue Entresto  24/26 one tablet twice daily now and Aldactone 25 mg once daily now    1230 pm : Called pt and instructed on above   Pt repeated back correctly   Pt will take Entresto 24/26 now and again 10 pm tonight and will resume Spironolactone 25 mg once daily now. Pt understands not to take Furosemide ( Lasix)     Dr. Lam aware last labs 9/23/22 and will have repeat labs tomorrow prior to visit with her in the am.

## 2022-09-28 ENCOUNTER — LAB VISIT (OUTPATIENT)
Dept: LAB | Facility: HOSPITAL | Age: 72
End: 2022-09-28
Payer: MEDICARE

## 2022-09-28 ENCOUNTER — OFFICE VISIT (OUTPATIENT)
Dept: TRANSPLANT | Facility: CLINIC | Age: 72
End: 2022-09-28
Payer: MEDICARE

## 2022-09-28 VITALS
HEIGHT: 73 IN | WEIGHT: 195.13 LBS | BODY MASS INDEX: 25.86 KG/M2 | HEART RATE: 87 BPM | DIASTOLIC BLOOD PRESSURE: 58 MMHG | SYSTOLIC BLOOD PRESSURE: 84 MMHG

## 2022-09-28 DIAGNOSIS — I50.9 CONGESTIVE HEART FAILURE, NYHA CLASS 3, UNSPECIFIED CONGESTIVE HEART FAILURE TYPE: ICD-10-CM

## 2022-09-28 DIAGNOSIS — I48.0 PAROXYSMAL ATRIAL FIBRILLATION: ICD-10-CM

## 2022-09-28 DIAGNOSIS — Z98.890 S/P MITRAL VALVE REPAIR: ICD-10-CM

## 2022-09-28 DIAGNOSIS — I50.42 CHRONIC COMBINED SYSTOLIC AND DIASTOLIC CONGESTIVE HEART FAILURE: Primary | ICD-10-CM

## 2022-09-28 DIAGNOSIS — I50.42 CHRONIC COMBINED SYSTOLIC AND DIASTOLIC HEART FAILURE: ICD-10-CM

## 2022-09-28 LAB
ALBUMIN SERPL BCP-MCNC: 3.6 G/DL (ref 3.5–5.2)
ALP SERPL-CCNC: 88 U/L (ref 55–135)
ALT SERPL W/O P-5'-P-CCNC: 37 U/L (ref 10–44)
ANION GAP SERPL CALC-SCNC: 9 MMOL/L (ref 8–16)
AST SERPL-CCNC: 27 U/L (ref 10–40)
BILIRUB SERPL-MCNC: 1.1 MG/DL (ref 0.1–1)
BUN SERPL-MCNC: 36 MG/DL (ref 8–23)
CALCIUM SERPL-MCNC: 10.1 MG/DL (ref 8.7–10.5)
CHLORIDE SERPL-SCNC: 100 MMOL/L (ref 95–110)
CO2 SERPL-SCNC: 24 MMOL/L (ref 23–29)
CREAT SERPL-MCNC: 1.8 MG/DL (ref 0.5–1.4)
EST. GFR  (NO RACE VARIABLE): 39 ML/MIN/1.73 M^2
GLUCOSE SERPL-MCNC: 95 MG/DL (ref 70–110)
POTASSIUM SERPL-SCNC: 4.9 MMOL/L (ref 3.5–5.1)
PROT SERPL-MCNC: 7.5 G/DL (ref 6–8.4)
SODIUM SERPL-SCNC: 133 MMOL/L (ref 136–145)

## 2022-09-28 PROCEDURE — 36415 COLL VENOUS BLD VENIPUNCTURE: CPT | Performed by: NURSE PRACTITIONER

## 2022-09-28 PROCEDURE — 99214 OFFICE O/P EST MOD 30 MIN: CPT | Mod: PBBFAC | Performed by: INTERNAL MEDICINE

## 2022-09-28 PROCEDURE — 99999 PR PBB SHADOW E&M-EST. PATIENT-LVL IV: ICD-10-PCS | Mod: PBBFAC,,, | Performed by: INTERNAL MEDICINE

## 2022-09-28 PROCEDURE — 99214 OFFICE O/P EST MOD 30 MIN: CPT | Mod: S$PBB,,, | Performed by: INTERNAL MEDICINE

## 2022-09-28 PROCEDURE — 80053 COMPREHEN METABOLIC PANEL: CPT | Performed by: NURSE PRACTITIONER

## 2022-09-28 PROCEDURE — 99999 PR PBB SHADOW E&M-EST. PATIENT-LVL IV: CPT | Mod: PBBFAC,,, | Performed by: INTERNAL MEDICINE

## 2022-09-28 PROCEDURE — 83880 ASSAY OF NATRIURETIC PEPTIDE: CPT | Performed by: INTERNAL MEDICINE

## 2022-09-28 PROCEDURE — 99214 PR OFFICE/OUTPT VISIT, EST, LEVL IV, 30-39 MIN: ICD-10-PCS | Mod: S$PBB,,, | Performed by: INTERNAL MEDICINE

## 2022-09-28 RX ORDER — FUROSEMIDE 20 MG/1
20 TABLET ORAL 2 TIMES DAILY
Qty: 180 TABLET | Refills: 3 | Status: ON HOLD | OUTPATIENT
Start: 2022-09-28 | End: 2024-03-30

## 2022-09-28 NOTE — PATIENT INSTRUCTIONS
Start lasix at 20mg daily starting tomorrow.    STAY off of aldactone (spironolactone).     We would like to try to add another medication next week after labs next week:     Farxiga (dapagliflozin)  Jiardance (empagliflozin)

## 2022-09-29 LAB — NT-PROBNP SERPL IA-MCNC: 732 PG/ML

## 2022-10-03 ENCOUNTER — LAB VISIT (OUTPATIENT)
Dept: LAB | Facility: HOSPITAL | Age: 72
End: 2022-10-03
Attending: INTERNAL MEDICINE
Payer: MEDICARE

## 2022-10-03 DIAGNOSIS — I50.42 CHRONIC COMBINED SYSTOLIC AND DIASTOLIC CONGESTIVE HEART FAILURE: ICD-10-CM

## 2022-10-03 LAB
ANION GAP SERPL CALC-SCNC: 10 MMOL/L (ref 8–16)
BUN SERPL-MCNC: 27 MG/DL (ref 8–23)
CALCIUM SERPL-MCNC: 9.8 MG/DL (ref 8.7–10.5)
CHLORIDE SERPL-SCNC: 100 MMOL/L (ref 95–110)
CO2 SERPL-SCNC: 20 MMOL/L (ref 23–29)
CREAT SERPL-MCNC: 1.4 MG/DL (ref 0.5–1.4)
EST. GFR  (NO RACE VARIABLE): 52.7 ML/MIN/1.73 M^2
GLUCOSE SERPL-MCNC: 103 MG/DL (ref 70–110)
POTASSIUM SERPL-SCNC: 5 MMOL/L (ref 3.5–5.1)
SODIUM SERPL-SCNC: 130 MMOL/L (ref 136–145)

## 2022-10-03 PROCEDURE — 36415 COLL VENOUS BLD VENIPUNCTURE: CPT | Mod: PO | Performed by: INTERNAL MEDICINE

## 2022-10-03 PROCEDURE — 80048 BASIC METABOLIC PNL TOTAL CA: CPT | Performed by: INTERNAL MEDICINE

## 2022-10-06 DIAGNOSIS — I50.42 CHRONIC COMBINED SYSTOLIC AND DIASTOLIC CONGESTIVE HEART FAILURE: Primary | ICD-10-CM

## 2022-10-07 DIAGNOSIS — I47.29 NSVT (NONSUSTAINED VENTRICULAR TACHYCARDIA): Primary | ICD-10-CM

## 2022-10-07 NOTE — TELEPHONE ENCOUNTER
10/4  5:55 pm: Returned call to pt   pt wanted to report his bp are better:   ( top number)   stated he is less dizzy and currently feeling pretty good.   Wts past 4 days : 189, 187, 186, and 186 and 185  Pt asking if he should continue Lasix 20 mg once daily and told him he should   Pt offered he saw his local Cardilogist  today, Dr. Nikhil Velázquez   pt reported to him that he has been having a very bad metallic taste, that is interferring with his meals and eating well and thinks its from Amiodarone   pt reported dR Velázquez suggested cutting him Amiodarone in half, but to first check with Dr. Lam and pt is doing this now.   ( I noted this was started @ [facility] prior to his transfer to [facility] 9/16/22 for NSVT and frequent PVC's)  told pt I will discuss with Dr. Lam and let him know her response.   Pt confirmed he is taking Amiodarone 400 mg once daily     10/5/22  3:20 pm: TORB: Dr. Pennington/Alexander, pt may decrease amiodarone form 400 mg once daily to 200 mg once daily     10/6/22 3:30 pm:  Called pt and informed ok per Dr. Lam for him to decrease Amiodarone from 400 mg once daily to 200 mg once daily  Pt said he already did per Dr. Landry  Pt asking his next follow up that is scheduled on the [facility] be scheduled on the NS  I told pt he will also need lab work  Message sent to HF  asking this be arrange.     ----- Message from Kaye Patricia sent at 10/4/2022  1:33 PM CDT -----  Regarding: Check in  Amy,    Pt [phone] says you gave him the office number to call and check in with you in ref to his health. He would like a callback at the number listed.    Thanks

## 2022-10-10 RX ORDER — AMIODARONE HYDROCHLORIDE 400 MG/1
200 TABLET ORAL DAILY
Qty: 45 TABLET | Refills: 3 | Status: ON HOLD | OUTPATIENT
Start: 2022-10-10 | End: 2022-11-30 | Stop reason: SDUPTHER

## 2022-10-20 NOTE — PROGRESS NOTES
Subjective:     Patient ID:  Artemio Ogden is a 72 y.o. male who presents for follow-up of Congestive Heart Failure    HPI:  73 y/o M w/ PMH of NICM, HFrEF (EF 15% as of 7/6/22) s/p ICD and pacemaker, severe MR s/p MV repair (6/30/22), paroxysmal AFIB s/p MAZE and left atrial appendage resection (6/30/22), cardiac tamponade s/p pericardial window (7/17/22), bilateral carotid artery stenosis, hypothyroidism and lumbar spinal stenosis who initially presented to [facility] on 9/11 endorsing worsening SOB and continuous hacking cough with pink sputum. He was admitted with acute decompensated heart failure and started on Lasix drip of 5 and dobutamine 2.5. During his hospitalization there patient underwent several procedures including a CTA for an elevated d-dimer which was negative for PE but showed a small right sided pleural effusion. Patient also had a TTE (9/12/22) and RUCHI (9/13/22) done. TTE showed EF 15-20%, LV that was moderately to severely dilated, LA that was moderately to severely dilated, RV that was moderately dilated, RA that appeared dilated, MV that moderate to severe regurgitation, AV that showed trivial regurgitation, PV with mild to moderate regurgitation, TV w/ moderate regurgitation, Pericardium w/ no effusion, PASP was upper normal, and LVIDd of 7cm. RUCHI revealed severely depressed LV systolic function, EF of 15-20%, moderate MR after failed mitral valve repair, dilated right chambers. Patient was transferred to [facility] Sept 16, 2022 for higher level of care for his decompensated heart failure presenting with  2.5 and Lasix 5 drip.      Of note patient is s/p MVR/MAZE/ablation at [facility] on 6/5/22 and subsequently was admitted to [facility] on 7/17/22 w/ a large pericardial effusion w/ cardiac tamponade and sternal dehiscence. He underwent pericardial window, thoracostomy, and sternal debridement per Dr. Meeks. Left Heart Cath (5/20/22) showed mild  to moderate CAD.      At the time of examination patient denied any SOB, chest pain, dizziness, fever, chills, abdominal pain, nausea, vomiting. He states he has been compliant with his medications and diet. This is his first hospitalization for HF since his surgery in June of 2022.      Prior to this hosp stay home medications:   Atenolol 50mg daily (stopped by home cardiologist prior to transferring)  Levothyroxine 100 mcg daily  Liothyronine (T3) 5mcg daily   Aspirin 325mg daily  Lasix 40mg daily  Zyrtec 10 mg daily  Singulair 10mg daily   Amiodarone 400mg bid (started during hospitalization due to NSVT and frequent PVCs)     Of note as per patient, he has taken Coreg (SOB), metoprolol (muscle cramps, depression, visual disturbance), spironolactone (parasthesia, cramps, cough), Bystolic (edema, muscle cramps, wheezing), lisinopril (muscle cramps), and losartan (cough and muscle cramps) in the past and they were discontinued by his Cardiologist after patient reported apparent side effects as seen in parenthesis.  Patient was also on anticoagulation with Eliquis and then Xarelto but was taken off due to cramping and constipation since August 2022.     When I met him in the hospital started entresto and weaned off .  Entresto dose limited by BP and renal function as with his prior med intolerances I attempted to avoid any side-effects with slower up-titration.  He saw Dr. Lam September 28, 2022 and she initiated Lasix 20 mg once daily.  Subsequent to that visit Dr. Velázquez reduced amiodarone to 200 mg daily due to a metallic taste the patient was experiencing.    At today's visit, October 21, 2022 he reports feeling much better.  He has some edema that he notes that his sock line today but his that is unusual.  His home weight close the time of discharge 189.4 lb and today 190.6 lb at home.  His weight fluctuates between 188 and 190 lb based upon his home log which he brought with him.  His heart rate generally  "runs in the 80s and his blood pressure is in the 90s to 100s systolic.  He does report some dizzy sensations when he stands but he has had no falls.  He has significant nocturia.  He is doing his best to follow a low-sodium diet and seems to be doing a good job with that.  At this time shortness of breath is much improved, uses a cane to walk with, sleeps on 1 memory foam thick pillow without PND.  No symptomatic arrhythmia.     Review of Systems   Constitutional: Positive for malaise/fatigue. Negative for weight gain and weight loss.   Cardiovascular:  Positive for dyspnea on exertion and leg swelling. Negative for chest pain, orthopnea, paroxysmal nocturnal dyspnea and syncope.   Genitourinary:  Positive for nocturia.   Neurological:  Positive for dizziness and light-headedness.      Objective:   Physical Exam  Constitutional:       General: He is not in acute distress.     Appearance: He is well-developed. He is not ill-appearing, toxic-appearing or diaphoretic.      Comments: BP (!) 82/58 (BP Location: Right arm, Patient Position: Sitting, BP Method: Large (Automatic))   Pulse 76   Ht 6' 1" (1.854 m)   Wt 89.7 kg (197 lb 12 oz)   BMI 26.09 kg/m²   Friendly, elderly male in no acute distress   HENT:      Head: Normocephalic and atraumatic.   Eyes:      General: No scleral icterus.        Right eye: No discharge.         Left eye: No discharge.      Conjunctiva/sclera: Conjunctivae normal.   Neck:      Thyroid: No thyromegaly.      Vascular: No JVD.      Trachea: No tracheal deviation.   Cardiovascular:      Rate and Rhythm: Normal rate and regular rhythm.      Heart sounds: Normal heart sounds. No murmur (No definite murmur noted today) heard.    No gallop.      Comments: Reconstruction of chest noted  Pulmonary:      Effort: Pulmonary effort is normal.      Breath sounds: Normal breath sounds.   Abdominal:      General: Bowel sounds are normal. There is no distension (liver span 10 cm).      Palpations: " Abdomen is soft. There is no mass.      Tenderness: There is no abdominal tenderness. There is no guarding or rebound.   Musculoskeletal:         General: Swelling (1+ edema sock line and distal to it bilaterally) present. No tenderness.      Right lower leg: Edema (1+ edema sock line and distal to it bilaterally) present.      Left lower leg: Edema (1+ edema sock line and distal to it bilaterally) present.   Skin:     General: Skin is warm and dry.   Neurological:      General: No focal deficit present.      Mental Status: He is alert and oriented to person, place, and time. Mental status is at baseline.   Psychiatric:         Mood and Affect: Mood normal.         Behavior: Behavior normal.         Thought Content: Thought content normal.         Judgment: Judgment normal.      Lab Results   Component Value Date     10/19/2022    K 3.9 10/19/2022     10/19/2022    CO2 25 10/19/2022    BUN 15 10/19/2022    CREATININE 0.94 10/19/2022    CALCIUM 8.5 10/19/2022    ANIONGAP 8 10/19/2022    ESTGFRAFRICA 52.3 (A) 07/07/2022    EGFRNONAA 45.2 (A) 07/07/2022   .  Assessment:     1. S/P mitral valve repair    2. Chronic combined systolic and diastolic congestive heart failure      Plan:   Start jardiance 10 mg once a day    Take metoprolol half a tablet for 2 weeks and if all OK and pulse above 70 then in 2 weeks go to full tablet once a day    I am not going to increase Entresto at this time due to current blood pressure in clinic and his complaints of dizziness at home.  I suggested he take his Lasix later in the day when he is resting to see if he gets better diuretic response then and take the Jardiance in the early morning.  I have told him all once daily medications can be taken at the time of his choosing.    We discussed the mortality rate of heart failure and the goal of these medications.  He is not a candidate for advanced options.    I would like to see him in approximately 1 month and would plan to  continue to up titrate his medications assisting Dr. Velázquez.  I did stress with the patient that his cardiologist remains Dr. Velázquez and that he needs to cm regularly as we will be serving more in a consultative basis.  If he requires hospitalization or treatment for decompensated heart failure that would all be done locally.    His daughter is with him and all expressed understanding of his condition and treatment plan.

## 2022-10-21 ENCOUNTER — OFFICE VISIT (OUTPATIENT)
Dept: TRANSPLANT | Facility: CLINIC | Age: 72
End: 2022-10-21
Attending: INTERNAL MEDICINE
Payer: MEDICARE

## 2022-10-21 VITALS
BODY MASS INDEX: 26.21 KG/M2 | HEIGHT: 73 IN | WEIGHT: 197.75 LBS | SYSTOLIC BLOOD PRESSURE: 82 MMHG | HEART RATE: 76 BPM | DIASTOLIC BLOOD PRESSURE: 58 MMHG

## 2022-10-21 DIAGNOSIS — Z98.890 S/P MITRAL VALVE REPAIR: Primary | ICD-10-CM

## 2022-10-21 DIAGNOSIS — I50.42 CHRONIC COMBINED SYSTOLIC AND DIASTOLIC CONGESTIVE HEART FAILURE: ICD-10-CM

## 2022-10-21 PROCEDURE — 99999 PR PBB SHADOW E&M-EST. PATIENT-LVL III: CPT | Mod: PBBFAC,,, | Performed by: INTERNAL MEDICINE

## 2022-10-21 PROCEDURE — 99999 PR PBB SHADOW E&M-EST. PATIENT-LVL III: ICD-10-PCS | Mod: PBBFAC,,, | Performed by: INTERNAL MEDICINE

## 2022-10-21 PROCEDURE — 99213 OFFICE O/P EST LOW 20 MIN: CPT | Mod: PBBFAC,PO | Performed by: INTERNAL MEDICINE

## 2022-10-21 PROCEDURE — 99214 OFFICE O/P EST MOD 30 MIN: CPT | Mod: S$PBB,,, | Performed by: INTERNAL MEDICINE

## 2022-10-21 PROCEDURE — 99214 PR OFFICE/OUTPT VISIT, EST, LEVL IV, 30-39 MIN: ICD-10-PCS | Mod: S$PBB,,, | Performed by: INTERNAL MEDICINE

## 2022-10-21 RX ORDER — METOPROLOL SUCCINATE 50 MG/1
50 TABLET, EXTENDED RELEASE ORAL DAILY
Qty: 30 TABLET | Refills: 5 | Status: ON HOLD | OUTPATIENT
Start: 2022-10-21 | End: 2022-11-30 | Stop reason: SDUPTHER

## 2022-10-21 NOTE — PATIENT INSTRUCTIONS
Start jardiance 10 mg once a day    Take metoprolol half a tablet for 2 weeks and if all OK and pulse above 70 then in 2 weeks go to full tablet once a day

## 2022-10-27 ENCOUNTER — TELEPHONE (OUTPATIENT)
Dept: TRANSPLANT | Facility: CLINIC | Age: 72
End: 2022-10-27
Payer: MEDICARE

## 2022-10-27 NOTE — TELEPHONE ENCOUNTER
0730 am:  f/u on yesterdays nurse lab phone reminder  Labs resulted after hours, reviewed this am and are in epic  Per clinic visit note 10/21/22 Jardiance started -these lab results are stable. Cr:  0.88   glucose 111

## 2022-11-03 ENCOUNTER — TELEPHONE (OUTPATIENT)
Dept: TRANSPLANT | Facility: CLINIC | Age: 72
End: 2022-11-03

## 2022-11-03 NOTE — TELEPHONE ENCOUNTER
Received weekly lab results this afternoon from outside facility:  Na/K:  141/3.8   Cl/Co2:  106/23    Bun/Cr:  18/0.9   M.1   NtproBNP:  1800

## 2022-11-23 ENCOUNTER — TELEPHONE (OUTPATIENT)
Dept: TRANSPLANT | Facility: CLINIC | Age: 72
End: 2022-11-23
Payer: MEDICARE

## 2022-11-23 NOTE — TELEPHONE ENCOUNTER
1030 am:  BMP weekly results in epic   K  is 5.0 and notation states specimen is slightly hemolyzed   All other values in pts normal range   Ntproanp 1440 form 1650 and 1830     Mg 2.1      Pt gets weekly labs and K has been stable      Will route to Dr. Roman for his awarenss

## 2022-11-30 ENCOUNTER — TELEPHONE (OUTPATIENT)
Dept: TRANSPLANT | Facility: CLINIC | Age: 72
End: 2022-11-30
Payer: MEDICARE

## 2022-11-30 PROBLEM — K40.90 NON-RECURRENT UNILATERAL INGUINAL HERNIA WITHOUT OBSTRUCTION OR GANGRENE: Status: ACTIVE | Noted: 2022-11-30

## 2022-11-30 NOTE — TELEPHONE ENCOUNTER
1:10 pm:  F/U on todays weekly labs  Received lab results at this time from outside facility w / collection date   Na/K:  138/3.9   Cl/Co2: 106/27     Bun/Cr:  22/0.93  Glucose 86   M.1  NtproBNP

## 2022-12-20 PROBLEM — I50.42 CHRONIC COMBINED SYSTOLIC AND DIASTOLIC HEART FAILURE: Status: ACTIVE | Noted: 2022-09-16

## 2022-12-20 PROBLEM — Z99.11 ENCOUNTER FOR WEANING FROM VENTILATOR: Status: RESOLVED | Noted: 2022-06-30 | Resolved: 2022-12-20

## 2022-12-20 NOTE — PROGRESS NOTES
Subjective:   Initial evaluation of heart failure    HPI:  Mr. Ogden is a 72 y.o. year old White male who has presents to be considered for heart failure management. 73 y/o M w/ PMH of NICM, HFrEF (EF 15% as of 7/6/22) s/p ICD and pacemaker, severe MR s/p MV repair (6/30/22), paroxysmal AFIB s/p MAZE and left atrial appendage resection (6/30/22), cardiac tamponade s/p pericardial window (7/17/22), bilateral carotid artery stenosis, hypothyroidism and lumbar spinal stenosis who initially presented to [facility] on 9/11 endorsing worsening SOB and continuous hacking cough with pink sputum. He was admitted with acute decompensated heart failure and started on Lasix drip of 5 and dobutamine 2.5. During his hospitalization there patient underwent several procedures including a CTA for an elevated d-dimer which was negative for PE but showed a small right sided pleural effusion. Patient also had a TTE (9/12/22) and RUCHI (9/13/22) done. TTE showed EF 15-20%, LV that was moderately to severely dilated, LA that was moderately to severely dilated, RV that was moderately dilated, RA that appeared dilated, MV that moderate to severe regurgitation, AV that showed trivial regurgitation, PV with mild to moderate regurgitation, TV w/ moderate regurgitation, Pericardium w/ no effusion, PASP was upper normal, and LVIDd of 7cm. RUCHI revealed severely depressed LV systolic function, EF of 15-20%, moderate MR after failed mitral valve repair, dilated right chambers. Patient was transferred to [facility] for higher level of care for his decompensated heart failure presenting with  2.5 and Lasix 5 drip. Of note patient is s/p MVR/MAZE/ablation at [facility] on 6/5/22 and subsequently was admitted to [facility] on 7/17/22 w/ a large pericardial effusion w/ cardiac tamponade and sternal dehiscence. He underwent pericardial window, thoracostomy, and sternal debridement per Dr. Meeks. Left Heart Cath  (5/20/22) showed mild to moderate CAD.     Patient was admitted and diuresed, responded well, weaned off dobutamine and transitioned to oral GDMT (entresto). Started eliquis for afib. Spoke with him some about LVAD at discharge, however patient today is not as interested in this option. Of note, attime of annika had small abscess of superior aspect of sternotomy incision which was opened by CTS bedside, small amount of pus and old hematoma expressed. Got it packed.     Today he feels he is doing okay, tolerating medications, specifically entresto. Discussed some about advanced options of LVAD however he is hoping to not need this.     Past Medical History:   Diagnosis Date    A-fib     AICD (automatic cardioverter/defibrillator) present     Anticoagulant long-term use     CHF (congestive heart failure)     Chronic combined systolic and diastolic heart failure     Digestive disorder     reflux    H/O diverticulitis of colon     Hypertension     Mitral regurgitation 05/31/2022    Non-recurrent unilateral inguinal hernia without obstruction or gangrene 11/2022    S/P mitral valve repair     Sleep apnea     No CPAP    Thyroid disease      Past Surgical History:   Procedure Laterality Date    ABLATION N/A 06/30/2022    Procedure: MAZE;  Surgeon: Kurtis Machado MD;  [place] [facility] [place] [address] [place] [address];  Service: Cardiothoracic;  Laterality: N/A;    BLEPHAROPLASTY Bilateral 07/07/2017    CARDIAC SURGERY      AICD    COLECTOMY  06/2004    COLON SURGERY      EYE SURGERY Left 04/05/2012    Cataract     EYE SURGERY Left 02/14/2013    YAG Laser    EYE SURGERY Bilateral 01/14/2011    Lasik     HERNIA REPAIR Left 01/01/1967    INSERTION OF INTRA-AORTIC BALLOON ASSIST DEVICE Right 06/30/2022    Procedure: INSERTION, INTRA-AORTIC BALLOON PUMP;  Surgeon: Kurtis Machado MD;  [place] [facility] [place] [address] [place] [address];  Service: Cardiothoracic;  Laterality: Right;    LEFT HEART CATHETERIZATION Left 06/02/2022    Procedure: CATHETERIZATION, HEART,  LEFT;  Surgeon: Nikhil Lechuga III, MD;  Location: [facility] CATH;  Service: Cardiology;  Laterality: Left;    MITRAL VALVE REPAIR      PROSTATE SURGERY  01/01/1999    REPAIR, HERNIA, INGUINAL, WITHOUT HISTORY OF PRIOR REPAIR, AGE 5 YEARS OR OLDER Right 11/30/2022    Procedure: REPAIR, HERNIA, INGUINAL, WITHOUT HISTORY OF PRIOR REPAIR, AGE 5 YEARS OR OLDER;  Surgeon: Artemio Miller MD;  Location: [facility] OR;  Service: General;  Laterality: Right;    REWIRING OF STERNUM  07/17/2022    Repair sternum and ribs r/t previous surgery    THYROID LOBECTOMY  09/11/2006       Family History   Problem Relation Age of Onset    Pacemaker/defibrilator Mother     Heart disease Father        Review of Systems   Constitutional: Negative for chills, decreased appetite, diaphoresis, fever, malaise/fatigue, weight gain and weight loss.   Eyes:  Negative for visual disturbance.   Cardiovascular:  Negative for chest pain, claudication, cyanosis, dyspnea on exertion, irregular heartbeat, leg swelling, near-syncope, orthopnea, palpitations, paroxysmal nocturnal dyspnea and syncope.   Respiratory:  Negative for cough, hemoptysis, shortness of breath, sleep disturbances due to breathing, snoring, sputum production and wheezing.    Hematologic/Lymphatic: Negative for adenopathy and bleeding problem. Does not bruise/bleed easily.   Skin:  Negative for color change, poor wound healing, rash, skin cancer and suspicious lesions.   Musculoskeletal:  Negative for back pain, falls, gout, joint pain and muscle weakness.   Gastrointestinal:  Negative for bloating, abdominal pain, anorexia, constipation, diarrhea, heartburn, hematemesis, hematochezia, hemorrhoids, melena, nausea and vomiting.   Genitourinary:  Negative for nocturia and urgency.   Neurological:  Negative for excessive daytime sleepiness, dizziness, focal weakness, headaches, light-headedness, paresthesias, tremors and weakness.   Psychiatric/Behavioral:  Negative for depression and  "memory loss. The patient does not have insomnia and is not nervous/anxious.      Objective:   Blood pressure (!) 84/58, pulse 87, height 6' 1" (1.854 m), weight 88.5 kg (195 lb 1.7 oz).body mass index is 25.74 kg/m².    Physical Exam  Vitals and nursing note reviewed.   Constitutional:       General: He is not in acute distress.     Appearance: He is well-developed. He is not diaphoretic.   HENT:      Head: Normocephalic and atraumatic.   Eyes:      General:         Right eye: No discharge.         Left eye: No discharge.      Conjunctiva/sclera: Conjunctivae normal.   Neck:      Vascular: No JVD.   Cardiovascular:      Rate and Rhythm: Normal rate and regular rhythm.      Heart sounds: No murmur heard.    No friction rub. No gallop.   Pulmonary:      Effort: Pulmonary effort is normal.      Breath sounds: Normal breath sounds. No wheezing or rales.   Chest:      Chest wall: No tenderness.   Abdominal:      General: Bowel sounds are normal. There is no distension.      Palpations: Abdomen is soft. There is no mass.      Tenderness: There is no abdominal tenderness. There is no guarding or rebound.   Musculoskeletal:         General: No tenderness. Normal range of motion.      Cervical back: Normal range of motion and neck supple.   Skin:     General: Skin is warm and dry.      Findings: No erythema or rash.   Neurological:      Mental Status: He is alert and oriented to person, place, and time.   Psychiatric:         Behavior: Behavior normal.         Thought Content: Thought content normal.         Judgment: Judgment normal.       Labs:      Chemistry        Component Value Date/Time     12/14/2022 0900    K 4.0 12/14/2022 0900     12/14/2022 0900    CO2 26 12/14/2022 0900    BUN 23 (H) 12/14/2022 0900    CREATININE 1.06 12/14/2022 0900     (H) 12/14/2022 0900        Component Value Date/Time    CALCIUM 9.2 12/14/2022 0900    ALKPHOS 130 11/09/2022 1000    AST 44 11/09/2022 1000    ALT 40 " 11/09/2022 1000    BILITOT 0.6 11/09/2022 1000    ESTGFRAFRICA 52.3 (A) 07/07/2022 0524    EGFRNONAA 45.2 (A) 07/07/2022 0524          Magnesium   Date Value Ref Range Status   12/14/2022 2.2 1.6 - 2.6 mg/dL Final     Lab Results   Component Value Date    WBC 8.55 09/22/2022    HGB 11.6 (L) 11/30/2022    HCT 39.2 (L) 11/09/2022    MCV 81 (L) 09/22/2022     09/22/2022     No results found for: BNP  No results found for this or any previous visit.      Labs were reviewed with the patient.    Assessment:      1. Chronic combined systolic and diastolic congestive heart failure    2. Chronic combined systolic and diastolic heart failure    3. S/P mitral valve repair    4. Paroxysmal atrial fibrillation        Plan:   Start lasix 20mg po dialy.  Stay off aldactone.   Will have him look up [id number] and see if he is ok with starting one of these next week after repeat labs.   Patient is now NYHA III ACC stage D  Recommend 2 gram sodium restriction and 1500cc fluid restriction.  Encourage physical activity with graded exercise program.  Requested patient to weigh themselves daily, and to notify us if their weight increases by more than 3 lbs in 1 day or 5 lbs in 1 week.   RTC 1 month.   Keila Lam MD

## 2022-12-29 ENCOUNTER — TELEPHONE (OUTPATIENT)
Dept: TRANSPLANT | Facility: CLINIC | Age: 72
End: 2022-12-29

## 2022-12-29 NOTE — TELEPHONE ENCOUNTER
22  pm  Received outside lab results ( bmp/mg and ntprobnp) w/ collection date of 22   Pt was not on HF clinical phone reminder  Results as follows:  Na/K:  139/3.5   Cl/Co2:  111/22    Bun/Cr:  18/1.03   Glucose 140 and Ca++:  9.6  M.1  NtproBNP:  442    Will route this to Dr. Roman to see if necessary to continue these routine weekly labs

## 2023-01-13 ENCOUNTER — OFFICE VISIT (OUTPATIENT)
Dept: TRANSPLANT | Facility: CLINIC | Age: 73
End: 2023-01-13
Attending: INTERNAL MEDICINE
Payer: MEDICARE

## 2023-01-13 VITALS
HEART RATE: 98 BPM | WEIGHT: 201.5 LBS | HEIGHT: 73 IN | DIASTOLIC BLOOD PRESSURE: 64 MMHG | SYSTOLIC BLOOD PRESSURE: 108 MMHG | BODY MASS INDEX: 26.71 KG/M2

## 2023-01-13 DIAGNOSIS — Z98.890 S/P MITRAL VALVE REPAIR: ICD-10-CM

## 2023-01-13 DIAGNOSIS — I50.42 CHRONIC COMBINED SYSTOLIC AND DIASTOLIC CONGESTIVE HEART FAILURE: Primary | ICD-10-CM

## 2023-01-13 PROCEDURE — 99999 PR PBB SHADOW E&M-EST. PATIENT-LVL III: CPT | Mod: PBBFAC,,, | Performed by: INTERNAL MEDICINE

## 2023-01-13 PROCEDURE — 99214 PR OFFICE/OUTPT VISIT, EST, LEVL IV, 30-39 MIN: ICD-10-PCS | Mod: S$PBB,,, | Performed by: INTERNAL MEDICINE

## 2023-01-13 PROCEDURE — 99999 PR PBB SHADOW E&M-EST. PATIENT-LVL III: ICD-10-PCS | Mod: PBBFAC,,, | Performed by: INTERNAL MEDICINE

## 2023-01-13 PROCEDURE — 99214 OFFICE O/P EST MOD 30 MIN: CPT | Mod: S$PBB,,, | Performed by: INTERNAL MEDICINE

## 2023-01-13 PROCEDURE — 99213 OFFICE O/P EST LOW 20 MIN: CPT | Mod: PBBFAC,PO | Performed by: INTERNAL MEDICINE

## 2023-01-13 RX ORDER — METOPROLOL SUCCINATE 25 MG/1
50 TABLET, EXTENDED RELEASE ORAL DAILY
Qty: 90 TABLET | Refills: 3 | Status: ON HOLD | OUTPATIENT
Start: 2023-01-13 | End: 2024-03-30

## 2023-01-13 RX ORDER — ASPIRIN 81 MG/1
81 TABLET ORAL DAILY
Refills: 0
Start: 2023-01-13 | End: 2024-03-28 | Stop reason: CLARIF

## 2023-01-13 NOTE — PATIENT INSTRUCTIONS
"Stretching is the key for muscle cramps    Aspirin baby 81 mg instead of 325 mg    As long as you can handle dizzy sensations better to continue heart failure medications to improve survival as they are your "chemotherapy"    I will send you back to Dr. Velázquez, you do not have to see me    Since metoprolol succinate tolerating dose 25 mg daily, I will change you to the 25 mg tablet so you do not have to split the dose  "

## 2023-01-13 NOTE — PROGRESS NOTES
Subjective:     Patient ID:  Artemio Ogden is a 72 y.o. male who presents for follow-up of Congestive Heart Failure    HPI:  73 y/o M w/ PMH of NICM, HFrEF (EF 15% as of 7/6/22) s/p ICD and pacemaker, severe MR s/p MV repair (6/30/22), paroxysmal AFIB s/p MAZE and left atrial appendage resection (6/30/22), cardiac tamponade s/p pericardial window (7/17/22), bilateral carotid artery stenosis, hypothyroidism and lumbar spinal stenosis who initially presented to [facility] on 9/11 endorsing worsening SOB and continuous hacking cough with pink sputum. He was admitted with acute decompensated heart failure and started on Lasix drip of 5 and dobutamine 2.5. During his hospitalization there patient underwent several procedures including a CTA for an elevated d-dimer which was negative for PE but showed a small right sided pleural effusion. Patient also had a TTE (9/12/22) and RUCHI (9/13/22) done. TTE showed EF 15-20%, LV that was moderately to severely dilated, LA that was moderately to severely dilated, RV that was moderately dilated, RA that appeared dilated, MV that moderate to severe regurgitation, AV that showed trivial regurgitation, PV with mild to moderate regurgitation, TV w/ moderate regurgitation, Pericardium w/ no effusion, PASP was upper normal, and LVIDd of 7cm. RUCHI revealed severely depressed LV systolic function, EF of 15-20%, moderate MR after failed mitral valve repair, dilated right chambers. Patient was transferred to [facility] Sept 16, 2022 for higher level of care for his decompensated heart failure presenting with  2.5 and Lasix 5 drip.      Of note patient is s/p MVR/MAZE/ablation at [facility] on 6/5/22 and subsequently was admitted to [facility] on 7/17/22 w/ a large pericardial effusion w/ cardiac tamponade and sternal dehiscence. He underwent pericardial window, thoracostomy, and sternal debridement per Dr. Meeks. Left Heart Cath (5/20/22) showed mild  to moderate CAD.      Prior to this hosp stay home medications:   Atenolol 50mg daily (stopped by home cardiologist prior to transferring)  Levothyroxine 100 mcg daily  Liothyronine (T3) 5mcg daily   Aspirin 325mg daily  Lasix 40mg daily  Zyrtec 10 mg daily  Singulair 10mg daily   Amiodarone 400mg bid (started during hospitalization due to NSVT and frequent PVCs)     Of note as per patient, he has taken Coreg (SOB), metoprolol (muscle cramps, depression, visual disturbance), spironolactone (parasthesia, cramps, cough), Bystolic (edema, muscle cramps, wheezing), lisinopril (muscle cramps), and losartan (cough and muscle cramps) in the past and they were discontinued by his Cardiologist after patient reported apparent side effects as seen in parenthesis.  Patient was also on anticoagulation with Eliquis and then Xarelto but was taken off due to cramping and constipation since August 2022.     When I met him in the hospital September 2022 I started entresto and weaned off .  Entresto dose limited by BP and renal function as with his prior med intolerances I attempted to avoid any side-effects with slower up-titration.  He saw Dr. Lam September 28, 2022 and she initiated Lasix 20 mg once daily.  Subsequent to that visit Dr. Velázquez reduced amiodarone to 200 mg daily due to a metallic taste the patient was experiencing.    At visit October 21, 2022 he reported feeling much better.  At that visit I started jardiance 10 mg once a day and metoprolol succ 25 mg with plan to up-titrate if resting HR above 70.  I did not increase Entresto due to blood pressure in clinic and his complaints of dizziness at home.  I suggested he take his Lasix later in the day when he is resting to see if he gets better diuretic response then and take the Jardiance in the early morning.      At this visit 1/13/23 he reports that Dr. Velázquez stopped the amiodarone.  He has not gone up on metoprolol beyond 25 mg dose as resting HR was  "typically in the 70s and occasionally in the 60s.  He still has some dizzy lightheaded spells but no falls and it is not negatively impacting his quality of life.  He asked about getting on Cialis 5 mg daily for his prostate but I told him I did not think that was a good idea with blood pressure issues.  He was also impression that this would help with erection but at this dose that is not the case.  For erection issues I suggest he speak with his urologist regarding nonpharmacologic options.  He successfully underwent repair of his hernia.  He plans to get back to a walking program.    He continues to feel much better regarding his breathing.  Denies orthopnea or PND.  Still has some dizzy sensations when he stands.  Uses a cane to assist with balance.  No orthopnea or PND.  No symptomatic arrhythmia.  Dr. Velázquez stop the amiodarone at his last visit.  He was taking this medication for nonsustained VT and PVCs.    He is taking Eliquis and adult aspirin 325 mg daily.  He started taking a nonsteroidal anti-inflammatory drug and developed black stools.  Stop this and that resolved.  He noted an increase in his heart rate around that time as well.    Review of Systems   Constitutional: Positive for malaise/fatigue. Negative for weight gain and weight loss.   Cardiovascular:  Positive for dyspnea on exertion and leg swelling. Negative for chest pain, orthopnea, paroxysmal nocturnal dyspnea and syncope.   Gastrointestinal:  Positive for melena (Resolved see HPI).   Genitourinary:  Positive for frequency and nocturia.        ED   Neurological:  Positive for dizziness and light-headedness.      Objective:   Physical Exam  Constitutional:       General: He is not in acute distress.     Appearance: He is well-developed. He is not ill-appearing, toxic-appearing or diaphoretic.      Comments: BP (!) 82/58 (BP Location: Right arm, Patient Position: Sitting, BP Method: Large (Automatic))   Pulse 76   Ht 6' 1" (1.854 m)   Wt " 89.7 kg (197 lb 12 oz)   BMI 26.09 kg/m²   Friendly, elderly male in no acute distress   HENT:      Head: Normocephalic and atraumatic.   Eyes:      General: No scleral icterus.        Right eye: No discharge.         Left eye: No discharge.      Conjunctiva/sclera: Conjunctivae normal.   Neck:      Thyroid: No thyromegaly.      Vascular: No JVD.      Trachea: No tracheal deviation.      Comments: JVP below 8 cm  Cardiovascular:      Rate and Rhythm: Normal rate and regular rhythm.      Heart sounds: Normal heart sounds. No murmur (No definite murmur noted today) heard.    No gallop.      Comments: Reconstruction of chest noted  Pulmonary:      Effort: Pulmonary effort is normal.      Breath sounds: Normal breath sounds.   Abdominal:      General: Bowel sounds are normal. There is no distension (liver span 10 cm).      Palpations: Abdomen is soft. There is no mass.      Tenderness: There is no abdominal tenderness. There is no guarding or rebound.   Musculoskeletal:         General: Swelling (Trace sock line) present. No tenderness.      Right lower leg: Edema present.      Left lower leg: Edema present.   Skin:     General: Skin is warm and dry.   Neurological:      General: No focal deficit present.      Mental Status: He is alert and oriented to person, place, and time. Mental status is at baseline.   Psychiatric:         Mood and Affect: Mood normal.         Behavior: Behavior normal.         Thought Content: Thought content normal.         Judgment: Judgment normal.      Lab Results   Component Value Date     01/11/2023    K 4.1 01/11/2023     01/11/2023    CO2 22 01/11/2023    BUN 16 01/11/2023    CREATININE 1.25 01/11/2023    CALCIUM 8.5 01/11/2023    ANIONGAP 8 01/11/2023    ESTGFRAFRICA 52.3 (A) 07/07/2022    EGFRNONAA 45.2 (A) 07/07/2022     Component Ref Range & Units 2 d ago   (1/11/23) 9 d ago   (1/4/23) 2 wk ago   (12/28/22) 3 wk ago   (12/21/22) 1 mo ago   (12/14/22) 1 mo ago   (12/6/22)  1 mo ago   (11/29/22)   NT-proBNP 5 - 900 pg/mL 584  429 CM  442 CM  973 High  CM  1370 High  CM  2350 High  CM  1870 High  CM      Assessment:     1. Chronic combined systolic and diastolic congestive heart failure    2. S/P mitral valve repair      Plan:   I have told him all once daily medications can be taken at the time of his choosing.      We discussed the mortality rate of heart failure and the goal of these medications to improve quality of life and survival.  He is not a candidate for advanced options so sees medications are in effect his chemotherapy.  At this point seems to be on the maximal tolerable doses of Entresto and metoprolol.  Will change metoprolol tablets strength to 25 mg so that he does not have to split the 50 mg tablets.  At this point he will follow-up with Dr. Velázquez and no longer needs to follow-up with myself or HTS.    His daughter is with him and all expressed understanding of his condition and care plan.    Note that he is not on spironolactone or eplerenone.  I will defer initiation of this therapy to Dr. Velázquez in his follow-up.

## 2023-01-23 ENCOUNTER — TELEPHONE (OUTPATIENT)
Dept: TRANSPLANT | Facility: CLINIC | Age: 73
End: 2023-01-23
Payer: MEDICARE

## 2023-01-23 NOTE — TELEPHONE ENCOUNTER
2:00 pm  Had been awaiting lab results form 1/18/23  collecions which I was told on 1/18, 1/19, and 1/20 were awaiting results  Today  Yandy CLARK informed me HH said they did not collect these labs , pt said Dr. Roman said he no longer needed labs and they were discharging pt    I noted this in Dr. Pickett 1/13/23 clinic note and informed  MA of this and added it to pts snapshot:  At this point he will follow-up with Dr. Velázquez and no longer needs to follow-up with myself or HTS.

## 2023-09-13 ENCOUNTER — PATIENT MESSAGE (OUTPATIENT)
Dept: TRANSPLANT | Facility: CLINIC | Age: 73
End: 2023-09-13
Payer: MEDICARE

## 2024-03-28 PROBLEM — N17.9 AKI (ACUTE KIDNEY INJURY): Status: ACTIVE | Noted: 2024-03-28

## 2024-03-28 PROBLEM — Z71.89 ACP (ADVANCE CARE PLANNING): Status: ACTIVE | Noted: 2024-03-28

## 2024-03-29 PROBLEM — N18.31 STAGE 3A CHRONIC KIDNEY DISEASE: Status: ACTIVE | Noted: 2024-03-28

## 2024-03-29 PROBLEM — I5A NONISCHEMIC NONTRAUMATIC MYOCARDIAL INJURY: Status: ACTIVE | Noted: 2024-03-29

## 2024-04-12 ENCOUNTER — TELEPHONE (OUTPATIENT)
Dept: ELECTROPHYSIOLOGY | Facility: CLINIC | Age: 74
End: 2024-04-12
Payer: MEDICARE

## 2024-04-15 ENCOUNTER — TELEPHONE (OUTPATIENT)
Dept: ELECTROPHYSIOLOGY | Facility: CLINIC | Age: 74
End: 2024-04-15
Payer: MEDICARE

## 2024-04-15 DIAGNOSIS — I48.0 PAROXYSMAL ATRIAL FIBRILLATION: Primary | ICD-10-CM

## 2024-04-15 NOTE — TELEPHONE ENCOUNTER
Does patient have a pacemaker/ICD/Loop: Yes  Has patient been treated for this condition before by an outside physician:Yes  If so, what doctor. Dr. Lechuga  Has patient had any recent testing done on the heart like holter, stress test, echo: Yes  Has patient had any prior surgeries for an arrhythmiaYes

## 2024-04-23 ENCOUNTER — TELEPHONE (OUTPATIENT)
Dept: ELECTROPHYSIOLOGY | Facility: CLINIC | Age: 74
End: 2024-04-23
Payer: MEDICARE

## 2024-04-23 PROBLEM — Z95.810 BIVENTRICULAR IMPLANTABLE CARDIOVERTER-DEFIBRILLATOR (ICD) IN SITU: Status: ACTIVE | Noted: 2024-04-23

## 2024-04-23 PROBLEM — Z98.890 S/P LEFT ATRIAL APPENDAGE LIGATION: Status: ACTIVE | Noted: 2024-04-23

## 2024-04-23 PROBLEM — I25.118 ATHSCL HEART DISEASE OF NATIVE COR ART W OTH ANG PCTRS: Status: ACTIVE | Noted: 2024-04-23

## 2024-04-23 NOTE — PROGRESS NOTES
PCP - Danna Lanza MD  Subjective:     I had the pleasure today of seeing Artemio Ogden (73 y.o. male) at the request of Dr. Lechuga for atrial fibrillation.    History:  pAF s/p DCCV 2022  S/p MAZE with resection of MAGGIE (Luis M quiñonez)  CRT-D in situ (2018)  Cardiac tamponade s/p pericardial window 2022  NICM/HFrEF (LVEF 15-20%)  MR s/p repair 6/2022 (Dr. Machado)  Carotid artery stenosis  MARCOS  CKD3    Background:  Seen previously by HTS. Not a candidate for advanced options.   Seen in the ED 3/30/24 with AF RVR and PVCs > converted with sedation during planned CV > discharged on Toprol XL 50 mg QD  4 VT events since March '24 which were all treated successfully with ATP:   - 4/11/24:  ms > type 2 break with ATP   - 4/4/24:  ms > successful ATP    - 4/1/24:  ms > successful ATP   - 3/30/24:  ms > successful ATP (no EGMs)  In response device LRL changed from 60 to 75 bpm. 90% AP and 75% BP. High PVC burden. AF/AT burden <1%    [facility] 2022: nonobstructive CAD  RUCHI 5/2022: EF 35%  TTE 9/2022: EF 15-20%, severe RV dysfunction    In clinic today:  Reports chronic fatigue and VILLANUEVA  Intolerant to amiodarone secondary to bad taste  Stopped mexiletine Jan '24 secondary to concerns for tremors being related to medication. Since discontinuation his tremors have not improved.     9 total treated events over lifetime of device  Device: RA 93%, 79% BiV, VSR 7.8%. 4/19  ms. Successful ATP. 0% AF burden. Max lifetime 2022 4 hrs 43 min. Feb 9 min and April 3 min.     Previously on mexiletine which was d/c'd secondary to tremors   Labs: cr 1.44, TSH normal    I personally reviewed the ECG/telemetry strip today which shows AP- with polymorphic PVCs    History:     Social History     Tobacco Use    Smoking status: Former    Smokeless tobacco: Never    Tobacco comments:     No drink or smoking in over 30 years per patient as of 11/11/2022   Substance Use Topics    Alcohol use: No     Family  "History   Problem Relation Name Age of Onset    Pacemaker/defibrilator Mother      Heart disease Father         Meds:   Review of patient's allergies indicates:  No Known Allergies    Current Outpatient Medications:     apixaban (ELIQUIS) 5 mg Tab, Take 1 tablet (5 mg total) by mouth 2 (two) times daily., Disp: , Rfl:     ascorbic acid (VITAMIN C ORAL), Take 1,600 mg by mouth 2 (two) times a day., Disp: , Rfl:     aspirin (ECOTRIN) 325 MG EC tablet, Take 325 mg by mouth once daily., Disp: , Rfl:     cholecalciferol, vitamin D3, (VITAMIN D3) 125 mcg (5,000 unit) Tab, Take 5,000 Units by mouth 2 (two) times a day., Disp: , Rfl:     furosemide (LASIX) 20 MG tablet, Take 2 tablets (40 mg total) by mouth once daily., Disp: , Rfl:     HYDROcodone-acetaminophen (NORCO)  mg per tablet, Take 0.5 tablets by mouth once daily., Disp: , Rfl:     INSULIN SYRINGE MICROFINE 1 mL 27 gauge x 5/8" Syrg, Inject 1 Syringe into the muscle every 10 days., Disp: , Rfl:     JARDIANCE 25 mg tablet, Take 25 mg by mouth once daily., Disp: , Rfl:     LEVOXYL 88 mcg tablet, Take 88 mcg by mouth before breakfast., Disp: , Rfl:     metOLazone (ZAROXOLYN) 5 MG tablet, Take 1 tablet by mouth daily as needed (for fluid)., Disp: , Rfl:     metoprolol succinate (TOPROL-XL) 50 MG 24 hr tablet, Take 1 tablet (50 mg total) by mouth once daily., Disp: 30 tablet, Rfl: 1    potassium chloride (KLOR-CON) 10 MEQ TbSR, Take 10 mEq by mouth once daily., Disp: , Rfl:     quercetin 500 mg Cap, Take 500 mg by mouth once daily., Disp: , Rfl:     sacubitriL-valsartan (ENTRESTO) 24-26 mg per tablet, Take 1 tablet by mouth 2 (two) times daily., Disp: 180 tablet, Rfl: 3    tadalafiL (CIALIS) 5 MG tablet, Take 5 mg by mouth daily as needed for Erectile Dysfunction., Disp: , Rfl:     testosterone cypionate (DEPOTESTOTERONE CYPIONATE) 200 mg/mL injection, Inject 0.45 mLs into the muscle every 10 days., Disp: , Rfl:     zinc gluconate 50 mg tablet, Take 50 mg by mouth " once daily., Disp: , Rfl:   No current facility-administered medications for this visit.    Facility-Administered Medications Ordered in Other Visits:     albuterol-ipratropium 2.5 mg-0.5 mg/3 mL nebulizer solution 3 mL, 3 mL, Nebulization, Q15 Min PRN, Patrick Davila MD    diphenhydrAMINE injection 25 mg, 25 mg, Intravenous, Q6H PRN, Patrick Davila MD    ipratropium 0.02 % nebulizer solution 0.5 mg, 0.5 mg, Nebulization, Q4H PRN, Patrick Davila MD    ondansetron injection 4 mg, 4 mg, Intravenous, Q15 Min PRN, Patrick Davila MD    prochlorperazine injection Soln 5 mg, 5 mg, Intravenous, Q30 Min PRN, Patrick Davila MD    Constitution: Negative for fever or chills. Negative for weight loss or gain.   HENT: Negative for sore throat or headaches. Negative for rhinorrhea.  Eyes: Negative for blurred or double vision.   Cardiovascular: See above  Pulmonary: Negative for SOB. Negative for cough.   Gastrointestinal: Negative for abdominal pain. Negative for nausea/ vomiting. Negative for diarrhea.   : Negative for dysuria.   Neurological: Negative for focal weakness or sensory changes.    Objective:     Vitals:    04/24/24 0956   BP: (!) 100/53   Pulse: 87     General: NAD. AAO.  HENT: No scleral icterus. Extraocular movements intact.  Neck: No JVD  Cardiovascular: Regular heart rate and rhythm. S1/S2 appreciated.  Respiratory: CTAB. No increased work of breathing.  Extremities: Warm. No edema.  Skin: no ulceration or wounds present    Labs & Imaging   Reviewed in clinic today    Assessment:   Artemio Ogden is a 73 y.o. y.o. male who presented today for evaluation of atrial fibrillation and VT. Chronic fatigue likely related to advanced NICM CHF. LVEF 15-20% with significant RV dysfunction.     Regarding AF, device interrogation shows low burden of AF. MAGGIE ligation 2022. Would recommend RUCHI and if MAGGIE ligated could discontinue OAC.     Regarding PVC/VT, he has had several MMVT episodes  lately which have all been treated appropriately with ATP. He is unable to tolerate amiodarone. Beta blockers uptitrated to max tolerated. Would recommend restarting mexiletine. Could consider sotalol, but given beta blocker tolerance issues would likely not tolerate well. PVCs could not be targeted for ablation as the foci are from multiple locations within the LV.    1. Paroxysmal atrial fibrillation        2. Athscl heart disease of native cor art w oth ang pctrs        3. Biventricular implantable cardioverter-defibrillator (ICD) in situ        4. Bilateral carotid artery stenosis        5. Mitral valve insufficiency, unspecified etiology        6. S/P mitral valve repair        7. Acute on chronic combined systolic and diastolic heart failure        8. Congestive heart failure, unspecified HF chronicity, unspecified heart failure type        9. Stage 3a chronic kidney disease        10. S/P left atrial appendage ligation          Plan:     - Restart Mexiletine 150 mg TID  - Uptitrate beta blocker as tolerated  - Dr. Velázquez to consider RUCHI with discontinuation of OAC    F/u PRN    Rock Christensen MD, PGY8  Electrophysiology

## 2024-04-24 ENCOUNTER — OFFICE VISIT (OUTPATIENT)
Dept: ELECTROPHYSIOLOGY | Facility: CLINIC | Age: 74
End: 2024-04-24
Payer: MEDICARE

## 2024-04-24 ENCOUNTER — CLINICAL SUPPORT (OUTPATIENT)
Dept: CARDIOLOGY | Facility: HOSPITAL | Age: 74
End: 2024-04-24
Attending: INTERNAL MEDICINE
Payer: MEDICARE

## 2024-04-24 VITALS
BODY MASS INDEX: 30.21 KG/M2 | DIASTOLIC BLOOD PRESSURE: 53 MMHG | HEIGHT: 73 IN | HEART RATE: 87 BPM | WEIGHT: 227.94 LBS | SYSTOLIC BLOOD PRESSURE: 100 MMHG

## 2024-04-24 DIAGNOSIS — I48.0 PAROXYSMAL ATRIAL FIBRILLATION: Primary | ICD-10-CM

## 2024-04-24 DIAGNOSIS — I25.118 ATHSCL HEART DISEASE OF NATIVE COR ART W OTH ANG PCTRS: ICD-10-CM

## 2024-04-24 DIAGNOSIS — I65.23 BILATERAL CAROTID ARTERY STENOSIS: ICD-10-CM

## 2024-04-24 DIAGNOSIS — Z98.890 S/P LEFT ATRIAL APPENDAGE LIGATION: ICD-10-CM

## 2024-04-24 DIAGNOSIS — I48.0 PAROXYSMAL ATRIAL FIBRILLATION: ICD-10-CM

## 2024-04-24 DIAGNOSIS — N18.31 STAGE 3A CHRONIC KIDNEY DISEASE: ICD-10-CM

## 2024-04-24 DIAGNOSIS — I50.43 ACUTE ON CHRONIC COMBINED SYSTOLIC AND DIASTOLIC HEART FAILURE: ICD-10-CM

## 2024-04-24 DIAGNOSIS — Z98.890 S/P MITRAL VALVE REPAIR: ICD-10-CM

## 2024-04-24 DIAGNOSIS — I34.0 MITRAL VALVE INSUFFICIENCY, UNSPECIFIED ETIOLOGY: ICD-10-CM

## 2024-04-24 DIAGNOSIS — I50.9 CONGESTIVE HEART FAILURE, UNSPECIFIED HF CHRONICITY, UNSPECIFIED HEART FAILURE TYPE: ICD-10-CM

## 2024-04-24 DIAGNOSIS — Z95.810 BIVENTRICULAR IMPLANTABLE CARDIOVERTER-DEFIBRILLATOR (ICD) IN SITU: ICD-10-CM

## 2024-04-24 LAB
OHS QRS DURATION: 184 MS
OHS QTC CALCULATION: 539 MS

## 2024-04-24 PROCEDURE — 93005 ELECTROCARDIOGRAM TRACING: CPT | Mod: PBBFAC | Performed by: INTERNAL MEDICINE

## 2024-04-24 PROCEDURE — 93010 ELECTROCARDIOGRAM REPORT: CPT | Mod: S$PBB,,, | Performed by: INTERNAL MEDICINE

## 2024-04-24 PROCEDURE — 93284 PRGRMG EVAL IMPLANTABLE DFB: CPT

## 2024-04-24 PROCEDURE — 99205 OFFICE O/P NEW HI 60 MIN: CPT | Mod: S$PBB,,, | Performed by: INTERNAL MEDICINE

## 2024-04-24 PROCEDURE — 99999 PR PBB SHADOW E&M-EST. PATIENT-LVL III: CPT | Mod: PBBFAC,,, | Performed by: INTERNAL MEDICINE

## 2024-04-24 PROCEDURE — 99213 OFFICE O/P EST LOW 20 MIN: CPT | Mod: PBBFAC,25 | Performed by: INTERNAL MEDICINE

## 2024-05-27 ENCOUNTER — TELEPHONE (OUTPATIENT)
Dept: ELECTROPHYSIOLOGY | Facility: CLINIC | Age: 74
End: 2024-05-27
Payer: MEDICARE

## 2024-05-27 NOTE — TELEPHONE ENCOUNTER
Recieved message to call patient for another referral visit. Discussed with patient that we just saw him last month and his discharge notes state to discuss another RUCHI with Dr. Lechuga to discontinue OAC meds. Advised patient to look at his after visit summary in the portal and discuss with his doctor and call us if needed but we should not have to to do another consult on his Afib is he was just in last month. Patient verbalized understanding.

## (undated) DEVICE — SYR 30CC LUER LOCK

## (undated) DEVICE — INSERTS STEALTH FIBRA SIZE 5

## (undated) DEVICE — LOOP VESSEL BLUE MAXI

## (undated) DEVICE — SUT PROLENE 4-0 RB-1 BL MO

## (undated) DEVICE — DRAIN CHANNEL ROUND 19FR

## (undated) DEVICE — SUT VICRYL PLUS 3-0 FS1 27

## (undated) DEVICE — BOWL STERILE LARGE 32OZ

## (undated) DEVICE — SUT VICRYL BR 1 GEN 27 CT-1

## (undated) DEVICE — KIT URINARY CATH URINE METER

## (undated) DEVICE — CANNULA MULTIPLE PERFUSIONSET

## (undated) DEVICE — FOGERTY SOFT JAW DISP 2/PK

## (undated) DEVICE — BLADE SAW STERNAL 5/BX

## (undated) DEVICE — SUT PROLENE 3-0 SH DA 36 BL

## (undated) DEVICE — DRAPE SPLIT STERILE

## (undated) DEVICE — SUT 6 18IN STEEL MONO CCS

## (undated) DEVICE — KIT SAHARA DRAPE DRAW/LIFT

## (undated) DEVICE — SUT 2-0 VICRYL / CT-1

## (undated) DEVICE — DRAPE INCISE IOBAN 2 23X17IN

## (undated) DEVICE — CLAMP LONG JAW ISOLATOR OPEN

## (undated) DEVICE — CLOSURE SKIN STERI STRIP 1/2X4

## (undated) DEVICE — NDL 22GA X1 1/2 REG BEVEL

## (undated) DEVICE — DRESSING AQUACEL SACRAL 9 X 9

## (undated) DEVICE — HEMOSTAT SURGICEL NU-KNIT 6X9

## (undated) DEVICE — TRAY HEART

## (undated) DEVICE — DRESSING ADH ISLAND 3.6 X 14

## (undated) DEVICE — Device

## (undated) DEVICE — ELECTRODE REM PLYHSV RETURN 9

## (undated) DEVICE — PROBE CRYO ABLATION 10CM

## (undated) DEVICE — DRAIN CHEST DRY SUCTION

## (undated) DEVICE — TAPE SURG MEDIPORE 6X72IN

## (undated) DEVICE — CONTAINER SPECIMEN STRL 4OZ

## (undated) DEVICE — SUT PROLENE 5-0 24 C-1 BL

## (undated) DEVICE — SOL 9P NACL IRR PIC IL

## (undated) DEVICE — SUT ETHIBOND XTRA 2-0 SH-2

## (undated) DEVICE — TIP YANKAUERS BULB NO VENT

## (undated) DEVICE — SUT 2/0 30IN ETHIBOND

## (undated) DEVICE — CANNULA RETROGRADE CARDIOPLEG

## (undated) DEVICE — GAUZE SPONGE 4X4 12PLY

## (undated) DEVICE — SUT SILK 2-0 SH 18IN BLACK

## (undated) DEVICE — STAPLER ECHELON FLEX 45MM 34CM

## (undated) DEVICE — SUT SILK BLK BR. 2 2-60

## (undated) DEVICE — GLOVE BIOGEL PI MICRO SZ 7.5

## (undated) DEVICE — DRAPE SLUSH WARMER WITH DISC

## (undated) DEVICE — SUT PROLENE 4-0 SH BLU 36IN